# Patient Record
Sex: FEMALE | Race: WHITE | NOT HISPANIC OR LATINO | Employment: OTHER | URBAN - NONMETROPOLITAN AREA
[De-identification: names, ages, dates, MRNs, and addresses within clinical notes are randomized per-mention and may not be internally consistent; named-entity substitution may affect disease eponyms.]

---

## 2017-02-01 ENCOUNTER — COMMUNICATION - GICH (OUTPATIENT)
Dept: FAMILY MEDICINE | Facility: OTHER | Age: 82
End: 2017-02-01

## 2017-02-01 DIAGNOSIS — F41.8 OTHER SPECIFIED ANXIETY DISORDERS: ICD-10-CM

## 2017-04-20 ENCOUNTER — COMMUNICATION - GICH (OUTPATIENT)
Dept: FAMILY MEDICINE | Facility: OTHER | Age: 82
End: 2017-04-20

## 2017-04-20 DIAGNOSIS — F41.8 OTHER SPECIFIED ANXIETY DISORDERS: ICD-10-CM

## 2017-06-28 ENCOUNTER — COMMUNICATION - GICH (OUTPATIENT)
Dept: FAMILY MEDICINE | Facility: OTHER | Age: 82
End: 2017-06-28

## 2017-06-28 DIAGNOSIS — N39.46 MIXED INCONTINENCE: ICD-10-CM

## 2017-07-12 ENCOUNTER — COMMUNICATION - GICH (OUTPATIENT)
Dept: FAMILY MEDICINE | Facility: OTHER | Age: 82
End: 2017-07-12

## 2017-07-12 DIAGNOSIS — F41.8 OTHER SPECIFIED ANXIETY DISORDERS: ICD-10-CM

## 2017-07-12 DIAGNOSIS — I10 ESSENTIAL (PRIMARY) HYPERTENSION: ICD-10-CM

## 2017-07-14 ENCOUNTER — COMMUNICATION - GICH (OUTPATIENT)
Dept: FAMILY MEDICINE | Facility: OTHER | Age: 82
End: 2017-07-14

## 2017-07-14 DIAGNOSIS — F41.8 OTHER SPECIFIED ANXIETY DISORDERS: ICD-10-CM

## 2017-08-17 ENCOUNTER — COMMUNICATION - GICH (OUTPATIENT)
Dept: FAMILY MEDICINE | Facility: OTHER | Age: 82
End: 2017-08-17

## 2017-09-21 ENCOUNTER — COMMUNICATION - GICH (OUTPATIENT)
Dept: FAMILY MEDICINE | Facility: OTHER | Age: 82
End: 2017-09-21

## 2017-09-21 DIAGNOSIS — N39.46 MIXED INCONTINENCE: ICD-10-CM

## 2017-09-22 ENCOUNTER — COMMUNICATION - GICH (OUTPATIENT)
Dept: FAMILY MEDICINE | Facility: OTHER | Age: 82
End: 2017-09-22

## 2017-09-22 DIAGNOSIS — N39.46 MIXED INCONTINENCE: ICD-10-CM

## 2017-10-14 ENCOUNTER — COMMUNICATION - GICH (OUTPATIENT)
Dept: FAMILY MEDICINE | Facility: OTHER | Age: 82
End: 2017-10-14

## 2017-10-14 DIAGNOSIS — I10 ESSENTIAL (PRIMARY) HYPERTENSION: ICD-10-CM

## 2017-11-03 ENCOUNTER — HISTORY (OUTPATIENT)
Dept: FAMILY MEDICINE | Facility: OTHER | Age: 82
End: 2017-11-03

## 2017-11-03 ENCOUNTER — OFFICE VISIT - GICH (OUTPATIENT)
Dept: FAMILY MEDICINE | Facility: OTHER | Age: 82
End: 2017-11-03

## 2017-11-03 DIAGNOSIS — F41.8 OTHER SPECIFIED ANXIETY DISORDERS: ICD-10-CM

## 2017-11-03 DIAGNOSIS — K21.9 GASTRO-ESOPHAGEAL REFLUX DISEASE WITHOUT ESOPHAGITIS: ICD-10-CM

## 2017-11-03 DIAGNOSIS — J30.1 ALLERGIC RHINITIS DUE TO POLLEN: ICD-10-CM

## 2017-11-03 DIAGNOSIS — Z91.81 HISTORY OF FALLING: ICD-10-CM

## 2017-11-03 DIAGNOSIS — N39.46 MIXED INCONTINENCE: ICD-10-CM

## 2017-11-03 DIAGNOSIS — I10 ESSENTIAL (PRIMARY) HYPERTENSION: ICD-10-CM

## 2017-11-03 DIAGNOSIS — Z23 ENCOUNTER FOR IMMUNIZATION: ICD-10-CM

## 2017-11-03 LAB
ANION GAP - HISTORICAL: 6 (ref 5–18)
BUN SERPL-MCNC: 19 MG/DL (ref 7–25)
BUN/CREAT RATIO - HISTORICAL: 20
CALCIUM SERPL-MCNC: 9.2 MG/DL (ref 8.6–10.3)
CHLORIDE SERPLBLD-SCNC: 106 MMOL/L (ref 98–107)
CO2 SERPL-SCNC: 24 MMOL/L (ref 21–31)
CREAT SERPL-MCNC: 0.94 MG/DL (ref 0.7–1.3)
GFR IF NOT AFRICAN AMERICAN - HISTORICAL: 56 ML/MIN/1.73M2
GLUCOSE SERPL-MCNC: 101 MG/DL (ref 70–105)
POTASSIUM SERPL-SCNC: 3.8 MMOL/L (ref 3.5–5.1)
SODIUM SERPL-SCNC: 136 MMOL/L (ref 133–143)

## 2017-11-29 ENCOUNTER — COMMUNICATION - GICH (OUTPATIENT)
Dept: FAMILY MEDICINE | Facility: OTHER | Age: 82
End: 2017-11-29

## 2017-12-28 NOTE — TELEPHONE ENCOUNTER
Patient Information     Patient Name MRN Virginia Hidalgo [id number] Female 10/24/1929      Telephone Encounter by Sena Thompson RN at 10/17/2017  8:26 AM     Author:  Sena Thompson RN Service:  (none) Author Type:  NURS- Registered Nurse     Filed:  10/17/2017  8:31 AM Encounter Date:  10/14/2017 Status:  Signed     :  Sena Thompson RN (NURS- Registered Nurse)            Beta Blockers     Office visit in the past 12 months or per provider note.    Last visit with ERYN ACOSTA was on: 2016 in [facility]  Next visit with ERYN ACOSTA is on: 2017 in [facility]  Next visit with Family Practice is on: 2017 in [facility]    Max refill for 12 months from last office visit or per provider note.    Angiotensin Receptor Blockers (ARB)    Office visit in the past 12 months or per provider note.    Last visit with ERYN ACOSTA was on: 2016 in [facility]  Next visit with ERYN ACOSTA is on: 2017 in [facility]  Next visit with Family Practice is on: 2017 in [facility]    Lab test requirements:  Creatinine and Potassium annually, if ordering lab, order BMP.  CREATININE (mg/dL)    Date Value   2016 0.95     POTASSIUM (mmol/L)    Date Value   2016 4.8       Max refill for 12 months from last office visit or per provider note    Patient is due for medication management appointment. Limited refill provided at this time. Noted upcoming appointment. Will fill for #90, as that is what patient has requested in the past and she does have px appointment scheduled.  Prescription refilled per RN Medication Refill Policy.................... Sena Thompson RN ....................  10/17/2017   8:28 AM

## 2017-12-28 NOTE — PATIENT INSTRUCTIONS
Patient Information     Patient Name MRN Virginia Hidalgo [id number] Female 10/24/1929      Patient Instructions by Sanjeev Jasso MD at 11/3/2017 10:15 AM     Author:  Sanjeev Jasso MD  Service:  (none) Author Type:  Physician     Filed:  11/3/2017 11:11 AM  Encounter Date:  11/3/2017 Status:  Addendum     :  Sanjeev Jasso MD (Physician)        Related Notes: Original Note by Sanjeev Jasso MD (Physician) filed at 11/3/2017 11:11 AM            Stop the ditropan.  Use a humidifier in your bedroom near the bed/nightstand.  Restart the nasonex.  Double check to make sure your on some vitamin D (2000IU is best).  In the spring lets try tapering you off Effexor.  Take 1/2 tablet daily for a week then off.  If you feel depressed or anxious off the medicine you can restart it.

## 2017-12-28 NOTE — TELEPHONE ENCOUNTER
Patient Information     Patient Name MRN Virginia Hidalgo [id number] Female 10/24/1929      Telephone Encounter by Solomon Morgan RN at 2017 10:01 AM     Author:  Solomon Morgan RN Service:  (none) Author Type:  NURS- Registered Nurse     Filed:  2017 10:15 AM Encounter Date:  2017 Status:  Signed     :  Solomon Morgan RN (NURS- Registered Nurse)            Writer received rx request from [facility] for patient's metoprolol. Per rx request, patient is requesting a 90 day supply. Current rx for metoprolol in chart as noted below, with 30 day dispense amounts:    Prescribing Provider: Sanjeev Jasso MD             Order Date: 2017  Ordered by: KISHAN MASSEY  Medication:metoprolol tartrate (LOPRESSOR) 50 mg tablet  Prescription #:[id number]    Qty:30 tablet   Ref:2  Start:2017   End:              Route:Oral                  IOANA:No   Class:eRx    Sig:TAKE 1/2 TABLET BY MOUTH TWICE DAILY    Pharmacy:[facility] [address] [place] [address]   AT [address]              OF [facility] [address] [facility] - [phone]    Cosign accepted by SANJEEV JASSO[[id number]] on 2017  7:56 AM    Chart review shows that patient is overdue for an annual exam with PCP. 3 month supply of metoprolol given on 17 as noted above as per protocol. 90 day supply inappropriate at this time. No office visit noted with PCP since 17, and no office visit scheduled in the future. Call placed to patient to discuss. Was unable to reach patient at this time, but was able to speak to Aliza, who is listed on patient's list of contacts. Advised her of above. Aliza states understanding. Will schedule an office visit for patient closer to 2017. Nothing further needed at this time. Writer will refuse rx request at this time.    Unable to complete prescription refill per RN Medication Refill Policy.................... Solomon Morgan RN ....................  2017   10:10 AM

## 2017-12-28 NOTE — TELEPHONE ENCOUNTER
Patient Information     Patient Name MRN Sex Virginia Blackwood [id number] Female 10/24/1929      Telephone Encounter by Lyudmila Bowles RN at 2017  3:20 PM     Author:  Lyudmila Bowles RN Service:  (none) Author Type:  NURS- Registered Nurse     Filed:  2017  3:25 PM Encounter Date:  2017 Status:  Signed     :  Lyudmila Bowles RN (NURS- Registered Nurse)            Depression-in adults 18 and over  Serotonin/Norepinephrine Reuptake Inhibitors  Office visit in the past 12 months or as indicated in chart.  Should have clinic visit 1-2 months after initial prescription.  Last visit with ERYN ACOSTA was on: 2016 in [facility]  Next visit with ERYN ACOSTA is on: No future appointment listed with this provider  Next visit with Family Practice is on: No future appointment listed in this department  Max refills 12 months from last office visit or per providers notes.  Due for exam.  Limited refill per protocol and letter mailed.  Lyudmila Bowles RN ........   2017    3:21 PM    Angiotensin Receptor Blockers (ARB)  Office visit in the past 12 months or per provider note.  Last visit with ERYN ACOSTA was on: 2016 in [facility]  Next visit with ERYN ACOSTA is on: No future appointment listed with this provider  Next visit with Family Practice is on: No future appointment listed in this department  Lab test requirements:  Creatinine and Potassium annually, if ordering lab, order BMP.  CREATININE (mg/dL)    Date Value   2016 0.95     POTASSIUM (mmol/L)    Date Value   2016 4.8   Max refill for 12 months from last office visit or per provider note  Due for exam.  Limited refill per protocol and letter mailed.  Lyudmila Bowles RN ........   2017    3:21 PM    Beta Blockers   Office visit in the past 12 months or per provider note.  Last visit with ERYN ACOSTA was on: 2016 in [facility]  Next visit with ERYN ACOSTA is on: No future appointment listed with this provider  Next visit with Family Practice is on: No future appointment listed in this department  Max refill for 12 months from last office visit or per provider note.  Due for exam.  Limited refill per protocol and letter mailed.  Lyudmila Bowles RN ........   7/14/2017    3:21 PM

## 2017-12-28 NOTE — TELEPHONE ENCOUNTER
Patient Information     Patient Name MRN Virginia Hidalgo [id number] Female 10/24/1929      Telephone Encounter by Raven Kat at 2017  2:48 PM     Author:  Raven Kat Service:  (none) Author Type:  (none)     Filed:  2017  2:57 PM Encounter Date:  2017 Status:  Signed     :  Raven Kat            Fax from [facility] regarding Nasonex not covered by her insurance. Flonase is covered. Change to Flonase per Dr Jasso. Mick notified.

## 2017-12-28 NOTE — TELEPHONE ENCOUNTER
Patient Information     Patient Name MRN Virginia Hidalgo [id number] Female 10/24/1929      Telephone Encounter by Sena Thompson RN at 2017 11:15 AM     Author:  Sena Thompson RN Service:  (none) Author Type:  NURS- Registered Nurse     Filed:  2017 11:18 AM Encounter Date:  2017 Status:  Signed     :  Sena Thompson RN (NURS- Registered Nurse)            Depression-in adults 18 and over  Serotonin/Norepinephrine Reuptake Inhibitors    Office visit in the past 12 months or as indicated in chart.  Should have clinic visit 1-2 months after initial prescription.    Last visit with ERYN ACOSTA was on: 2016 in [facility]  Next visit with ERYN ACOSTA is on: No future appointment listed with this provider  Next visit with Family Practice is on: No future appointment listed in this department    Max refills 12 months from last office visit or per providers notes.    Patient is due for medication management appointment. Limited refill provided at this time. Drippler message and/or letter recently sent for reminder to patient. Prescription refilled per RN Medication Refill Policy.................... Sena Thompson RN ....................  2017   11:17 AM

## 2017-12-28 NOTE — PROGRESS NOTES
"Patient Information     Patient Name MRN Virginia Hidalgo [id number] Female 10/24/1929      Progress Notes by Sanjeev Jasso MD at 11/3/2017 10:15 AM     Author:  Sanjeev Jasso MD Service:  (none) Author Type:  Physician     Filed:  2017  8:30 PM Encounter Date:  11/3/2017 Status:  Signed     :  Sanjeev Jasso MD (Physician)            Nursing Notes:   Raven Kat  11/3/2017 10:41 AM  Signed  Patient comes in to the clinic today for an annual physical and med refills.      SUBJECTIVE:  Virginia Leone is a 88 y.o. Female.  She comes in today for evaluation of several chronic issues and a few subacute issues.  She is here to follow up on her BP.  She is taking losartan and metoprolol.  She has not noticed any cardiopulmonary symptoms.  She occasionally falls but reports this is rare, perhaps once every 2-3 months and has been mechanical in nature.  Has not had any injuries.      She reports constant dry mouth and some dry nose.  She has had clear nasal drainage and has not been using her nasonex.    She does remain on pepcid and has had some breakthrough acid reflux.  No dysphagia.  No black or bloody stools.    She feels her mood is excellent and doesn't think she needs to be on effexor.  After further discussion with family however, daughter reports she often gets \"down\" in the winter and is concerned about a trial of the medication during the current season.    PHQ Depression Screening 2016 11/3/2017   Date of PHQ exam (doc flow) 2016 11/3/2017   1. Lack of interest/pleasure 0 - Not at all 0 - Not at all   2. Feeling down/depressed 0 - Not at all 0 - Not at all   PHQ-2 TOTAL SCORE 0 0   3. Trouble sleeping 2 - More than half the days -   4. Decreased energy 2 - More than half the days -   5. Appetite change 0 - Not at all -   6. Feelings of failure 0 - Not at all -   7. Trouble concentrating 2 - More than half the days -   8. Activity level 0 - Not at all " "-   9. Hurting yourself 0 - Not at all -   PHQ-9 TOTAL SCORE 6 -   PHQ-9 Severity Level mild -   Functional Impairment not difficult at all -   Some recent data might be hidden             Social History     Substance Use Topics       Smoking status: Never Smoker     Smokeless tobacco: Never Used     Alcohol use No       I have personally reviewed and updated above noted social, family and/or past medical history.    A comprehensive review of systems was negative except for items noted in HPI/Subjective.      OBJECTIVE:  /80  Pulse 76  Ht 1.6 m (5' 3\")  Wt 54.9 kg (121 lb)  BMI 21.43 kg/m2  EXAM:  General Appearance: Pleasant, alert, appropriate appearance for age. No acute distress  Eye Exam: Normal external eye, conjunctiva, lids, cornea. PETER.  Funduscopic Exam: no retinal or vascular abnormality  Ear Exam: Normal TM's bilaterally. Normal auditory canals and external ears. Non-tender.  Nose Exam: Pale and boggy turbinates.    OroPharynx Exam: Normal.  Neck Exam: Supple, no masses or nodes.  Thyroid Exam: No nodules or enlargement.  Chest/Respiratory Exam: Normal chest wall and respirations. Clear to auscultation.  Cardiovascular Exam: Regular rate and rhythm. S1, S2, no murmur, click, gallop, or rubs.  Gastrointestinal Exam: Soft, nontender, no abnormal masses or organomegaly.  Musculoskeletal Exam: No synovitis.  Muscle strength age and body habitus appropriate as well as equal and even.   Skin: no rash or abnormalities  Neurologic Exam: Nonfocal; symmetric DTRs, normal gross motor movement, tone, and coordination. No tremor.  Psychiatric Exam: Alert and oriented, appropriate affect.    ASSESSMENT/Plan :    I personally reviewed the patients' labs     Results for orders placed or performed in visit on 11/03/17      BASIC METABOLIC PANEL      Result  Value Ref Range    SODIUM 136 133 - 143 mmol/L    POTASSIUM 3.8 3.5 - 5.1 mmol/L    CHLORIDE 106 98 - 107 mmol/L    CO2,TOTAL 24 21 - 31 mmol/L    ANION " GAP 6 5 - 18                    GLUCOSE 101 70 - 105 mg/dL    CALCIUM 9.2 8.6 - 10.3 mg/dL    BUN 19 7 - 25 mg/dL    CREATININE 0.94 0.70 - 1.30 mg/dL    BUN/CREAT RATIO           20                    GFR if African American >60 >60 ml/min/1.73m2    GFR if not  56 (L) >60 ml/min/1.73m2       Virginia was seen today for physical.    Diagnoses and all orders for this visit:    Chronic seasonal allergic rhinitis due to pollen  I think her ditropan may be causing some of her symptoms but with dry air and fall weather has had more allergic symptoms as well.  Will use humidifier and nasonex.  Return if not resolving.  -     mometasone (NASONEX) (50 mcg each actuation) nasal spray; Inhale 1 Spray into both nostrils 2 times daily.    HYPERTENSION  BP is above goal but did come down nicely on recheck.  Suggest low salt diet, daily exercise and if remains elevated suggest increase in metoprolol to 50mg bid.  -     losartan (COZAAR) 50 mg tablet; Take 1 tablet by mouth once daily.  -     metoprolol tartrate (LOPRESSOR) 25 mg tablet; Take 1 tablet by mouth 2 times daily.  -     BASIC METABOLIC PANEL; Future  -     BASIC METABOLIC PANEL    Mixed stress and urge urinary incontinence   Will discontinue ditropan due to side effects.  If she has urinary symptoms that impair her activities of daily living will consider an alternative medication.  If side effects do no resolve off the medication we could restart.  Suggest kegel exercises for the stress component.    Depression with anxiety  Will continue on current dose with plan to discontinue in spring if mood remains excellent.  -     venlafaxine (EFFEXOR) 50 mg tablet; Take 1 tablet by mouth every morning.    Gastroesophageal reflux disease, esophagitis presence not specified  -     omeprazole (PRILOSEC) 20 mg Delayed-Release capsule; Take 1 capsule by mouth once daily before a meal.    Falls infrequently  -     AMB CONSULT TO PHYSICAL THERAPY; Future    Need for  prophylactic vaccination and inoculation against influenza  -     FLU VACCINE => 65 YRS HIGH DOSE TRIVALENT IIV3 IM  -     DE ADMIN VACC INITIAL    Need for prophylactic vaccination against Streptococcus pneumoniae (pneumococcus)  -     PREVNAR 13 (AKA PNEUMOCOCCAL VACCINE 13-VALENT IM)  -     DE ADMIN EA ADDL VACC          Patient Instructions   Stop the ditropan.  Use a humidifier in your bedroom near the bed/nightstand.  Restart the nasonex.  Double check to make sure your on some vitamin D (2000IU is best).  In the spring lets try tapering you off Effexor.  Take 1/2 tablet daily for a week then off.  If you feel depressed or anxious off the medicine you can restart it.      Sanjeev Jasso MD    This document was created using computer generated templates and voice activated software.

## 2017-12-28 NOTE — TELEPHONE ENCOUNTER
Patient Information     Patient Name MRN Sex Virginia Blackwood [id number] Female 10/24/1929      Telephone Encounter by Solomon Morgan RN at 2017  1:13 PM     Author:  Solomon Morgan RN Service:  (none) Author Type:  NURS- Registered Nurse     Filed:  2017  1:18 PM Encounter Date:  2017 Status:  Signed     :  Solomon Morgan RN (NURS- Registered Nurse)            Oxybutynin    Office visit in the past 12 months or per provider note.    Last visit with ERYN ACOSTA was on: 2016 in [facility]  Next visit with ERYN ACOSTA is on: No future appointment listed with this provider  Next visit with Family Practice is on: No future appointment listed in this department    Max refill for 12 months from last office visit or per provider note.    Chart review shows that patient was most recently seen by PCP on 16 for a medication management appointment. Oxybutynin was started at that office visit as per review of office visit notes. Patient is due for annual office visit with PCP. Writer will refill rx as requested for a limited supply and send patient a reminder letter.    Prescription refilled per RN Medication Refill Policy.................... Solomon Morgan RN ....................  2017   1:15 PM

## 2017-12-28 NOTE — TELEPHONE ENCOUNTER
Patient Information     Patient Name MRN Virginia Hidalgo [id number] Female 10/24/1929      Telephone Encounter by Solomon Morgan RN at 2017  8:19 AM     Author:  Solomon Morgan RN Service:  (none) Author Type:  NURS- Registered Nurse     Filed:  2017  8:24 AM Encounter Date:  2017 Status:  Signed     :  Solomon Morgan RN (NURS- Registered Nurse)            Oxybutynin    Office visit in the past 12 months or per provider note.    Last visit with ERYN ACOSTA was on: 2016 in [facility]  Next visit with ERYN ACOSTA is on: 2017 in [facility]  Next visit with Family Practice is on: 2017 in [facility]    Max refill for 12 months from last office visit or per provider note.    Rx request shows that patient would like a 90 day supply of rx as requested. Chart review shows that writer had filled rx for a 30 day supply on 17. Patient was overdue for an appointment with PCP, and no appointment had been scheduled at that time. Appointment is now noted to be scheduled for 11/3/17. Writer will refill rx as requested for a 90 day supply at this time.    Prescription refilled per RN Medication Refill Policy.................... Solomon Morgan RN ....................  2017   8:23 AM

## 2017-12-28 NOTE — TELEPHONE ENCOUNTER
Patient Information     Patient Name MRN Virginia Hidalgo [id number] Female 10/24/1929      Telephone Encounter by Solomon Morgan RN at 2017  3:17 PM     Author:  Solomon Morgan RN Service:  (none) Author Type:  NURS- Registered Nurse     Filed:  2017  3:24 PM Encounter Date:  2017 Status:  Signed     :  Solomon Morgan RN (NURS- Registered Nurse)            Oxybutynin    Office visit in the past 12 months or per provider note.    Last visit with ERYN ACOSTA was on: 2016 in [facility]  Next visit with ERYN ACOSTA is on: No future appointment listed with this provider  Next visit with Family Practice is on: No future appointment listed in this department    Max refill for 12 months from last office visit or per provider note.    Chart review shows that with last rx refill of an rx for patient on 17, writer had given a verbal reminder to Aliza Burns, patient's EC that patient was due for an annual office visit with PCP. Patient's EC stated at that time, that she was going to schedule an office visit for patient in 2017. No office visit noted to be scheduled at this time. Call placed to patient's EC, Aliza. Was unable to reach Aliza at this time, but writer did leave a message for patient's EC to return this writer's call. Writer will refill rx as requested for a 30 day supply at this time. This will mirror other rxs as noted to be due for refill in 2017.    Prescription refilled per RN Medication Refill Policy.................... Solomon Morgan RN ....................  2017   3:23 PM

## 2017-12-30 NOTE — NURSING NOTE
Patient Information     Patient Name MRN Sex Virginia Blackwood [id number] Female 10/24/1929      Nursing Note by Raven Kat at 11/3/2017 10:15 AM     Author:  Raven Kat Service:  (none) Author Type:  (none)     Filed:  11/3/2017 10:41 AM Encounter Date:  11/3/2017 Status:  Signed     :  Raven Kat            Patient comes in to the clinic today for an annual physical and med refills.

## 2018-01-03 NOTE — TELEPHONE ENCOUNTER
Patient Information     Patient Name MRN Virginia Hidalgo [id number] Female 10/24/1929      Telephone Encounter by Lyudmila Bowles RN at 2017 12:18 PM     Author:  Lyudmila Bowles RN Service:  (none) Author Type:  NURS- Registered Nurse     Filed:  2017 12:29 PM Encounter Date:  2017 Status:  Signed     :  Lyudmila Bowles RN (NURS- Registered Nurse)            Depression-in adults 18 and over  Serotonin/Norepinephrine Reuptake Inhibitors  Office visit in the past 12 months or as indicated in chart.  Should have clinic visit 1-2 months after initial prescription.  Last visit with Sanjeev Jasso MD was on: 2016  Max refills 12 months from last office visit or per providers notes.  Prescription refilled per RN Medication Refill Policy.................... Lyudmila Bowles RN ....................  2017   12:28 PM      PHQ Depression Screening 2015   Date of PHQ exam (doc flow) 2015   1. Lack of interest/pleasure 1 - Several days 0 - Not at all   2. Feeling down/depressed 1 - Several days 0 - Not at all   PHQ-2 TOTAL SCORE 2 0   3. Trouble sleeping 3 - Nearly every day 2 - More than half the days   4. Decreased energy 3 - Nearly every day 2 - More than half the days   5. Appetite change 0 - Not at all 0 - Not at all   6. Feelings of failure 0 - Not at all 0 - Not at all   7. Trouble concentrating 0 - Not at all 2 - More than half the days   8. Activity level 0 - Not at all 0 - Not at all   9. Hurting yourself 1 - Several days 0 - Not at all   PHQ-9 TOTAL SCORE 9 6   PHQ-9 Severity Level mild mild   Functional Impairment somewhat difficult not difficult at all

## 2018-01-04 NOTE — TELEPHONE ENCOUNTER
Patient Information     Patient Name MRN Virginia Hidalgo [id number] Female 10/24/1929      Telephone Encounter by Sena Thompson RN at 2017  2:56 PM     Author:  Sena Thompson RN Service:  (none) Author Type:  NURS- Registered Nurse     Filed:  2017  3:02 PM Encounter Date:  2017 Status:  Signed     :  Sena Thompson RN (NURS- Registered Nurse)            Depression-in adults 18 and over  Serotonin/Norepinephrine Reuptake Inhibitors    Office visit in the past 12 months or as indicated in chart.  Should have clinic visit 1-2 months after initial prescription.    Last visit with ERYN ACOSTA was on: 2016 in [facility]  Next visit with ERYN ACOSTA is on: No future appointment listed with this provider  Next visit with Family Practice is on: No future appointment listed in this department    Max refills 12 months from last office visit or per providers notes.    PHQ Depression Screening 2015   Date of PHQ exam (doc flow) 2015   1. Lack of interest/pleasure 1 - Several days 0 - Not at all   2. Feeling down/depressed 1 - Several days 0 - Not at all   PHQ-2 TOTAL SCORE 2 0   3. Trouble sleeping 3 - Nearly every day 2 - More than half the days   4. Decreased energy 3 - Nearly every day 2 - More than half the days   5. Appetite change 0 - Not at all 0 - Not at all   6. Feelings of failure 0 - Not at all 0 - Not at all   7. Trouble concentrating 0 - Not at all 2 - More than half the days   8. Activity level 0 - Not at all 0 - Not at all   9. Hurting yourself 1 - Several days 0 - Not at all   PHQ-9 TOTAL SCORE 9 6   PHQ-9 Severity Level mild mild   Functional Impairment somewhat difficult not difficult at all       Due for follow up in July.    Prescription refilled per RN Medication Refill Policy.................... Sena Thompson RN ....................  2017   3:01 PM

## 2018-01-27 VITALS
BODY MASS INDEX: 21.44 KG/M2 | WEIGHT: 121 LBS | DIASTOLIC BLOOD PRESSURE: 80 MMHG | HEIGHT: 63 IN | HEART RATE: 76 BPM | SYSTOLIC BLOOD PRESSURE: 142 MMHG

## 2018-01-29 ENCOUNTER — DOCUMENTATION ONLY (OUTPATIENT)
Dept: FAMILY MEDICINE | Facility: OTHER | Age: 83
End: 2018-01-29

## 2018-01-29 PROBLEM — M19.90 OSTEOARTHROSIS: Status: ACTIVE | Noted: 2018-01-29

## 2018-01-29 PROBLEM — I10 HYPERTENSION: Status: ACTIVE | Noted: 2018-01-29

## 2018-01-29 PROBLEM — N39.41 URINARY INCONTINENCE, URGE: Status: ACTIVE | Noted: 2018-01-29

## 2018-01-29 PROBLEM — R25.2 CRAMP OF LIMB: Status: ACTIVE | Noted: 2018-01-29

## 2018-01-29 PROBLEM — K42.9 UMBILICAL HERNIA: Status: ACTIVE | Noted: 2018-01-29

## 2018-01-29 PROBLEM — K21.9 ESOPHAGEAL REFLUX: Status: ACTIVE | Noted: 2018-01-29

## 2018-01-29 PROBLEM — E78.00 HYPERCHOLESTEROLEMIA: Status: ACTIVE | Noted: 2018-01-29

## 2018-01-29 PROBLEM — L20.9 DERMATITIS, ATOPIC: Status: ACTIVE | Noted: 2018-01-29

## 2018-01-29 PROBLEM — L57.0 ACTINIC KERATOSIS: Status: ACTIVE | Noted: 2018-01-29

## 2018-01-29 RX ORDER — LOSARTAN POTASSIUM 50 MG/1
50 TABLET ORAL DAILY
COMMUNITY
Start: 2017-11-03 | End: 2019-01-18

## 2018-01-29 RX ORDER — FLUTICASONE PROPIONATE 50 MCG
1 SPRAY, SUSPENSION (ML) NASAL 2 TIMES DAILY
COMMUNITY
Start: 2017-11-29 | End: 2019-02-27

## 2018-01-29 RX ORDER — METOPROLOL TARTRATE 25 MG/1
25 TABLET, FILM COATED ORAL 2 TIMES DAILY
COMMUNITY
Start: 2017-11-03 | End: 2019-01-18

## 2018-01-29 RX ORDER — VENLAFAXINE 50 MG/1
50 TABLET ORAL EVERY MORNING
COMMUNITY
Start: 2017-11-03 | End: 2019-01-18

## 2018-06-15 ENCOUNTER — APPOINTMENT (OUTPATIENT)
Dept: CT IMAGING | Facility: OTHER | Age: 83
End: 2018-06-15
Attending: FAMILY MEDICINE
Payer: MEDICARE

## 2018-06-15 ENCOUNTER — HOSPITAL ENCOUNTER (EMERGENCY)
Facility: OTHER | Age: 83
Discharge: HOME | End: 2018-06-15
Attending: FAMILY MEDICINE | Admitting: FAMILY MEDICINE
Payer: MEDICARE

## 2018-06-15 VITALS
OXYGEN SATURATION: 98 % | BODY MASS INDEX: 21.26 KG/M2 | SYSTOLIC BLOOD PRESSURE: 163 MMHG | WEIGHT: 120 LBS | RESPIRATION RATE: 16 BRPM | HEIGHT: 63 IN | DIASTOLIC BLOOD PRESSURE: 73 MMHG | TEMPERATURE: 98.9 F

## 2018-06-15 DIAGNOSIS — S16.1XXA STRAIN OF NECK MUSCLE, INITIAL ENCOUNTER: ICD-10-CM
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
## 2018-06-15 DIAGNOSIS — S60.221A CONTUSION OF RIGHT HAND, INITIAL ENCOUNTER: ICD-10-CM

## 2018-06-15 DIAGNOSIS — R29.6 FALLS FREQUENTLY: ICD-10-CM

## 2018-06-15 DIAGNOSIS — S80.00XA CONTUSION OF KNEE, UNSPECIFIED LATERALITY, INITIAL ENCOUNTER: ICD-10-CM

## 2018-06-15 LAB
ALBUMIN UR-MCNC: NEGATIVE MG/DL
ANION GAP SERPL CALCULATED.3IONS-SCNC: 7 MMOL/L (ref 3–14)
APPEARANCE UR: CLEAR
BASOPHILS # BLD AUTO: 0 10E9/L (ref 0–0.2)
BASOPHILS NFR BLD AUTO: 0.6 %
BILIRUB UR QL STRIP: NEGATIVE
BUN SERPL-MCNC: 24 MG/DL (ref 7–25)
CALCIUM SERPL-MCNC: 8.9 MG/DL (ref 8.6–10.3)
CHLORIDE SERPL-SCNC: 102 MMOL/L (ref 98–107)
CO2 SERPL-SCNC: 27 MMOL/L (ref 21–31)
COLOR UR AUTO: YELLOW
CREAT SERPL-MCNC: 1.09 MG/DL (ref 0.6–1.2)
DIFFERENTIAL METHOD BLD: ABNORMAL
EOSINOPHIL # BLD AUTO: 0 10E9/L (ref 0–0.7)
EOSINOPHIL NFR BLD AUTO: 0 %
ERYTHROCYTE [DISTWIDTH] IN BLOOD BY AUTOMATED COUNT: 13.7 % (ref 10–15)
GFR SERPL CREATININE-BSD FRML MDRD: 47 ML/MIN/1.7M2
GLUCOSE SERPL-MCNC: 111 MG/DL (ref 70–105)
GLUCOSE UR STRIP-MCNC: NEGATIVE MG/DL
HCT VFR BLD AUTO: 32.9 % (ref 35–47)
HGB BLD-MCNC: 10.9 G/DL (ref 11.7–15.7)
HGB UR QL STRIP: NEGATIVE
IMM GRANULOCYTES # BLD: 0 10E9/L (ref 0–0.4)
IMM GRANULOCYTES NFR BLD: 0.6 %
KETONES UR STRIP-MCNC: NEGATIVE MG/DL
LEUKOCYTE ESTERASE UR QL STRIP: NEGATIVE
LYMPHOCYTES # BLD AUTO: 1.2 10E9/L (ref 0.8–5.3)
LYMPHOCYTES NFR BLD AUTO: 19.8 %
MCH RBC QN AUTO: 33 PG (ref 26.5–33)
MCHC RBC AUTO-ENTMCNC: 33.1 G/DL (ref 31.5–36.5)
MCV RBC AUTO: 100 FL (ref 78–100)
MONOCYTES # BLD AUTO: 0.7 10E9/L (ref 0–1.3)
MONOCYTES NFR BLD AUTO: 11.3 %
NEUTROPHILS # BLD AUTO: 4.2 10E9/L (ref 1.6–8.3)
NEUTROPHILS NFR BLD AUTO: 67.7 %
NITRATE UR QL: NEGATIVE
PH UR STRIP: 6 PH (ref 5–7)
PLATELET # BLD AUTO: 285 10E9/L (ref 150–450)
POTASSIUM SERPL-SCNC: 4.2 MMOL/L (ref 3.5–5.1)
RBC # BLD AUTO: 3.3 10E12/L (ref 3.8–5.2)
SODIUM SERPL-SCNC: 136 MMOL/L (ref 134–144)
SOURCE: NORMAL
SP GR UR STRIP: 1.01 (ref 1–1.03)
UROBILINOGEN UR STRIP-ACNC: 0.2 EU/DL (ref 0.2–1)
WBC # BLD AUTO: 6.3 10E9/L (ref 4–11)

## 2018-06-15 PROCEDURE — 81003 URINALYSIS AUTO W/O SCOPE: CPT | Performed by: FAMILY MEDICINE

## 2018-06-15 PROCEDURE — 99284 EMERGENCY DEPT VISIT MOD MDM: CPT | Mod: Z6 | Performed by: FAMILY MEDICINE

## 2018-06-15 PROCEDURE — 93005 ELECTROCARDIOGRAM TRACING: CPT | Performed by: FAMILY MEDICINE

## 2018-06-15 PROCEDURE — 72125 CT NECK SPINE W/O DYE: CPT

## 2018-06-15 PROCEDURE — 36415 COLL VENOUS BLD VENIPUNCTURE: CPT | Performed by: FAMILY MEDICINE

## 2018-06-15 PROCEDURE — 70450 CT HEAD/BRAIN W/O DYE: CPT

## 2018-06-15 PROCEDURE — 85025 COMPLETE CBC W/AUTO DIFF WBC: CPT | Performed by: FAMILY MEDICINE

## 2018-06-15 PROCEDURE — 99285 EMERGENCY DEPT VISIT HI MDM: CPT | Mod: 25 | Performed by: FAMILY MEDICINE

## 2018-06-15 PROCEDURE — 80048 BASIC METABOLIC PNL TOTAL CA: CPT | Performed by: FAMILY MEDICINE

## 2018-06-15 PROCEDURE — 93010 ELECTROCARDIOGRAM REPORT: CPT | Performed by: INTERNAL MEDICINE

## 2018-06-15 ASSESSMENT — ENCOUNTER SYMPTOMS
WEAKNESS: 0
CARDIOVASCULAR NEGATIVE: 1
CONSTITUTIONAL NEGATIVE: 1
NECK STIFFNESS: 1
GASTROINTESTINAL NEGATIVE: 1
NUMBNESS: 0
NECK PAIN: 1
RESPIRATORY NEGATIVE: 1

## 2018-06-15 NOTE — ED AVS SNAPSHOT
[facility]    [address]    [place] [address]    Phone:  [phone]    Fax:  [phone]                                       Virginia Leone   MRN: [id number]    Department:  [facility]   Date of Visit:  6/15/2018           After Visit Summary Signature Page     I have received my discharge instructions, and my questions have been answered. I have discussed any challenges I see with this plan with the nurse or doctor.    ..........................................................................................................................................  Patient/Patient Representative Signature      ..........................................................................................................................................  Patient Representative Print Name and Relationship to Patient    ..................................................               ................................................  Date                                            Time    ..........................................................................................................................................  Reviewed by Signature/Title    ...................................................              ..............................................  Date                                                            Time

## 2018-06-15 NOTE — ED PROVIDER NOTES
"  History     Chief Complaint   Patient presents with     Fall     HPI  Virginia Leone is a 88 year old female who had witnessed fall PTA at home/private residence.  Witnessed by her daughter.  She tripped on a chair leg and fell forward onto floor striking head on another furniture leg.  C/o neck pain.  She states she \"lit on my knees and wrenched my neck.\"  No LOC.  Reports she falls a lot lately.  No weakness or numbness of extremities.    Problem List:    Patient Active Problem List    Diagnosis Date Noted     Actinic keratosis 2018     Priority: Medium     Overview:   Nose       Dermatitis, atopic 2018     Priority: Medium     Umbilical hernia 2018     Priority: Medium     Esophageal reflux 2018     Priority: Medium     Hypercholesterolemia 2018     Priority: Medium     Hypertension 2018     Priority: Medium     Cramp of limb 2018     Priority: Medium     Osteoarthrosis 2018     Priority: Medium     Overview:   Cervical and lumbar spine       Urinary incontinence, urge 2018     Priority: Medium     Hypertensive emergency 10/25/2013     Priority: Medium     Gastroenteritis 2012     Priority: Medium     Hypokalemia 2012     Priority: Medium     Arthropathy 2011     Priority: Medium     Female stress incontinence 2010     Priority: Medium     Osteoporosis 2010     Priority: Medium        Past Medical History:    Past Medical History:   Diagnosis Date     Actinic keratosis      Gastritis without bleeding      Mass of breast      Mass of right breast      Phlebitis of superficial vein of left lower extremity      Umbilical hernia without obstruction or gangrene        Past Surgical History:    Past Surgical History:   Procedure Laterality Date     BUNIONECTOMY      ,right 1st metatarsal head and hammer toe      SECTION      X 2     ENDOSCOPY UPPER, COLONOSCOPY, COMBINED      ,positive for H. pylori and " "diverticulosis     ESOPHAGOSCOPY, GASTROSCOPY, DUODENOSCOPY (EGD), COMBINED      2000, moderate gastritis       Family History:    Family History   Problem Relation Age of Onset     HEART DISEASE Mother      Heart Disease,CAD     HEART DISEASE Other      Heart Disease,CAD also other family members     Hypertension Daughter      Hypertension     Hypertension Daughter      Hypertension     Other - See Comments Son      developmentally delayed adult       Social History:  Marital Status:   [2]  Social History   Substance Use Topics     Smoking status: Never Smoker     Smokeless tobacco: Never Used     Alcohol use No        Medications:      aspirin 81 MG tablet   fluticasone (FLONASE) 50 MCG/ACT spray   losartan (COZAAR) 50 MG tablet   metoprolol tartrate (LOPRESSOR) 25 MG tablet   omeprazole (PRILOSEC) 20 MG CR capsule   venlafaxine (EFFEXOR) 50 MG tablet         Review of Systems   Constitutional: Negative.    HENT: Positive for hearing loss.    Eyes: Negative for visual disturbance.   Respiratory: Negative.    Cardiovascular: Negative.    Gastrointestinal: Negative.    Genitourinary: Negative.    Musculoskeletal: Positive for gait problem (Falls frequently.), neck pain and neck stiffness.        Bilateral knee pain   Skin: Negative.    Neurological: Negative for weakness and numbness.       Physical Exam   BP: 173/69  Heart Rate: 76  Temp: 98.9  F (37.2  C)  Resp: 16  Height: 160 cm (5' 3\")  Weight: 54.4 kg (120 lb)  SpO2: (!) 84 %      Physical Exam   Constitutional: She appears well-developed and well-nourished. She appears distressed.   Very hard of hearing elderly female arriving via EMS.  Moving all extremities well but complaining of neck pain.   HENT:   Head: Normocephalic and atraumatic.   Right Ear: External ear normal.   Left Ear: External ear normal.   Nose: Nose normal.   Mouth/Throat: Oropharynx is clear and moist. No oropharyngeal exudate.   No hemotympanum.   Eyes: Conjunctivae and EOM are " normal. Pupils are equal, round, and reactive to light. Right eye exhibits no discharge. Left eye exhibits no discharge. No scleral icterus.   Neck: No tracheal deviation present. No thyromegaly present.   Diffuse posterior vertebral tenderness and decreased range of motion in all directions.   Cardiovascular: Normal rate, regular rhythm and normal heart sounds.    No murmur heard.  Pulmonary/Chest: Effort normal and breath sounds normal. No respiratory distress.   Abdominal: Soft. Bowel sounds are normal. She exhibits no distension.   Musculoskeletal: Normal range of motion. She exhibits tenderness (Mild bilateral knee tenderness without effusion.). She exhibits no edema or deformity.   Pelvis is stable with normal internal and external rotation at hips bilaterally.  Knees show no abrasions and no significant bruising or joint effusion.  Normal knee flexion and extension and straight leg raising bilaterally.  Nontender exam.   Lymphadenopathy:     She has no cervical adenopathy.   Neurological: She is alert. No cranial nerve deficit. She exhibits normal muscle tone. Coordination normal.   Skin: Skin is warm and dry. No rash noted. She is not diaphoretic.   Psychiatric: She has a normal mood and affect.   Nursing note and vitals reviewed.      ED Course     ED Course     Procedures               EKG Interpretation:      Interpreted by Tres oSsa  Time reviewed: 14:45  Symptoms at time of EKG: s/p fall o/w well.   Rhythm: normal sinus   Rate: normal  Axis: normal  Ectopy: none  Conduction: normal  ST Segments/ T Waves: No ST-T wave changes  Q Waves: none  Comparison to prior: Unchanged from 10/25/13    Clinical Impression: normal EKG          Critical Care time:  none               Results for orders placed or performed during the hospital encounter of 06/15/18 (from the past 24 hour(s))   CT Head w/o Contrast    Narrative    PROCEDURE: CT HEAD W/O CONTRAST     HISTORY: Fall striking head on floor/chair leg.   Neck strain/pain.   Falls frequently.; .    COMPARISON: MR head 7/21/2016    TECHNIQUE:  Helical images of the head from the foramen magnum to the  vertex were obtained without contrast.    FINDINGS: The ventricles and sulci are prominent, compatible with  moderate, generalized volume loss. No acute intracranial hemorrhage,  mass effect, midline shift, hydrocephalus or basilar cystern  effacement are present.    The grey-white matter interface is preserved. Patchy and confluent  hypoattenuation within the supratentorial subcortical and  periventricular white matter reflects chronic microvascular ischemic  change.     The calvarium is intact. Advanced degenerative changes are seen at C1  on the right. There is intracranial atherosclerotic disease.      Impression    IMPRESSION: No acute intracranial hemorrhage. Unchanged CT appearance  of the brain.      TESSY MENDOZA MD   CT Cervical Spine w/o Contrast    Narrative    PROCEDURE: CT CERVICAL SPINE W/O CONTRAST     HISTORY: neck pain, Fall striking head on floor/chair leg.  Neck  strain/pain.  Falls frequently.; .    TECHNIQUE: Helical noncontrast CT images of the cervical spine.    COMPARISON: None.    FINDINGS:     No acute fracture is identified. The vertebral bodies are normal in  height.     Advanced degenerative changes include gradual reversal of the cervical  lordosis, stepwise anterolisthesis of C2-C5 and again at C7-T1. There  is advanced disc height loss at C5-6 and C6-7. Diffuse facet and  uncovertebral hypertrophy is present. There are advanced C1-2  degenerative changes on the right.      Impression    IMPRESSION: No evidence of acute cervical spine fracture.    TESSY MENDOZA MD   CBC with platelets differential   Result Value Ref Range    WBC 6.3 4.0 - 11.0 10e9/L    RBC Count 3.30 (L) 3.8 - 5.2 10e12/L    Hemoglobin 10.9 (L) 11.7 - 15.7 g/dL    Hematocrit 32.9 (L) 35.0 - 47.0 %     78 - 100 fl    MCH 33.0 26.5 - 33.0 pg    MCHC 33.1  31.5 - 36.5 g/dL    RDW 13.7 10.0 - 15.0 %    Platelet Count 285 150 - 450 10e9/L    Diff Method Automated Method     % Neutrophils 67.7 %    % Lymphocytes 19.8 %    % Monocytes 11.3 %    % Eosinophils 0.0 %    % Basophils 0.6 %    % Immature Granulocytes 0.6 %    Absolute Neutrophil 4.2 1.6 - 8.3 10e9/L    Absolute Lymphocytes 1.2 0.8 - 5.3 10e9/L    Absolute Monocytes 0.7 0.0 - 1.3 10e9/L    Absolute Eosinophils 0.0 0.0 - 0.7 10e9/L    Absolute Basophils 0.0 0.0 - 0.2 10e9/L    Abs Immature Granulocytes 0.0 0 - 0.4 10e9/L   Basic metabolic panel   Result Value Ref Range    Sodium 136 134 - 144 mmol/L    Potassium 4.2 3.5 - 5.1 mmol/L    Chloride 102 98 - 107 mmol/L    Carbon Dioxide 27 21 - 31 mmol/L    Anion Gap 7 3 - 14 mmol/L    Glucose 111 (H) 70 - 105 mg/dL    Urea Nitrogen 24 7 - 25 mg/dL    Creatinine 1.09 0.60 - 1.20 mg/dL    GFR Estimate 47 (L) >60 mL/min/1.7m2    GFR Estimate If Black 57 (L) >60 mL/min/1.7m2    Calcium 8.9 8.6 - 10.3 mg/dL   *UA reflex to Microscopic   Result Value Ref Range    Color Urine Yellow     Appearance Urine Clear     Glucose Urine Negative NEG^Negative mg/dL    Bilirubin Urine Negative NEG^Negative    Ketones Urine Negative NEG^Negative mg/dL    Specific Gravity Urine 1.010 1.003 - 1.035    Blood Urine Negative NEG^Negative    pH Urine 6.0 5.0 - 7.0 pH    Protein Albumin Urine Negative NEG^Negative mg/dL    Urobilinogen Urine 0.2 0.2 - 1.0 EU/dL    Nitrite Urine Negative NEG^Negative    Leukocyte Esterase Urine Negative NEG^Negative    Source Midstream Urine        Medications - No data to display    3:49 PM Daughter Aliza is here Riky  reviewed benign exam, labs, and CT scans.  Patient is ready for d/c home.    Assessments & Plan (with Medical Decision Making)   88-year-old female who is been falling more frequently had a trip and fall today at home witnessed by her daughter.  She struck the floor on her knees and then with her forehead wrenching her neck against a furniture  leg.  She had no loss of consciousness.  She arrives via EMS complaining of neck pain.  Her examination shows mild contusions to her knees but normal range of motion and no joint effusion and mild contusion to her right hand without ecchymoses or any specific bony tenderness.  No x-rays of her extremities were done.  She does have CT scan of head and C-spine without acute abnormalities.  She has laboratory evaluation including CBC and basic chemistries and UA which are negative for any obvious abnormalities.  She has EKG that is normal.  I reassured patient and her daughter who is here to pick her up.  I have encouraged her to follow-up in clinic for recheck and to discuss possible physical therapy referral to work on gait, strength, and fall prevention.    I have reviewed the nursing notes.    I have reviewed the findings, diagnosis, plan and need for follow up with the patient.       New Prescriptions    No medications on file       Final diagnoses:   Fall from standing, initial encounter   Strain of neck muscle, initial encounter   Contusion of knee, unspecified laterality, initial encounter - bilateral knees.   Contusion of right hand, initial encounter   Falls frequently       6/15/2018   [facility] [place] [facility] polk MD  06/15/18 [id number]

## 2018-06-15 NOTE — ED NOTES
Patient ambulated to bathroom with SBA, tolerated activity. Denies dizziness. Voided 200 ml clear yellow urine. Sent to lab per order.

## 2018-06-15 NOTE — ED TRIAGE NOTES
Patient arrives from home with Meds-1 with c/o fall. She tripped over a chair causing her to fall. Per daughter she hit her left forehead on a chair. No sign of injury on head. Patient alert and able to answer questions appropriately.

## 2018-06-15 NOTE — ED NOTES
COLUMBIA-SUICIDE SEVERITY RATING SCALE   Screen with Triage Points for Emergency Department      Ask questions that are bolded and underlined.   Past  month   Ask Questions 1 and 2 YES NO   1)  Have you wished you were dead or wished you could go to sleep and not wake up?   x   2)  Have you actually had any thoughts of killing yourself?   x   If YES to 2, ask questions 3, 4, 5, and 6.  If NO to 2, go directly to question 6.   3)  Have you been thinking about how you might do this?   E.g.  I thought about taking an overdose but I never made a specific plan as to when where or how I would actually do it .and I would never go through with it.       4)  Have you had these thoughts and had some intention of acting on them?   As opposed to  I have the thoughts but I definitely will not do anything about them.       5)  Have you started to work out or worked out the details of how to kill yourself? Do you intend to carry out this plan?      6)  Have you ever done anything, started to do anything, or prepared to do anything to end your life?  Examples: Collected pills, obtained a gun, gave away valuables, wrote a will or suicide note, took out pills but didn t swallow any, held a gun but changed your mind or it was grabbed from your hand, went to the roof but didn t jump; or actually took pills, tried to shoot yourself, cut yourself, tried to hang yourself, etc.    If YES, ask: Was this within the past three months?  Lifetime         Past 3 Months     x   Item 1:  Behavioral Health Referral at Discharge  Item 2:  Behavioral Health Referral at Discharge   Item 3:  Behavioral Health Consult (Psychiatric Nurse/) and consider Patient Safety Precautions  Item 4:  Immediate Notification of Physician and/or Behavioral Health and Patient Safety Precautions   Item 5:  Immediate Notification of Physician and/or Behavioral Health and Patient Safety Precautions  Item 6:  Over 3 months ago: Behavioral Health Consult  (Psychiatric Nurse/) and consider Patient Safety Precautions  OR  Item 6:  3 months ago or less: Immediate Notification of Physician and/or Behavioral Health and Patient Safety Precautions

## 2018-06-15 NOTE — ED AVS SNAPSHOT
[facility]    [address]    [place] [address]    Phone:  [phone]    Fax:  [phone]                                       Virginia Leone   MRN: [id number]    Department:  [facility]   Date of Visit:  6/15/2018           Patient Information     Date Of Birth          10/24/1929        Your diagnoses for this visit were:     Fall from standing, initial encounter     Strain of neck muscle, initial encounter     Contusion of knee, unspecified laterality, initial encounter bilateral knees.    Contusion of right hand, initial encounter     Falls frequently        You were seen by Tres Sosa MD.      Follow-up Information     Follow up with Sanjeev Jasso MD. Schedule an appointment as soon as possible for a visit in 1 week.    Specialty:  Family Practice    Contact information:    [email or web address]  [place] [address]  [phone]          Discharge Instructions       Dear Ms. Leone,  It was nice to see you.  I'm sorry you had another fall.  As we talked, your labs EKG and CT scans of neck and brain don't show any significant injuries or cause for your frequent falls.      However, falls can become more frequent as we age and our core muscles weaken and balance suffers.      Removing obstacles and transitions at home can decrease falls.      Doing exercises to increase strength and balance can help avoid falls.      Follow up in clinic to discuss home evaluation and options for PT.    Follow up in clinic as needed for any persistent or new pains.    Dr. Abad Sosa        Discharge References/Attachments     FALLS, PREVENTING, ARE YOU AT RISK OF FALLING? (ENGLISH)    FALLS, PREVENTING, MAKING CHANGES IN YOUR LIVING SPACE (ENGLISH)    FALLS, PREVENTING, EXERCISES TO IMPROVE BALANCE, FLEXIBILITY, STRENGTH, AND STAYING POWER (ENGLISH)      24 Hour Appointment Hotline     To schedule an appointment at [facility], please call  [phone]. If you don't have a family doctor or clinic, we will help you find one. [facility] clinics are conveniently located to serve the needs of you and your family.           Review of your medicines      Our records show that you are taking the medicines listed below. If these are incorrect, please call your family doctor or clinic.        Dose / Directions Last dose taken    aspirin 81 MG tablet   Dose:  81 mg        Take 81 mg by mouth daily with food   Refills:  0        fluticasone 50 MCG/ACT spray   Commonly known as:  FLONASE   Dose:  1 spray        Spray 1 spray into both nostrils 2 times daily   Refills:  0        losartan 50 MG tablet   Commonly known as:  COZAAR   Dose:  50 mg        Take 50 mg by mouth daily   Refills:  0        metoprolol tartrate 25 MG tablet   Commonly known as:  LOPRESSOR   Dose:  25 mg        Take 25 mg by mouth 2 times daily   Refills:  0        omeprazole 20 MG CR capsule   Commonly known as:  priLOSEC   Dose:  20 mg        Take 20 mg by mouth daily before a meal.   Refills:  0        venlafaxine 50 MG tablet   Commonly known as:  EFFEXOR   Dose:  50 mg        Take 50 mg by mouth every morning   Refills:  0                Procedures and tests performed during your visit     *UA reflex to Microscopic    Basic metabolic panel    CBC with platelets differential    CT Cervical Spine w/o Contrast    CT Head w/o Contrast    EKG 12-lead, tracing only      Orders Needing Specimen Collection     None      Pending Results     No orders found from 6/13/2018 to 6/16/2018.            Pending Culture Results     No orders found from 6/13/2018 to 6/16/2018.            Pending Results Instructions     If you had any lab results that were not finalized at the time of your Discharge, you can call the ED Lab Result RN at [phone]. You will be contacted by this team for any positive Lab results or changes in treatment. The nurses are available 7 days a week from 10A to 6:30P.  You can leave  "a message 24 hours per day and they will return your call.        Thank you for choosing [facility]       Thank you for choosing [facility] for your care. Our goal is always to provide you with excellent care. Hearing back from our patients is one way we can continue to improve our services. Please take a few minutes to complete the written survey that you may receive in the mail after you visit with us. Thank you!        [employer] Information     [employer] lets you send messages to your doctor, view your test results, renew your prescriptions, schedule appointments and more. To sign up, go to [email or web address] . Click on \"Log in\" on the left side of the screen, which will take you to the Welcome page. Then click on \"Sign up Now\" on the right side of the page.     You will be asked to enter the access code listed below, as well as some personal information. Please follow the directions to create your username and password.     Your access code is: [email or web address]  Expires: 2018  3:58 PM     Your access code will  in 90 days. If you need help or a new code, please call your [facility] clinic or [phone].        Care EveryWhere ID     This is your Care EveryWhere ID. This could be used by other organizations to access your [facility] medical records  [id number]        Equal Access to Services     ARI MCCALL : Hadii osmin darby Sojon, waaxda luqadaha, qaybta kaalmada adeegyada, esequiel byrd . So [facility] [phone].    ATENCIÓN: Si habla español, tiene a elias disposición servicios gratuitos de asistencia lingüística. Llame al [phone].    We comply with applicable federal civil rights laws and [place] laws. We do not discriminate on the basis of race, color, national origin, age, disability, sex, sexual orientation, or gender identity.            After Visit Summary       This is your record. Keep this with you and show to your community pharmacist(s) and doctor(s) at your " next visit.

## 2018-07-23 NOTE — PROGRESS NOTES
Patient Information     Patient Name  Virginia Leone MRN  [id number] Sex  Female   10/24/1929      Letter by Sanjeev Jasso MD at      Author:  Sanjeev Jasso MD Service:  (none) Author Type:  (none)    Filed:   Encounter Date:  2017 Status:  (Other)           Virginia Leone   [facility] [address]          2017    Dear Ms. Leone:    This is to remind you that you are due for your annual medication management appointment with Sanjeev Jasso MD. Your last office visit was on 16. Additional refills of your medication require you to complete this visit.    Please call [phone] to schedule your appointment.    Thank you for choosing [facility] for your health care needs.    Sincerely,      Refill RN  [facility]

## 2018-07-23 NOTE — PROGRESS NOTES
Patient Information     Patient Name  Virginia Leone MRN  [id number] Sex  Female   10/24/1929      Letter by Sanjeev Jasso MD at      Author:  Sanjeev Jasso MD Service:  (none) Author Type:  (none)    Filed:   Encounter Date:  2017 Status:  (Other)           Virginia Leone   [facility] [address]          2017    Dear Ms. Leone:    A 90 day refill of the following medications have been called into your pharmacy.       venlafaxine (EFFEXOR) 50 mg tablet      losartan (COZAAR) 50 mg tablet      metoprolol tartrate (LOPRESSOR) 50 mg tablet    Additional refills require an annual office visit with Sanjeev Jasso MD.   Please call the clinic at [phone] schedule your appointment.    If you should require additional refills before your scheduled appointment, please contact your pharmacy and we will refill your medication until that date.      Thank you,    The Refill Nurse  [facility]

## 2018-11-24 DIAGNOSIS — K21.9 GASTROESOPHAGEAL REFLUX DISEASE, ESOPHAGITIS PRESENCE NOT SPECIFIED: Primary | ICD-10-CM

## 2018-11-24 NOTE — LETTER
November 27, 2018      Virginia Leone  [address] [address]        Dear Virginia,    This letter is to remind you that you are due for your annual exam with Sanjeev Jasso. Your last comprehensive visit was more than 12 months ago.    Please call the clinic at [phone] to schedule your appointment.    If you should require additional refills before your scheduled appointment, please contact your pharmacy and we will refill your medication until the date of your appointment.    If you are no longer seeing Sanjeev Jasso for primary care, please call to let us know. Doing so will remove you from our call/contact list.      Thank you for choosing [facility] for your health care needs.    Sincerely,    Refill MERISSA  [facility]

## 2018-11-27 NOTE — TELEPHONE ENCOUNTER
Mick JAMESON sent Rx request for the following:     OMEPRAZOLE 20MG CAPSULES  TAKE 1 CAPSULE BY MOUTH EVERY DAY BEFORE A MEAL  Last Written Prescription Date:  11/3/17  Last Fill Quantity: 90,   # refills: 3    Last Office Visit: 11/3/17  Future Office visit: None.    Routing refill request to provider for review/approval because:  PPI Protocol [id number] 11:16 AM   No diagnosis of osteoporosis on record    Recent (12 mo) or future (30 days) visit within the authorizing provider's specialty     Patient due for annual exam. Reminder letter sent to Patient at this time.     Unable to complete prescription refill per RN Medication Refill Policy. Martina Hatfield RN .............. 11/27/2018  11:27 AM

## 2019-01-02 ENCOUNTER — DOCUMENTATION ONLY (OUTPATIENT)
Dept: OTHER | Facility: CLINIC | Age: 84
End: 2019-01-02

## 2019-01-18 DIAGNOSIS — F41.8 DEPRESSION WITH ANXIETY: Primary | ICD-10-CM

## 2019-01-18 DIAGNOSIS — I10 ESSENTIAL HYPERTENSION: ICD-10-CM

## 2019-01-22 RX ORDER — METOPROLOL TARTRATE 25 MG/1
TABLET, FILM COATED ORAL
Qty: 60 TABLET | Refills: 0 | Status: SHIPPED | OUTPATIENT
Start: 2019-01-22 | End: 2019-02-27

## 2019-01-22 RX ORDER — LOSARTAN POTASSIUM 50 MG/1
TABLET ORAL
Qty: 30 TABLET | Refills: 0 | Status: SHIPPED | OUTPATIENT
Start: 2019-01-22 | End: 2019-02-27

## 2019-01-22 RX ORDER — VENLAFAXINE 50 MG/1
TABLET ORAL
Qty: 30 TABLET | Refills: 0 | Status: SHIPPED | OUTPATIENT
Start: 2019-01-22 | End: 2019-02-27

## 2019-01-22 NOTE — TELEPHONE ENCOUNTER
Mick GR sent Rx request for the following:      METOPROLOL TARTRATE 25MG TABLETS  Sig: TAKE 1 TABLET BY MOUTH TWICE DAILY  Last Written Prescription Date:  11/3/17  Last Fill Quantity: 180,   # refills: 3    Beta-Blockers Protocol Failed 8:58 AM   Blood pressure under 140/90 in past 12 months    Recent (12 mo) or future (30 days) visit within the authorizing provider's specialty     LOSARTAN 50MG TABLETS  Sig: TAKE 1 TABLET BY MOUTH EVERY DAY  Last Written Prescription Date:  11/3/17  Last Fill Quantity: 90,   # refills: 3    Angiotensin-II Receptors Failed 8:58 AM   Blood pressure under 140/90 in past 12 months    Recent (12 mo) or future (30 days) visit within the authorizing provider's specialty     VENLAFAXINE 50MG TABLETS  Sig: TAKE 1 TABLET BY MOUTH EVERY MORNING  Last Written Prescription Date:  11/3/17  Last Fill Quantity: 90,   # refills: 3    Serotonin-Norepinephrine Reuptake Inhibitors Failed 8:58 AM   Blood pressure under 140/90 in past 12 months    Recent (12 mo) or future (30 days) visit within the authorizing provider's specialty     Last Office Visit: 11/3/17  Future Office visit: None.    Patient overdue for annual exam. Per chart review, Pt was sent reminder letter on 18. Called and spoke with Patient's daughter, after verifying Patient's last name and . Patient was sleeping. She was notified that Pt had refill request come and she is overdue for annual exam. She verbalized plan to have her sister who does the scheduling, call back to make an appointment and confirmed that a 30-day limited supply would be sufficient at this time.     Medication is being filled for 1 time refill only due to:  Patient needs to be seen because it has been more than one year since last visit.     Martina Hatfield RN .............. 2019  9:37 AM

## 2019-02-12 ENCOUNTER — DOCUMENTATION ONLY (OUTPATIENT)
Dept: OTHER | Facility: CLINIC | Age: 84
End: 2019-02-12

## 2019-02-27 ENCOUNTER — OFFICE VISIT (OUTPATIENT)
Dept: FAMILY MEDICINE | Facility: OTHER | Age: 84
End: 2019-02-27
Attending: FAMILY MEDICINE
Payer: MEDICARE

## 2019-02-27 VITALS
HEIGHT: 63 IN | DIASTOLIC BLOOD PRESSURE: 76 MMHG | HEART RATE: 92 BPM | TEMPERATURE: 98.3 F | BODY MASS INDEX: 22.36 KG/M2 | WEIGHT: 126.2 LBS | SYSTOLIC BLOOD PRESSURE: 170 MMHG

## 2019-02-27 DIAGNOSIS — K21.9 GASTROESOPHAGEAL REFLUX DISEASE, ESOPHAGITIS PRESENCE NOT SPECIFIED: ICD-10-CM

## 2019-02-27 DIAGNOSIS — J43.9 PULMONARY EMPHYSEMA, UNSPECIFIED EMPHYSEMA TYPE (H): ICD-10-CM

## 2019-02-27 DIAGNOSIS — J30.89 SEASONAL ALLERGIC RHINITIS DUE TO OTHER ALLERGIC TRIGGER: ICD-10-CM

## 2019-02-27 DIAGNOSIS — I10 ESSENTIAL HYPERTENSION: ICD-10-CM

## 2019-02-27 DIAGNOSIS — Z23 NEED FOR PROPHYLACTIC VACCINATION AND INOCULATION AGAINST INFLUENZA: ICD-10-CM

## 2019-02-27 DIAGNOSIS — F41.8 DEPRESSION WITH ANXIETY: ICD-10-CM

## 2019-02-27 DIAGNOSIS — J30.89 SEASONAL ALLERGIC RHINITIS DUE TO OTHER ALLERGIC TRIGGER: Primary | ICD-10-CM

## 2019-02-27 DIAGNOSIS — Z23 IMMUNIZATION, PNEUMOCOCCUS AND INFLUENZA: ICD-10-CM

## 2019-02-27 LAB
ANION GAP SERPL CALCULATED.3IONS-SCNC: 8 MMOL/L (ref 3–14)
BUN SERPL-MCNC: 23 MG/DL (ref 7–25)
CALCIUM SERPL-MCNC: 9.3 MG/DL (ref 8.6–10.3)
CHLORIDE SERPL-SCNC: 104 MMOL/L (ref 98–107)
CO2 SERPL-SCNC: 26 MMOL/L (ref 21–31)
CREAT SERPL-MCNC: 0.99 MG/DL (ref 0.6–1.2)
ERYTHROCYTE [DISTWIDTH] IN BLOOD BY AUTOMATED COUNT: 14.1 % (ref 10–15)
GFR SERPL CREATININE-BSD FRML MDRD: 53 ML/MIN/{1.73_M2}
GLUCOSE SERPL-MCNC: 103 MG/DL (ref 70–105)
HCT VFR BLD AUTO: 34.2 % (ref 35–47)
HGB BLD-MCNC: 11 G/DL (ref 11.7–15.7)
MCH RBC QN AUTO: 32.7 PG (ref 26.5–33)
MCHC RBC AUTO-ENTMCNC: 32.2 G/DL (ref 31.5–36.5)
MCV RBC AUTO: 102 FL (ref 78–100)
PLATELET # BLD AUTO: 283 10E9/L (ref 150–450)
POTASSIUM SERPL-SCNC: 4.2 MMOL/L (ref 3.5–5.1)
RBC # BLD AUTO: 3.36 10E12/L (ref 3.8–5.2)
SODIUM SERPL-SCNC: 138 MMOL/L (ref 134–144)
WBC # BLD AUTO: 6.7 10E9/L (ref 4–11)

## 2019-02-27 PROCEDURE — 90471 IMMUNIZATION ADMIN: CPT | Performed by: FAMILY MEDICINE

## 2019-02-27 PROCEDURE — G0008 ADMIN INFLUENZA VIRUS VAC: HCPCS | Performed by: FAMILY MEDICINE

## 2019-02-27 PROCEDURE — 90662 IIV NO PRSV INCREASED AG IM: CPT

## 2019-02-27 PROCEDURE — G0008 ADMIN INFLUENZA VIRUS VAC: HCPCS

## 2019-02-27 PROCEDURE — G0009 ADMIN PNEUMOCOCCAL VACCINE: HCPCS

## 2019-02-27 PROCEDURE — 90732 PPSV23 VACC 2 YRS+ SUBQ/IM: CPT

## 2019-02-27 PROCEDURE — G0463 HOSPITAL OUTPT CLINIC VISIT: HCPCS | Mod: 25

## 2019-02-27 PROCEDURE — G0463 HOSPITAL OUTPT CLINIC VISIT: HCPCS

## 2019-02-27 PROCEDURE — G0009 ADMIN PNEUMOCOCCAL VACCINE: HCPCS | Performed by: FAMILY MEDICINE

## 2019-02-27 PROCEDURE — 80048 BASIC METABOLIC PNL TOTAL CA: CPT | Performed by: FAMILY MEDICINE

## 2019-02-27 PROCEDURE — 36415 COLL VENOUS BLD VENIPUNCTURE: CPT | Performed by: FAMILY MEDICINE

## 2019-02-27 PROCEDURE — 85027 COMPLETE CBC AUTOMATED: CPT | Performed by: FAMILY MEDICINE

## 2019-02-27 PROCEDURE — 99214 OFFICE O/P EST MOD 30 MIN: CPT | Performed by: FAMILY MEDICINE

## 2019-02-27 RX ORDER — VENLAFAXINE HYDROCHLORIDE 75 MG/1
75 CAPSULE, EXTENDED RELEASE ORAL DAILY
Qty: 90 CAPSULE | Refills: 3 | Status: SHIPPED | OUTPATIENT
Start: 2019-02-27 | End: 2020-02-12

## 2019-02-27 RX ORDER — FLUTICASONE PROPIONATE 50 MCG
1 SPRAY, SUSPENSION (ML) NASAL 2 TIMES DAILY
Qty: 16 G | Refills: 0 | Status: SHIPPED | OUTPATIENT
Start: 2019-02-27 | End: 2022-11-08

## 2019-02-27 RX ORDER — METOPROLOL TARTRATE 25 MG/1
25 TABLET, FILM COATED ORAL 2 TIMES DAILY
Qty: 180 TABLET | Refills: 3 | Status: SHIPPED | OUTPATIENT
Start: 2019-02-27 | End: 2020-02-12

## 2019-02-27 RX ORDER — LOSARTAN POTASSIUM 50 MG/1
50 TABLET ORAL DAILY
Qty: 90 TABLET | Refills: 3 | Status: SHIPPED | OUTPATIENT
Start: 2019-02-27 | End: 2020-02-12

## 2019-02-27 RX ORDER — VENLAFAXINE 50 MG/1
50 TABLET ORAL EVERY MORNING
Qty: 90 TABLET | Refills: 3 | Status: SHIPPED | OUTPATIENT
Start: 2019-02-27 | End: 2019-02-27

## 2019-02-27 ASSESSMENT — PAIN SCALES - GENERAL: PAINLEVEL: NO PAIN (0)

## 2019-02-27 ASSESSMENT — MIFFLIN-ST. JEOR: SCORE: 966.57

## 2019-02-27 NOTE — Clinical Note
Please call to have patient come in for BP recheck by nursing.  She did not take her medications on the day of the visit so it was tough to tell if she needs dosage increases.

## 2019-02-27 NOTE — PROGRESS NOTES
Injectable Influenza Immunization Documentation    1.  Is the person to be vaccinated sick today?   No    2. Does the person to be vaccinated have an allergy to a component   of the vaccine?   No  Egg Allergy Algorithm Link    3. Has the person to be vaccinated ever had a serious reaction   to influenza vaccine in the past?   No    4. Has the person to be vaccinated ever had Guillain-Barré syndrome?   No    Form completed by Tammy Phillip LPN on 2/27/2019 at 3:03 PM

## 2019-02-27 NOTE — NURSING NOTE
Patient presents today for medication management.    Medication Reconciliation Complete    Tammy Phillip LPN  2/27/2019 1:59 PM

## 2019-02-27 NOTE — LETTER
February 28, 2019      Virgin[place] Leone  [address] [place] [address]        Dear ,    We are writing to inform you of your test results.    All of your labs are stable.  Please continue on your current medications    Resulted Orders   Basic Metabolic Panel   Result Value Ref Range    Sodium 138 134 - 144 mmol/L    Potassium 4.2 3.5 - 5.1 mmol/L    Chloride 104 98 - 107 mmol/L    Carbon Dioxide 26 21 - 31 mmol/L    Anion Gap 8 3 - 14 mmol/L    Glucose 103 70 - 105 mg/dL    Urea Nitrogen 23 7 - 25 mg/dL    Creatinine 0.99 0.60 - 1.20 mg/dL    GFR Estimate 53 (L) >60 mL/min/[1.73_m2]    GFR Estimate If Black 64 >60 mL/min/[1.73_m2]    Calcium 9.3 8.6 - 10.3 mg/dL   CBC W PLT No Diff   Result Value Ref Range    WBC 6.7 4.0 - 11.0 10e9/L    RBC Count 3.36 (L) 3.8 - 5.2 10e12/L    Hemoglobin 11.0 (L) 11.7 - 15.7 g/dL    Hematocrit 34.2 (L) 35.0 - 47.0 %     (H) 78 - 100 fl    MCH 32.7 26.5 - 33.0 pg    MCHC 32.2 31.5 - 36.5 g/dL    RDW 14.1 10.0 - 15.0 %    Platelet Count 283 150 - 450 10e9/L       If you have any questions or concerns, please call the clinic at the number listed above.       Sincerely,        Sanjeev Jasso MD

## 2019-02-28 RX ORDER — FLUTICASONE PROPIONATE 50 MCG
SPRAY, SUSPENSION (ML) NASAL
Qty: 48 ML | Refills: 0 | OUTPATIENT
Start: 2019-02-28

## 2019-02-28 NOTE — TELEPHONE ENCOUNTER
flonase noted as refilled on 2/27/19 16 G to [facility]    Aida Barker RN on 2/28/2019 at 11:31 AM

## 2019-03-11 NOTE — PROGRESS NOTES
Nursing Notes:   Tammy Phillip LPN  2/27/2019  2:03 PM  Signed  Patient presents today for medication management.    Medication Reconciliation Complete    Tammy Phillip LPN  2/27/2019 1:59 PM      SUBJECTIVE:  Virginia Leone is a 89 year old female who comes in today with her daughters to follow-up on multiple chronic issues.  She is here to follow-up on hypertension.  Continues to take metoprolol and losartan without side effect.  No orthostasis.  No falls.  Has not had any chest pain or definite anginal equivalents.    She reports that she has had some insidious onset shortness of breath.  At times can feel wheezy.  Seems to be especially bothered by seasonal allergies and nasal discharge.  No fevers or chills recently.    She reports that she thinks her mood is fairly stable but daughters feel that she has had lack of motivation and become more cantankerous.  Patient admits that that may be true.  She is currently taking 50 mg of Effexor daily and they are wondering about increasing the dosage.  She has not noticed any side effects from the medication.    She denies any breakthrough symptoms of acid reflux.  No black or bloody stools.  No other changes in bowel movements.  No issues with urination.            PHQ-9  PHQ-9 SCORE 4/22/2015 7/12/2016   PHQ-9 Total Score 9 6         Current Outpatient Medications   Medication Sig Dispense Refill     aspirin 81 MG tablet Take 81 mg by mouth daily with food       fluticasone (FLONASE) 50 MCG/ACT nasal spray Spray 1 spray into both nostrils 2 times daily 16 g 0     losartan (COZAAR) 50 MG tablet Take 1 tablet (50 mg) by mouth daily 90 tablet 3     metoprolol tartrate (LOPRESSOR) 25 MG tablet Take 1 tablet (25 mg) by mouth 2 times daily 180 tablet 3     omeprazole (PRILOSEC) 20 MG DR capsule TAKE 1 CAPSULE BY MOUTH EVERY DAY BEFORE A MEAL 90 capsule 3     umeclidinium (INCRUSE ELLIPTA) 62.5 MCG/INH inhaler Inhale 1 puff into the lungs daily 30 each 1      venlafaxine (EFFEXOR-XR) 75 MG 24 hr capsule Take 1 capsule (75 mg) by mouth daily 90 capsule 3         Social History     Socioeconomic History     Marital status:      Spouse name: Not on file     Number of children: Not on file     Years of education: Not on file     Highest education level: Not on file   Occupational History     Not on file   Social Needs     Financial resource strain: Not on file     Food insecurity:     Worry: Not on file     Inability: Not on file     Transportation needs:     Medical: Not on file     Non-medical: Not on file   Tobacco Use     Smoking status: Never Smoker     Smokeless tobacco: Never Used   Substance and Sexual Activity     Alcohol use: No     Drug use: No     Sexual activity: Not on file   Lifestyle     Physical activity:     Days per week: Not on file     Minutes per session: Not on file     Stress: Not on file   Relationships     Social connections:     Talks on phone: Not on file     Gets together: Not on file     Attends Confucianism service: Not on file     Active member of club or organization: Not on file     Attends meetings of clubs or organizations: Not on file     Relationship status: Not on file     Intimate partner violence:     Fear of current or ex partner: Not on file     Emotionally abused: Not on file     Physically abused: Not on file     Forced sexual activity: Not on file   Other Topics Concern     Not on file   Social History Narrative    Patient is .  She reports no tobacco use.  No alcohol.       I have personally reviewed and updated above noted social, family and/or past medical history.    ROS  CONSTITUTIONAL: NEGATIVE for fever, chills, change in weight  ENT/MOUTH: NEGATIVE for ear, mouth and throat problems  RESP: NEGATIVE for significant cough or SOB  CV: NEGATIVE for chest pain, palpitations or peripheral edema  GI: NEGATIVE for nausea, abdominal pain, heartburn, or change in bowel habits  MUSCULOSKELETAL: NEGATIVE for changes in  "chronic arthralgias   PSYCHIATRIC: POSITIVE for increased lack of motivation per family.  Patient does not feel depressed per se.      /76   Pulse 92   Temp 98.3  F (36.8  C) (Temporal)   Ht 1.6 m (5' 3\")   Wt 57.2 kg (126 lb 3.2 oz)   BMI 22.36 kg/m      Exam:  GENERAL: healthy, alert and no distress  NECK: no adenopathy, no asymmetry, masses, or scars and thyroid normal to palpation  RESP: Lung sounds distant.  No rales rhonchi or wheezes presently.  CV: regular rate and rhythm, normal S1 S2, no S3 or S4, no murmur, click or rub, no peripheral edema and peripheral pulses strong  ABDOMEN: soft, nontender, no hepatosplenomegaly, no masses and bowel sounds normal  MS: no gross musculoskeletal defects noted, no edema      ASSESSMENT/Plan :    I personally reviewed the patients' labs    Results for orders placed or performed in visit on 02/27/19   Basic Metabolic Panel   Result Value Ref Range    Sodium 138 134 - 144 mmol/L    Potassium 4.2 3.5 - 5.1 mmol/L    Chloride 104 98 - 107 mmol/L    Carbon Dioxide 26 21 - 31 mmol/L    Anion Gap 8 3 - 14 mmol/L    Glucose 103 70 - 105 mg/dL    Urea Nitrogen 23 7 - 25 mg/dL    Creatinine 0.99 0.60 - 1.20 mg/dL    GFR Estimate 53 (L) >60 mL/min/[1.73_m2]    GFR Estimate If Black 64 >60 mL/min/[1.73_m2]    Calcium 9.3 8.6 - 10.3 mg/dL   CBC W PLT No Diff   Result Value Ref Range    WBC 6.7 4.0 - 11.0 10e9/L    RBC Count 3.36 (L) 3.8 - 5.2 10e12/L    Hemoglobin 11.0 (L) 11.7 - 15.7 g/dL    Hematocrit 34.2 (L) 35.0 - 47.0 %     (H) 78 - 100 fl    MCH 32.7 26.5 - 33.0 pg    MCHC 32.2 31.5 - 36.5 g/dL    RDW 14.1 10.0 - 15.0 %    Platelet Count 283 150 - 450 10e9/L       No images are attached to the encounter or orders placed in the encounter.      1. Essential hypertension  Blood pressure is quite elevated but patient reports she did not take any of her medications this morning..  Continue current dose of metoprolol and losartan and come in for a nurse only check " to ensure that her blood pressure has normalized.  Is borderline elevated October 2017 but was quite elevated in the emergency department of June 2018 after fall.  - metoprolol tartrate (LOPRESSOR) 25 MG tablet; Take 1 tablet (25 mg) by mouth 2 times daily  Dispense: 180 tablet; Refill: 3  - losartan (COZAAR) 50 MG tablet; Take 1 tablet (50 mg) by mouth daily  Dispense: 90 tablet; Refill: 3  - CBC W PLT No Diff; Future  - Basic Metabolic Panel; Future  - Basic Metabolic Panel  - CBC W PLT No Diff    2. Gastroesophageal reflux disease, esophagitis presence not specified  Continue with current dose of omeprazole.  - omeprazole (PRILOSEC) 20 MG DR capsule; TAKE 1 CAPSULE BY MOUTH EVERY DAY BEFORE A MEAL  Dispense: 90 capsule; Refill: 3    3. Depression with anxiety  After discussion with patient and family we have elected to increase her Effexor slightly.  Follow-up in about a month with new provider to ensure that symptoms are improved.  - venlafaxine (EFFEXOR-XR) 75 MG 24 hr capsule; Take 1 capsule (75 mg) by mouth daily  Dispense: 90 capsule; Refill: 3    4. Seasonal allergic rhinitis due to other allergic trigger  Suggest nasal corticosteroid on a twice daily basis through the spring.  - fluticasone (FLONASE) 50 MCG/ACT nasal spray; Spray 1 spray into both nostrils 2 times daily  Dispense: 16 g; Refill: 0    5. Pulmonary emphysema, unspecified emphysema type (H)  Clinically patient likely to have emphysema.  Clients pulmonary function testing today which I think is reasonable as we can do a trial of therapy.  I do think it would be reasonable to trial long-acting muscarinic antagonist to see if we can improve her respiratory status.  - umeclidinium (INCRUSE ELLIPTA) 62.5 MCG/INH inhaler; Inhale 1 puff into the lungs daily  Dispense: 30 each; Refill: 1    6. Immunization, pneumococcus and influenza  Patient is due for pneumococcal vaccine  - ADMIN INFLUENZA VIRUS VAC  - ADMIN: Vaccine, Initial (84059)  - ADMIN  MEDICARE: Pneumococcal Vaccine ()    7. Need for prophylactic vaccination and inoculation against influenza  Just influenza vaccine  - HC FLU VACCINE, INCREASED ANTIGEN, PRESV FREE              There are no Patient Instructions on file for this visit.    Sanjeev Jasso    This document was created using computer generated templates and voiceactivated software.

## 2019-03-19 NOTE — PROGRESS NOTES
I spoke with patient's daughter Mickie. She will bring Virginia in to the clinic for a blood pressure recheck as requested by Dr Jasso.

## 2020-02-12 DIAGNOSIS — F41.8 DEPRESSION WITH ANXIETY: ICD-10-CM

## 2020-02-12 DIAGNOSIS — I10 ESSENTIAL HYPERTENSION: Primary | ICD-10-CM

## 2020-02-12 DIAGNOSIS — K21.9 GASTROESOPHAGEAL REFLUX DISEASE, ESOPHAGITIS PRESENCE NOT SPECIFIED: ICD-10-CM

## 2020-02-12 RX ORDER — METOPROLOL TARTRATE 25 MG/1
TABLET, FILM COATED ORAL
Qty: 180 TABLET | Refills: 0 | Status: SHIPPED | OUTPATIENT
Start: 2020-02-12 | End: 2020-05-12

## 2020-02-12 RX ORDER — LOSARTAN POTASSIUM 50 MG/1
TABLET ORAL
Qty: 90 TABLET | Refills: 0 | Status: SHIPPED | OUTPATIENT
Start: 2020-02-12 | End: 2020-05-12

## 2020-02-12 RX ORDER — VENLAFAXINE HYDROCHLORIDE 75 MG/1
CAPSULE, EXTENDED RELEASE ORAL
Qty: 90 CAPSULE | Refills: 0 | Status: SHIPPED | OUTPATIENT
Start: 2020-02-12 | End: 2020-05-12

## 2020-02-12 NOTE — LETTER
February 12, 2020      Virginia Leone  [address] [address]        A refill of Metoprolol Tartrate 25 mg tablets, Omeprazole 20 mg capsules, Venlafaxine ER 75 mg capsules, Losartan 50 mg tablets and  has been requested by your pharmacy and we noticed that you are a former patient of Sanjeev Jasso MD.  As he is no longer seeing Patients in the clinic, we ask that you please contact us to schedule an establish care appointment with another one of our providers here at [facility].     A LIMITED refill has been called into your pharmacy. Additional refills require you to complete this appointment.    Your health is very important to us.  Please call the clinic at [phone] to schedule your appointment.    If you are no longer using [facility] for your healthcare needs, please call to let us know. Doing so will remove you from our call/contact list.    Thank you for choosing [facility] and [facility] for your health care needs.    Sincerely,    Refill RN  [facility]

## 2020-02-12 NOTE — TELEPHONE ENCOUNTER
Mick TRINO sent Rx request for the following:      METOPROLOL TARTRATE 25MG TABLETS  Sig: TAKE 1 TABLET(25 MG) BY MOUTH TWICE DAILY  Last Prescription Date:   2/27/19  Last Fill Qty/Refills:         180, R-3    Beta-Blockers Protocol Failed2/12 3:15 PM   Blood pressure under 140/90 in past 12 months  BP Readings from Last 3 Encounters:   02/27/19 170/76   06/15/18 163/73   11/03/17 142/80        OMEPRAZOLE 20MG CAPSULES  Sig: TAKE 1 CAPSULE BY MOUTH EVERY DAY BEFORE A MEAL  Last Prescription Date:   2/27/19  Last Fill Qty/Refills:         90, R-3    PPI Protocol Failed2/12 3:15 PM   No diagnosis of osteoporosis on record     VENLAFAXINE ER 75MG CAPSULES  Sig: TAKE 1 CAPSULE BY MOUTH DAILY  Last Prescription Date:   2/27/19  Last Fill Qty/Refills:         90, R-3    Serotonin-Norepinephrine Reuptake Inhibitors Failed2/12 3:15 PM   Blood pressure under 140/90 in past 12 months      PHQ-9 score of less than 5 in past 6 months    Recent (6 mo) or future (30 days) visit within the authorizing provider's specialty     LOSARTAN 50MG TABLETS  Sig: TAKE 1 TABLET(50 MG) BY MOUTH DAILY  Last Prescription Date:   2/27/19  Last Fill Qty/Refills:         90, R-3    Angiotensin-II Receptors Failed2/12 3:15 PM   Last blood pressure under 140/90 in past 12 months     Last Office Visit:              2/27/19 (Former PCP: Dr. Jasso)   Future Office visit:           None.    Patient nearly due for annual exam. In the absence of Sanjeev Jasso MD, it is recommended that Patient call to schedule appointment with one of our other providers to discuss your medications and medication refills. At the same time, Pt can discuss you might intend on establishing care with.    Reminder letter sent with the above information. Prescriptions approved per [facility] Refill Protocol for 90-day shabbir refill. Martina Hatfield RN .............. 2/12/2020  3:51 PM

## 2020-08-07 DIAGNOSIS — K21.9 GASTROESOPHAGEAL REFLUX DISEASE, ESOPHAGITIS PRESENCE NOT SPECIFIED: ICD-10-CM

## 2020-08-07 DIAGNOSIS — I10 ESSENTIAL HYPERTENSION: ICD-10-CM

## 2020-08-07 DIAGNOSIS — F41.8 DEPRESSION WITH ANXIETY: ICD-10-CM

## 2020-08-07 NOTE — LETTER
August 13, 2020      Virginia Leone  [address] [address] [address]        Dear Virginia,       We have received a request for several medication refills from your pharmacy and it appears you are a former patient of Sanjeev Jasso MD.  As Dr. Jasso is no longer with the clinic, we ask that you please contact [facility] for assistance in scheduling a medication management appointment with another one of our providers.    The refill request for this medication can then be discussed and addressed accordingly with your new primary care physician.  Your health is very important to us.  Please call the clinic at [phone] to schedule your appointment.    A limited shabbir refill has been sent to your pharmacy to allow time to schedule your appointment.    Thank you for choosing [facility] and [facility] for your health care needs.    Sincerely,    Refill RN  [facility]

## 2020-08-13 RX ORDER — METOPROLOL TARTRATE 25 MG/1
TABLET, FILM COATED ORAL
Qty: 20 TABLET | Refills: 3 | Status: SHIPPED | OUTPATIENT
Start: 2020-08-13 | End: 2020-09-02

## 2020-08-13 RX ORDER — VENLAFAXINE HYDROCHLORIDE 75 MG/1
CAPSULE, EXTENDED RELEASE ORAL
Qty: 10 CAPSULE | Refills: 3 | Status: SHIPPED | OUTPATIENT
Start: 2020-08-13 | End: 2020-09-02

## 2020-08-13 RX ORDER — LOSARTAN POTASSIUM 50 MG/1
TABLET ORAL
Qty: 10 TABLET | Refills: 3 | Status: SHIPPED | OUTPATIENT
Start: 2020-08-13 | End: 2020-09-02

## 2020-08-13 NOTE — TELEPHONE ENCOUNTER
Pt out of [place]    Mick GR sent Rx request for the following:   omeprazole (PRILOSEC) 20 MG DR capsule  Sig:  TAKE 1 CAPSULE BY MOUTH EVERY DAY BEFORE A MEAL    Last Prescription Date:   5/12/2020  Last Fill Qty/Refills:         90, R-0    Last Office Visit:              2/27/2019   Future Office visit:           None    losartan (COZAAR) 50 MG tablet  Sig:  TAKE 1 TABLET(50 MG) BY MOUTH DAILY    Last Prescription Date:   5/12/2020  Last Fill Qty/Refills:         90, R-0    Last Office Visit:              2/27/2019   Future Office visit:           None    metoprolol tartrate (LOPRESSOR) 25 MG tablet  Sig:  TAKE 1 TABLET(25 MG) BY MOUTH TWICE DAILY    Last Prescription Date:   5/12/2020  Last Fill Qty/Refills:         180, R-0    Last Office Visit:              2/27/2019   Future Office visit:           None    venlafaxine (EFFEXOR-XR) 75 MG 24 hr capsule  Sig:  TAKE 1 CAPSULE BY MOUTH DAILY    Last Prescription Date:   5/12/2020  Last Fill Qty/Refills:         90, R-0    Last Office Visit:              2/27/2019   Future Office visit:           None    Pt is due for medication management appt. And labs with new PCP.  Will send appt reminder letter, add note to Rx, and send 30 day supply to pharmacy.    Laverne Momin RN  ....................  8/13/2020   12:57 PM

## 2020-09-02 ENCOUNTER — VIRTUAL VISIT (OUTPATIENT)
Dept: FAMILY MEDICINE | Facility: OTHER | Age: 85
End: 2020-09-02
Attending: FAMILY MEDICINE
Payer: MEDICARE

## 2020-09-02 DIAGNOSIS — I10 ESSENTIAL HYPERTENSION: ICD-10-CM

## 2020-09-02 DIAGNOSIS — F41.8 DEPRESSION WITH ANXIETY: ICD-10-CM

## 2020-09-02 DIAGNOSIS — K21.9 GASTROESOPHAGEAL REFLUX DISEASE, ESOPHAGITIS PRESENCE NOT SPECIFIED: Primary | ICD-10-CM

## 2020-09-02 PROCEDURE — 99442 ZZC PHYSICIAN TELEPHONE EVALUATION 11-20 MIN: CPT | Performed by: FAMILY MEDICINE

## 2020-09-02 RX ORDER — LOSARTAN POTASSIUM 50 MG/1
50 TABLET ORAL DAILY
Qty: 90 TABLET | Refills: 3 | Status: SHIPPED | OUTPATIENT
Start: 2020-09-02 | End: 2021-08-30

## 2020-09-02 RX ORDER — VENLAFAXINE HYDROCHLORIDE 75 MG/1
75 CAPSULE, EXTENDED RELEASE ORAL DAILY
Qty: 90 CAPSULE | Refills: 3 | Status: SHIPPED | OUTPATIENT
Start: 2020-09-02 | End: 2021-08-30

## 2020-09-02 RX ORDER — METOPROLOL TARTRATE 25 MG/1
TABLET, FILM COATED ORAL
Qty: 180 TABLET | Refills: 3 | Status: SHIPPED | OUTPATIENT
Start: 2020-09-02 | End: 2021-09-22

## 2020-09-02 NOTE — PROGRESS NOTES
"Virginia Leone is a 89 year old female who is being evaluated via a billable telephone visit.      The patient has been notified of following:     \"This telephone visit will be conducted via a call between you and your physician/provider. We have found that certain health care needs can be provided without the need for a physical exam.  This service lets us provide the care you need with a short phone conversation.  If a prescription is necessary we can send it directly to your pharmacy.  If lab work is needed we can place an order for that and you can then stop by our lab to have the test done at a later time.    Telephone visits are billed at different rates depending on your insurance coverage. During this emergency period, for some insurers they may be billed the same as an in-person visit.  Please reach out to your insurance provider with any questions.    If during the course of the call the physician/provider feels a telephone visit is not appropriate, you will not be charged for this service.\"    Patient has given verbal consent for Telephone visit?  Yes    What phone number would you like to be contacted at? [phone]    How would you like to obtain your AVS?     Subjective     Virginia Leone is a 89 year old female who presents via phone visit today for the following health issues:    HPI    Patient is a 89-year-old female who lives at home with her daughter Mickie, her primary caretaker.  Mickie is not available and Virginia is too hard of hearing.  The patient's granddaughter explains the situation.  Virginia is unwilling to leave home in light of the COVID-19 pandemic.  They understand that typically lab evaluation would be recommended with current medications.  But they are willing to forego this and would just like medications refilled at this time.  They will call if any additional concerns develop.    Review of Systems   Constitutional, HEENT, cardiovascular, pulmonary, gi and gu systems are " negative, except as otherwise noted.       Objective          Vitals:  No vitals were obtained today due to virtual visit.    healthy, alert and no distress  PSYCH: Alert and oriented times 3; coherent speech, normal   rate and volume, able to articulate logical thoughts, able   to abstract reason, no tangential thoughts, no hallucinations   or delusions  Her affect is normal  RESP: No cough, no audible wheezing, able to talk in full sentences  Remainder of exam unable to be completed due to telephone visits          Assessment/Plan:    Assessment & Plan     Gastroesophageal reflux disease, esophagitis presence not specified    - omeprazole (PRILOSEC) 20 MG DR capsule  Dispense: 90 capsule; Refill: 3    Essential hypertension    - losartan (COZAAR) 50 MG tablet  Dispense: 90 tablet; Refill: 3  - metoprolol tartrate (LOPRESSOR) 25 MG tablet  Dispense: 180 tablet; Refill: 3    Depression with anxiety    - venlafaxine (EFFEXOR-XR) 75 MG 24 hr capsule  Dispense: 90 capsule; Refill: 3       Continue current medications.   No plans for labs at this time, patient refuses to leave the house.   Family will call with concerns or questions.         No follow-ups on file.    Vickie Garcia MD  [facility]    Phone call duration:  15 minutes

## 2021-03-29 ENCOUNTER — VIRTUAL VISIT (OUTPATIENT)
Dept: FAMILY MEDICINE | Facility: OTHER | Age: 86
End: 2021-03-29
Attending: FAMILY MEDICINE
Payer: MEDICARE

## 2021-03-29 DIAGNOSIS — F03.918 SENILE DEMENTIA, WITH BEHAVIORAL DISTURBANCE (H): Primary | ICD-10-CM

## 2021-03-29 DIAGNOSIS — N18.31 STAGE 3A CHRONIC KIDNEY DISEASE (H): ICD-10-CM

## 2021-03-29 DIAGNOSIS — J44.9 CHRONIC OBSTRUCTIVE PULMONARY DISEASE, UNSPECIFIED COPD TYPE (H): ICD-10-CM

## 2021-03-29 DIAGNOSIS — F41.8 DEPRESSION WITH ANXIETY: ICD-10-CM

## 2021-03-29 DIAGNOSIS — I10 ESSENTIAL HYPERTENSION: ICD-10-CM

## 2021-03-29 PROCEDURE — 99443 PR PHYSICIAN TELEPHONE EVALUATION 21-30 MIN: CPT | Mod: 95 | Performed by: FAMILY MEDICINE

## 2021-03-29 NOTE — PROGRESS NOTES
Virginia is a 89 year old who is being evaluated via a billable telephone visit.      What phone number would you like to be contacted at? [phone]  How would you like to obtain your AVS? Mail a copy      ICD-10-CM    1. Senile dementia, with behavioral disturbance (H)  F03.91 Comprehensive metabolic panel     TSH     CBC with platelets differential     Sedimentation Rate (ESR)     UA reflex to Microscopic and Culture     CARE COORDINATION REFERRAL     CANCELED: UA reflex to Microscopic and Culture   2. Stage 3a chronic kidney disease  N18.31 Comprehensive metabolic panel     TSH     CBC with platelets differential     Sedimentation Rate (ESR)     UA reflex to Microscopic and Culture     CANCELED: UA reflex to Microscopic and Culture   3. Essential hypertension  I10 Comprehensive metabolic panel     TSH     CBC with platelets differential     Sedimentation Rate (ESR)     UA reflex to Microscopic and Culture     CANCELED: UA reflex to Microscopic and Culture   4. Depression with anxiety  F41.8    5. Chronic obstructive pulmonary disease, unspecified COPD type (H)  J44.9      1.  I suspect patient's behaviors are related to dementia.  However, infection, metabolic disorder, medication side effect, circulatory disorder cannot be excluded.  2.  Discussed with daughter having patient discontinue over-the-counter medication except for aspirin.  In particular, I am concerned about diphenhydramine and the over-the-counter bronchial medication that she is taking having increased side effects including hallucinations and delusions.  3.  Daughter is in agreement with a referral for social work/care coordination.  Daughter would be excepting of home care and/or public health nursing assessment.  She states her mother is otherwise resistant to coming to the clinic for lab work or in person visit.    Subjective   Virginia is a 89 year old who presents for the following health issues behavior concerns.     Start time: 4:22 PM      EMMANUEL Virginia Leone is a 89 year old female is assessed through her daughter. Daughter lives with patient. Daughter is worried about her behavior. This behavior started over a month ago.  She has time when she doesn't sleep, has had hallucinations and delusions.  She is seeing people in her room at night.  She hears singing and guitar playing at night.  She gets angry when others say they don't see the same things. She has episodes of being angry.    She is refusing to come to the clinic.  Worried that doctors are going to collapse her lungs.  ( had pleural effusions.)     She's been  since 2015. Daughter moved in with her then.  Patient doesn't go outside.  Watches [employer] all day.    She never throws out any mail.  Has a large stack on the floor and then uses this to cover her chair at night to keep the cat out of her chair.    She also saves paper plates.      Patient hasn't had an appointment in clinic for 2 years.      Current Outpatient Medications   Medication     aspirin 81 MG tablet     losartan (COZAAR) 50 MG tablet     metoprolol tartrate (LOPRESSOR) 25 MG tablet     omeprazole (PRILOSEC) 20 MG DR capsule     venlafaxine (EFFEXOR-XR) 75 MG 24 hr capsule     fluticasone (FLONASE) 50 MCG/ACT nasal spray     umeclidinium (INCRUSE ELLIPTA) 62.5 MCG/INH inhaler     No current facility-administered medications for this visit.      Daughter started giving her vitamins from [facility] - vitamin D, zinc and multivitamin.    She's also been taking an over the counter bronchial medication.  Daughter also knows that she has Advil PM that she keeps in her room.    Daughter does her cares at home:  Bathing, nail care, meals         Review of Systems   Coughing spells   Occasional twitching/jumping spells      Objective           Vitals:  No vitals were obtained today due to virtual visit.    Physical Exam           Phone call duration: 34 minutes

## 2021-03-29 NOTE — PATIENT INSTRUCTIONS
Have Mom stop all over the counter medication except for aspirin.  I am worried that some of these medications are making her behaviors worse.      Lab orders are in if Mom agrees to come in for those.    I have a referral in to a care coordinator and we will see about a home care nurse or public health nurse.  It is in your Mom's best interest and safety to have an exam done as she hasn't seen a doctor for years.

## 2021-03-29 NOTE — NURSING NOTE
"Chief Complaint   Patient presents with     Personal     abnormal behavior     Spoke with patients daughter Mickie who verified patients last name and date of birth. Daughter reports patient has been unsteady on her feet and for a long time she has been paranoid, confused and seems to be getting worse. Patient is unable to hear over the phone and daughter Mickie will be doing the talking.     Initial There were no vitals taken for this visit. Estimated body mass index is 22.36 kg/m  as calculated from the following:    Height as of 2/27/19: 1.6 m (5' 3\").    Weight as of 2/27/19: 57.2 kg (126 lb 3.2 oz).         Medication Reconciliation: Complete      Lynda Davila LPN   "

## 2021-04-06 ENCOUNTER — PATIENT OUTREACH (OUTPATIENT)
Dept: INTERNAL MEDICINE | Facility: OTHER | Age: 86
End: 2021-04-06

## 2021-04-06 RX ORDER — FAMOTIDINE 20 MG
1000 TABLET ORAL EVERY EVENING
COMMUNITY
End: 2023-04-10

## 2021-04-06 RX ORDER — PHENOL 1.4 %
1 AEROSOL, SPRAY (ML) MUCOUS MEMBRANE EVERY EVENING
COMMUNITY
End: 2023-04-10

## 2021-04-06 RX ORDER — ZINC GLUCONATE 50 MG
50 TABLET ORAL EVERY EVENING
COMMUNITY

## 2021-04-06 RX ORDER — LANOLIN ALCOHOL/MO/W.PET/CERES
3 CREAM (GRAM) TOPICAL
COMMUNITY

## 2021-04-06 NOTE — TELEPHONE ENCOUNTER
"Clinic Care Coordination Contact    Follow Up Progress Note      Assessment:    Per daughter, Mickie, who is caregiver, patient has been better. No hallucinations since stopping the Benedryl, Tylenol PM and a \"memory powder\" that was mixed in water. Medications reconciled. Patient is actually sleeping better, no sundowning.    Now the only problem is that patient does not want to come into clinic for lab draw. Daughter says they would only want someone to come out to draw labs this once, orders placed in chart. They are not looking for a home care nurse or ongoing services. Daughter did get sad talking about the loss of her dad who she cared for and of her marriage around the same time. Denies need for connection to services or help.     Intervention/Education provided during outreach:   Explained what care coordination can do. Challenge to find services that are for one lab draw only.     Plan:   CC will check into possibility of one-time lab draw at home with home care nurse.   Care Coordinator will follow up in 2 days.   Martina Suresh RN on 4/6/2021 at 1:51 PM    "

## 2021-04-07 NOTE — TELEPHONE ENCOUNTER
"Patient's daughter, Mickie, could get her out for \"a drive\" on a nice day to have curbside lab draw if possible. To leadership team to work out process to facilitate this request.  Martina Suresh RN on 4/7/2021 at 3:50 PM    "

## 2021-04-29 ENCOUNTER — PATIENT OUTREACH (OUTPATIENT)
Dept: CARE COORDINATION | Facility: CLINIC | Age: 86
End: 2021-04-29

## 2021-04-29 NOTE — PROGRESS NOTES
Clinic Care Coordination Contact  [facility]/Voicemail       Clinical Data: Care Coordinator Outreach  Outreach attempted x 2.   No voicemail on either line   Plan: Care Coordinator will not attempt again.   Martina Suresh RN on 4/29/2021 at 12:54 PM

## 2021-08-27 DIAGNOSIS — K21.9 GASTROESOPHAGEAL REFLUX DISEASE WITHOUT ESOPHAGITIS: ICD-10-CM

## 2021-08-27 DIAGNOSIS — I10 ESSENTIAL HYPERTENSION: ICD-10-CM

## 2021-08-27 DIAGNOSIS — F41.8 DEPRESSION WITH ANXIETY: ICD-10-CM

## 2021-08-30 RX ORDER — VENLAFAXINE HYDROCHLORIDE 75 MG/1
CAPSULE, EXTENDED RELEASE ORAL
Qty: 90 CAPSULE | Refills: 3 | Status: SHIPPED | OUTPATIENT
Start: 2021-08-30 | End: 2022-09-07

## 2021-08-30 RX ORDER — LOSARTAN POTASSIUM 50 MG/1
TABLET ORAL
Qty: 90 TABLET | Refills: 3 | Status: SHIPPED | OUTPATIENT
Start: 2021-08-30 | End: 2022-09-07

## 2021-08-30 NOTE — TELEPHONE ENCOUNTER
Mick JAMESON  sent Rx request for the following:     Requested Prescriptions   Pending Prescriptions Disp Refills     losartan (COZAAR) 50 MG tablet [Pharmacy Med Name: LOSARTAN 50MG TABLETS] 90 tablet 3     Sig: TAKE 1 TABLET(50 MG) BY MOUTH DAILY     Last Prescription Date:   9/2/2020  Last Fill Qty/Refills:         90, R-3    Last Office Visit:              3/29/2021   Future Office visit:           none    Routing refill request to provider for review/approval because:      Angiotensin-II Receptors Failed - 8/27/2021  6:56 PM        Failed - Last blood pressure under 140/90 in past 12 months     BP Readings from Last 3 Encounters:   02/27/19 170/76   06/15/18 163/73   11/03/17 142/80                 Failed - Normal serum creatinine on file in past 12 months     Recent Labs   Lab Test 02/27/19  1509   CR 0.99       Ok to refill medication if creatinine is low          Failed - Normal serum potassium on file in past 12 months     Recent Labs   Lab Test 02/27/19  1509   POTASSIUM 4.2                        omeprazole (PRILOSEC) 20 MG DR capsule [Pharmacy Med Name: OMEPRAZOLE 20MG CAPSULES] 90 capsule 3     Sig: TAKE 1 CAPSULE(20 MG) BY MOUTH DAILY       Last Prescription Date:   9/2/2020  Last Fill Qty/Refills:         90, R-3        Routing refill request to provider for review/approval because:      PPI Protocol Failed - 8/27/2021  6:56 PM        Failed - No diagnosis of osteoporosis on record              venlafaxine (EFFEXOR-XR) 75 MG 24 hr capsule [Pharmacy Med Name: VENLAFAXINE ER 75MG CAPSULES] 90 capsule 3     Sig: TAKE 1 CAPSULE(75 MG) BY MOUTH DAILY      Last Prescription Date:   9/2/2020  Last Fill Qty/Refills:         90, R-3      Routing refill request to provider for review/approval because:     Serotonin-Norepinephrine Reuptake Inhibitors  Failed - 8/27/2021  6:56 PM        Failed - Blood pressure under 140/90 in past 12 months     BP Readings from Last 3 Encounters:   02/27/19 170/76   06/15/18  163/73   11/03/17 142/80                 Failed - PHQ-9 score of less than 5 in past 6 months     Please review last PHQ-9 score.           Failed - Normal serum creatinine on file in past 12 months     Recent Labs   Lab Test 02/27/19  1509   CR 0.99       Ok to refill medication if creatinine is low       Unable to complete prescription refill per RN Medication Refill Policy.................... Trice Wills RN ....................  8/30/2021   1:17 PM

## 2021-09-03 NOTE — TELEPHONE ENCOUNTER
Patient notified due to establish care with a provider.   Annelise Dash LPN ..........9/3/2021 11:42 AM

## 2021-09-16 DIAGNOSIS — I10 ESSENTIAL HYPERTENSION: ICD-10-CM

## 2021-09-21 NOTE — TELEPHONE ENCOUNTER
Mick JAMESON  sent Rx request for the following:     Requested Prescriptions   Pending Prescriptions Disp Refills     metoprolol tartrate (LOPRESSOR) 25 MG tablet [Pharmacy Med Name: METOPROLOL TARTRATE 25MG TABLETS] 180 tablet 3     Sig: TAKE 1 TABLET(25 MG) BY MOUTH TWICE DAILY     Last Prescription Date:   9/2/2020  Last Fill Qty/Refills:         180, R-3    Last Office Visit:              3/29/2021  Future Office visit:           none    Routing refill request to provider for review/approval because:      Beta-Blockers Protocol Failed - 9/16/2021  3:06 PM        Failed - Blood pressure under 140/90 in past 12 months     BP Readings from Last 3 Encounters:   02/27/19 170/76   06/15/18 163/73   11/03/17 142/80              Unable to complete prescription refill per RN Medication Refill Policy.................... Trice Wills RN ....................  9/21/2021   8:41 AM

## 2021-09-22 RX ORDER — METOPROLOL TARTRATE 25 MG/1
TABLET, FILM COATED ORAL
Qty: 180 TABLET | Refills: 3 | Status: SHIPPED | OUTPATIENT
Start: 2021-09-22 | End: 2022-09-07

## 2021-12-21 NOTE — PROGRESS NOTES
Patient Information     Patient Name  Virginia Leone MRN  [id number] Sex  Female   10/24/1929      Letter by Sanjeev Jasso MD at      Author:  Sanjeev Jasso MD Service:  (none) Author Type:  (none)    Filed:   Encounter Date:  11/3/2017 Status:  (Other)       Virginia Leone   [facility] [id number]    11/3/2017      Dear Ms. Leone,      We've gotten results back from the laboratory for the samples you gave in clinic.  Things look great! Contact us with any questions.    I'm sending along your actual numbers so that you will have them for your general interest and your records.       Take Care,   Sanjeev Jasso MD      Resulted Orders      BASIC METABOLIC PANEL (Collected: 11/3/2017 11:32 AM)      Result  Value Ref Range    SODIUM 136 133 - 143 mmol/L    POTASSIUM 3.8 3.5 - 5.1 mmol/L    CHLORIDE 106 98 - 107 mmol/L    CO2,TOTAL 24 21 - 31 mmol/L    ANION GAP 6 5 - 18                    GLUCOSE 101 70 - 105 mg/dL    CALCIUM 9.2 8.6 - 10.3 mg/dL    BUN 19 7 - 25 mg/dL    CREATININE 0.94 0.70 - 1.30 mg/dL    BUN/CREAT RATIO           20                    GFR if African American >60 >60 ml/min/1.73m2    GFR if not  56 (L) >60 ml/min/1.73m2              18 20

## 2022-08-15 DIAGNOSIS — K21.9 GASTROESOPHAGEAL REFLUX DISEASE WITHOUT ESOPHAGITIS: ICD-10-CM

## 2022-08-15 NOTE — TELEPHONE ENCOUNTER
[facility] sent Rx request for the following:      Requested Prescriptions   Pending Prescriptions Disp Refills     omeprazole (PRILOSEC) 20 MG DR capsule [Pharmacy Med Name: OMEPRAZOLE 20MG CAPSULES] 90 capsule 3     Sig: TAKE 1 CAPSULE(20 MG) BY MOUTH DAILY       PPI Protocol Failed - 8/15/2022  3:20 PM        Failed - No diagnosis of osteoporosis on record        Failed - Recent (12 mo) or future (30 days) visit within the authorizing provider's specialty     Last Prescription Date:   8/30/21  Last Fill Qty/Refills:         90, R-3    Last Office Visit:              3/29/21   Future Office visit:           None    Pt due for annual exam. Routing to provider for refill consideration. Routing to  OUTREACH APPT REQUESTS pool, to assist Pt in scheduling appointment.     Martina Hatfield RN .............. 8/15/2022  3:22 PM

## 2022-08-16 NOTE — TELEPHONE ENCOUNTER
States patient will not come to an appt, she doesn't even want to go outside.     Shelbi Byers on 8/16/2022 at 2:14 PM

## 2022-08-16 NOTE — TELEPHONE ENCOUNTER
[facility] [place] sent Rx request for the following:    Patient is requesting 90 day supply of the following:      omeprazole (PRILOSEC) 20 MG DR capsule 30 capsule 0 8/15/2022  No   Sig: TAKE 1 CAPSULE(20 MG) BY MOUTH DAILY   Sent to pharmacy as: Omeprazole 20 MG Oral Capsule Delayed Release (priLOSEC)   Class: E-Prescribe   Notes to Pharmacy: Needs follow up appointment     Per Dr. Kraft's note to pharmacy, Pt needs follow up appointment. Refused, with note to pharmacy. Martina Hatfield RN .............. 8/16/2022  9:17 AM

## 2022-09-07 ENCOUNTER — TELEPHONE (OUTPATIENT)
Dept: FAMILY MEDICINE | Facility: OTHER | Age: 87
End: 2022-09-07

## 2022-09-07 DIAGNOSIS — F41.8 DEPRESSION WITH ANXIETY: ICD-10-CM

## 2022-09-07 DIAGNOSIS — I10 ESSENTIAL HYPERTENSION: ICD-10-CM

## 2022-09-07 RX ORDER — VENLAFAXINE HYDROCHLORIDE 75 MG/1
CAPSULE, EXTENDED RELEASE ORAL
Qty: 90 CAPSULE | Refills: 0 | Status: SHIPPED | OUTPATIENT
Start: 2022-09-07 | End: 2022-11-08

## 2022-09-07 RX ORDER — METOPROLOL TARTRATE 25 MG/1
25 TABLET, FILM COATED ORAL 2 TIMES DAILY
Qty: 180 TABLET | Refills: 0 | Status: SHIPPED | OUTPATIENT
Start: 2022-09-07 | End: 2022-11-08

## 2022-09-07 RX ORDER — LOSARTAN POTASSIUM 50 MG/1
50 TABLET ORAL DAILY
Qty: 90 TABLET | Refills: 0 | Status: SHIPPED | OUTPATIENT
Start: 2022-09-07 | End: 2022-11-08

## 2022-09-07 NOTE — TELEPHONE ENCOUNTER
Attempted to contact Mickie. It goes straight to voicemail and states mailbox has not been set up.     Christelle Street CMA on 9/7/2022 at 4:11 PM

## 2022-09-07 NOTE — TELEPHONE ENCOUNTER
Attempted to contact Mickie again. Phone just kept ringing and ringing. No voicemail available.     Christelle Street, BINH on 9/7/2022 at 4:27 PM

## 2022-09-07 NOTE — TELEPHONE ENCOUNTER
Mickie states she is not sure if patient will allow anyone else in the home but could try. She states patient is very stubborn. Writer informed her lab work and vital signs need to be checked- medications cannot keep being refilled without it. Mickie stated to go ahead and refer to home care to see if they will go out patient's home.     Christelle Street, CMA on 9/7/2022 at 2:46 PM

## 2022-09-07 NOTE — TELEPHONE ENCOUNTER
Will she allow others in her home?  I could have home care come see her and do labs and get VS.  Pamella Kraft MD

## 2022-09-07 NOTE — TELEPHONE ENCOUNTER
It looks like right now this is set up as a phone visit, for Medicare approval of home care and needs to be a video visit.  Pamella Kraft MD

## 2022-09-07 NOTE — TELEPHONE ENCOUNTER
Call daughter:  1.  Preference would be for in person visit.  2.  Video visit with referral to home care is second option.    Pamella Kraft MD

## 2022-09-07 NOTE — TELEPHONE ENCOUNTER
Patients daughter called-her mom is 92 and hasn't been out of house in years-they scheduled a phone visit for Oct. But she only has a few pills left-needs refills [facility]

## 2022-09-07 NOTE — TELEPHONE ENCOUNTER
----- Message from Karly Lew sent at 9/7/2022  3:22 PM CDT -----  Hi. If you are looking for short-term home care services, a video visit would work for the F2F. Her insurance will not cover long-term services of any kind.     What services are they looking for?  If long-term support services are being requested, [facility]  could connect with them.     Hope that helps,   Karly       ----- Message -----  From: Pamella Hu MD  Sent: 9/7/2022   3:17 PM CDT  To: Karly Brandt,    Hoping you can give me some advice regarding this patient.  I have never met her in person.  Last year we did refills based on a virtual visit.  Daughter is stating patient won't leave her home at all.    Does a video visit suffice for a face-to-face for home care?  Any other thoughts you have are greatly appreciated.    Pamella Kraft MD

## 2022-09-07 NOTE — TELEPHONE ENCOUNTER
Patient's daughter states patient has not been outside the house since her  passed away years ago. She won't even go outside on the deck. She stays inside and will not leave so patient refuses to come in clinic for an appointment. She has a telephone visit scheduled 10/14/22. Needs refills on Venlafaxine, Losartan, and Metoprolol. Orders pending for 90 day supply with no refills.     Christelle Street CMA on 9/7/2022 at 10:39 AM

## 2022-09-08 NOTE — TELEPHONE ENCOUNTER
Mickie REY contacted and informed that in order for patient to qualify and have insurance cover home care services patient needs a face-to-face visit either in person or by video visit. Mickie states that patient refuses and will not leave the house and they do not have any way of doing a video visit. Writer informed Mickie Reed cannot just keep refilling her medications without lab work being checked and vitals being taken. Mickie states patient[place]. She will try to have him talk to patient into coming in for an appointment. Otherwise she states patient will just need to stop all her medications.     Christelle Street, BINH on 9/8/2022 at 12:01 PM

## 2022-09-08 NOTE — TELEPHONE ENCOUNTER
Attempted to contact Mickie. It goes straight to voicemail and states mailbox has not been set up.     Christelle Street CMA on 9/8/2022 at 8:37 AM

## 2022-10-14 DIAGNOSIS — K21.9 GASTROESOPHAGEAL REFLUX DISEASE WITHOUT ESOPHAGITIS: ICD-10-CM

## 2022-10-17 DIAGNOSIS — K21.9 GASTROESOPHAGEAL REFLUX DISEASE WITHOUT ESOPHAGITIS: ICD-10-CM

## 2022-10-17 NOTE — TELEPHONE ENCOUNTER
"  Last Prescription Date: 8/15/22  Last Qty/Refills: 30 / 0  Last Office Visit: 3/29/21  Future Office Visit: 11/8/22     Requested Prescriptions   Pending Prescriptions Disp Refills     omeprazole (PRILOSEC) 20 MG DR capsule [Pharmacy Med Name: OMEPRAZOLE 20MG CAPSULES] 30 capsule 0     Sig: TAKE 1 CAPSULE(20 MG) BY MOUTH DAILY       PPI Protocol Failed - 10/14/2022 12:52 PM        Failed - No diagnosis of osteoporosis on record        Passed - Not on Clopidogrel (unless Pantoprazole ordered)        Passed - Recent (12 mo) or future (30 days) visit within the authorizing provider's specialty     Patient has had an office visit with the authorizing provider or a provider within the authorizing providers department within the previous 12 mos or has a future within next 30 days. See \"Patient Info\" tab in inbasket, or \"Choose Columns\" in Meds & Orders section of the refill encounter.              Passed - Medication is active on med list        Passed - Patient is age 18 or older        Passed - No active pregnacy on record        Passed - No positive pregnancy test in past 12 months             "

## 2022-10-18 NOTE — TELEPHONE ENCOUNTER
omeprazole (PRILOSEC) 20 MG DR capsule 30 capsule 0 10/17/2022  No   Sig: TAKE 1 CAPSULE(20 MG) BY MOUTH DAILY     Refilled to Mick JAMESON requesting.  Aida Barker RN on 10/18/2022 at 11:14 AM

## 2022-10-31 ENCOUNTER — TELEPHONE (OUTPATIENT)
Dept: FAMILY MEDICINE | Facility: OTHER | Age: 87
End: 2022-10-31

## 2022-10-31 NOTE — TELEPHONE ENCOUNTER
Attempted to contact Mickie. No answer or voicemail.     Omeprazole last filled 10/17/2022 for 30 capsules- was sent to [facility] in [place]    Metoprolol last filled 9/7/2022 for 180 tablets- should not need a refill until December     Christelle Street CMA on 10/31/2022 at 10:58 AM

## 2022-10-31 NOTE — TELEPHONE ENCOUNTER
Please call the patients daughter.  The patient needs medications.    She is out of RX - Omeprazole  and RX - Metropolol  She is almost out.  She does not have an appointment until 11/08/2022.        Pharmacy is [facility] on 10/31/2022 at 9:47 AM

## 2022-11-08 ENCOUNTER — VIRTUAL VISIT (OUTPATIENT)
Dept: FAMILY MEDICINE | Facility: OTHER | Age: 87
End: 2022-11-08
Attending: FAMILY MEDICINE
Payer: MEDICARE

## 2022-11-08 DIAGNOSIS — I10 ESSENTIAL HYPERTENSION: ICD-10-CM

## 2022-11-08 DIAGNOSIS — J44.9 CHRONIC OBSTRUCTIVE PULMONARY DISEASE, UNSPECIFIED COPD TYPE (H): ICD-10-CM

## 2022-11-08 DIAGNOSIS — F41.8 DEPRESSION WITH ANXIETY: ICD-10-CM

## 2022-11-08 DIAGNOSIS — N18.31 STAGE 3A CHRONIC KIDNEY DISEASE (H): ICD-10-CM

## 2022-11-08 DIAGNOSIS — K21.9 GASTROESOPHAGEAL REFLUX DISEASE WITHOUT ESOPHAGITIS: ICD-10-CM

## 2022-11-08 DIAGNOSIS — F03.918 SENILE DEMENTIA, WITH BEHAVIORAL DISTURBANCE (H): Primary | ICD-10-CM

## 2022-11-08 PROCEDURE — 99443 PR PHYSICIAN TELEPHONE EVALUATION 21-30 MIN: CPT | Mod: 93 | Performed by: FAMILY MEDICINE

## 2022-11-08 RX ORDER — METOPROLOL TARTRATE 25 MG/1
25 TABLET, FILM COATED ORAL 2 TIMES DAILY
Qty: 180 TABLET | Refills: 3 | Status: SHIPPED | OUTPATIENT
Start: 2022-11-08

## 2022-11-08 RX ORDER — LOSARTAN POTASSIUM 50 MG/1
50 TABLET ORAL DAILY
Qty: 90 TABLET | Refills: 3 | Status: SHIPPED | OUTPATIENT
Start: 2022-11-08 | End: 2023-04-13

## 2022-11-08 RX ORDER — VENLAFAXINE HYDROCHLORIDE 75 MG/1
CAPSULE, EXTENDED RELEASE ORAL
Qty: 90 CAPSULE | Refills: 3 | Status: SHIPPED | OUTPATIENT
Start: 2022-11-08 | End: 2024-08-12

## 2022-11-08 NOTE — PROGRESS NOTES
Virginia is a 89 year old who is being evaluated via a billable telephone visit.      What phone number would you like to be contacted at? [phone]  How would you like to obtain your AVS? Mail a copy    Assessment & Plan       ICD-10-CM    1. Senile dementia, with behavioral disturbance  F03.918       2. Essential hypertension  I10 losartan (COZAAR) 50 MG tablet     metoprolol tartrate (LOPRESSOR) 25 MG tablet      3. Gastroesophageal reflux disease without esophagitis  K21.9 omeprazole (PRILOSEC) 20 MG DR capsule      4. Depression with anxiety  F41.8 venlafaxine (EFFEXOR XR) 75 MG 24 hr capsule      5. Stage 3a chronic kidney disease (H)  N18.31       6. Chronic obstructive pulmonary disease, unspecified COPD type (H)  J44.9         1.  Discussed with daughter that labs and check of BP are recommended.  She does think mom would allow a community paramedic to make a home visit.  I discussed this patient with home care and services needed would not be covered by home care.  2.  Recommend that health care directive be completed.  3.  No changes in medications at this time.        Ordering of each unique test  Prescription drug management  25 minutes spent on the date of the encounter doing chart review, patient visit, documentation, discussion with other provider(s) and discussion with family         No follow-ups on file.    ZBIGNIEW PARHAM MD  [facility]    Subjective   Virginia is a 89 year old, presenting for the following health issues:  Recheck Medication  Daughter Mickie helps to facilitate the appointment     HPI   Virginia Leone is a 89 year old female is assessed via phone visit.    Start:  3:32 PM     Spoke some with patient's daughter and patient didn't wish to continue on the phone.    New concern:  Bilateral foot redness - goes up over her ankles.  She denies pain at rest.  She has had RLS symptoms and has had bilateral bunion surgery.    She pretty much leaves her feet  "down all the time.    Patient with hypertension, CKD, heartburn/reflux and COPD.  Daughter states that her mother has been losing wt over the past few years.      Patient hasn't left her home in years.  She didn't go to her son's .  Daughter has offered to take her for rides, sit outside with her and she declines.  Daughter ewa brings her groceries and supplies.          Current Outpatient Medications   Medication     aspirin 81 MG tablet     losartan (COZAAR) 50 MG tablet     melatonin 3 MG tablet     metoprolol tartrate (LOPRESSOR) 25 MG tablet     Multiple Vitamins-Minerals (MULTIVITAMIN ADULTS 50+) TABS     omeprazole (PRILOSEC) 20 MG DR capsule     venlafaxine (EFFEXOR XR) 75 MG 24 hr capsule     Vitamin D, Cholecalciferol, 25 MCG (1000 UT) CAPS     zinc gluconate 50 MG tablet     No current facility-administered medications for this visit.         Past Medical History:   Diagnosis Date     Actinic keratosis     nose     Gastritis without bleeding     treated , and again 08     Mass of breast     left breast cyst drained     Mass of right breast     No Comments Provided     Phlebitis of superficial vein of left lower extremity     No Comments Provided     Umbilical hernia without obstruction or gangrene     No Comments Provided         Review of Systems   Refuses to use cane/walker - uses furniture and walls to get around.      Patient has been diagnosed with COPD   - has refused to use inhalers      Daughter states mom's memory has gone way downhill      Patient's cat d at age 18 this week.            Objective    Vitals - Patient Reported  Weight (Patient Reported): 54.4 kg (120 lb)  Height (Patient Reported): 160 cm (5' 3\")  BMI (Based on Pt Reported Ht/Wt): 21.26  Pain Score: No Pain (0)        Physical Exam   Patient is very hard of hearing     RESP: No cough, no audible wheezing, able to talk in full sentences  Remainder of exam unable to be completed due to telephone visits      "     END:  3:55 PM   Phone call duration: 23 minutes

## 2022-11-08 NOTE — NURSING NOTE
"Chief Complaint   Patient presents with     Recheck Medication     Patient needing refills on medications     Initial There were no vitals taken for this visit. Estimated body mass index is 22.36 kg/m  as calculated from the following:    Height as of 2/27/19: 1.6 m (5' 3\").    Weight as of 2/27/19: 57.2 kg (126 lb 3.2 oz).  Medication Reconciliation: complete    Christelle Street CMA       FOOD SECURITY SCREENING QUESTIONS:    The next two questions are to help us understand your food security.  If you are feeling you need any assistance in this area, we have resources available to support you today.    Hunger Vital Signs:  Within the past 12 months we worried whether our food would run out before we got money to buy more. Never  Within the past 12 months the food we bought just didn't last and we didn't have money to get more. Never  Christelle Street CMA,LPN on 11/8/2022 at 3:07 PM      "

## 2022-11-14 ENCOUNTER — TELEPHONE (OUTPATIENT)
Dept: FAMILY MEDICINE | Facility: OTHER | Age: 87
End: 2022-11-14

## 2022-11-14 DIAGNOSIS — F03.918 SENILE DEMENTIA, WITH BEHAVIORAL DISTURBANCE (H): Primary | ICD-10-CM

## 2022-11-14 NOTE — TELEPHONE ENCOUNTER
Kellen states she will need a demographic sheet, current medication list, and exact order with what services the provider wants and frequency along with diagnosis code (how often to go out to check vital signs). Fax to her at [phone].     I printed off demographic sheet and medication list. Referral to community paramedic program pending. Please fill in red hard stops and then sign.     Christelle Street CMA on 11/14/2022 at 11:27 AM

## 2022-11-14 NOTE — TELEPHONE ENCOUNTER
----- Message from Pamella Reed MD sent at 11/12/2022  7:39 PM CST -----  Community paramedic program

## 2022-11-14 NOTE — TELEPHONE ENCOUNTER
Please call Kellen at the community paramedic program:  community paramedic program referral - Med, Kellen Rodney, Cell [phone].        She can talk to patient's daughter Luh:  [phone] (Home Phone)    [phone] (Mobile)  Patient diagnosis is dementia with agoraphobia - hasn't left her home for years.  If she could get some VS and assist patient and daughter in completing/updating her advance care documents.   Ask if they draw labs at all.

## 2022-11-22 ENCOUNTER — LAB (OUTPATIENT)
Dept: LAB | Facility: OTHER | Age: 87
End: 2022-11-22
Attending: FAMILY MEDICINE
Payer: MEDICARE

## 2022-11-22 DIAGNOSIS — I10 ESSENTIAL HYPERTENSION: ICD-10-CM

## 2022-11-22 DIAGNOSIS — E87.6 HYPOKALEMIA: ICD-10-CM

## 2022-11-22 DIAGNOSIS — D64.9 ANEMIA, UNSPECIFIED TYPE: Primary | ICD-10-CM

## 2022-11-22 LAB
ALBUMIN SERPL BCG-MCNC: 3.9 G/DL (ref 3.5–5.2)
ALP SERPL-CCNC: 118 U/L (ref 35–104)
ALT SERPL W P-5'-P-CCNC: 18 U/L (ref 10–35)
ANION GAP SERPL CALCULATED.3IONS-SCNC: 11 MMOL/L (ref 7–15)
AST SERPL W P-5'-P-CCNC: 23 U/L (ref 10–35)
BILIRUB SERPL-MCNC: 0.2 MG/DL
BUN SERPL-MCNC: 22.1 MG/DL (ref 8–23)
CALCIUM SERPL-MCNC: 8.9 MG/DL (ref 8.2–9.6)
CHLORIDE SERPL-SCNC: 103 MMOL/L (ref 98–107)
CREAT SERPL-MCNC: 1.12 MG/DL (ref 0.51–0.95)
DEPRECATED HCO3 PLAS-SCNC: 27 MMOL/L (ref 22–29)
ERYTHROCYTE [DISTWIDTH] IN BLOOD BY AUTOMATED COUNT: 15.1 % (ref 10–15)
FERRITIN SERPL-MCNC: 151 NG/ML (ref 11–328)
GFR SERPL CREATININE-BSD FRML MDRD: 46 ML/MIN/1.73M2
GLUCOSE SERPL-MCNC: 103 MG/DL (ref 70–99)
HCT VFR BLD AUTO: 31.9 % (ref 35–47)
HGB BLD-MCNC: 10.3 G/DL (ref 11.7–15.7)
HOLD SPECIMEN: NORMAL
HOLD SPECIMEN: NORMAL
IRON SERPL-MCNC: 36 UG/DL (ref 37–145)
MCH RBC QN AUTO: 31.6 PG (ref 26.5–33)
MCHC RBC AUTO-ENTMCNC: 32.3 G/DL (ref 31.5–36.5)
MCV RBC AUTO: 98 FL (ref 78–100)
PLATELET # BLD AUTO: 301 10E3/UL (ref 150–450)
POTASSIUM SERPL-SCNC: 3.1 MMOL/L (ref 3.4–5.3)
PROT SERPL-MCNC: 6.7 G/DL (ref 6.4–8.3)
RBC # BLD AUTO: 3.26 10E6/UL (ref 3.8–5.2)
SODIUM SERPL-SCNC: 141 MMOL/L (ref 136–145)
VIT B12 SERPL-MCNC: 405 PG/ML (ref 232–1245)
WBC # BLD AUTO: 6.6 10E3/UL (ref 4–11)

## 2022-11-22 PROCEDURE — 82607 VITAMIN B-12: CPT | Mod: ZL

## 2022-11-22 PROCEDURE — 80053 COMPREHEN METABOLIC PANEL: CPT | Mod: ZL

## 2022-11-22 PROCEDURE — 83540 ASSAY OF IRON: CPT | Mod: ZL

## 2022-11-22 PROCEDURE — 85027 COMPLETE CBC AUTOMATED: CPT | Mod: ZL

## 2022-11-22 PROCEDURE — 82728 ASSAY OF FERRITIN: CPT | Mod: ZL

## 2022-11-22 RX ORDER — POTASSIUM CHLORIDE 750 MG/1
10 TABLET, EXTENDED RELEASE ORAL DAILY
Qty: 90 TABLET | Refills: 3 | Status: SHIPPED | OUTPATIENT
Start: 2022-11-22 | End: 2024-08-12

## 2022-11-23 DIAGNOSIS — D50.9 IRON DEFICIENCY ANEMIA, UNSPECIFIED IRON DEFICIENCY ANEMIA TYPE: Primary | ICD-10-CM

## 2022-11-23 RX ORDER — FERROUS GLUCONATE 324(38)MG
324 TABLET ORAL EVERY OTHER DAY
Qty: 45 TABLET | Refills: 3 | Status: SHIPPED | OUTPATIENT
Start: 2022-11-23 | End: 2023-07-13

## 2023-02-14 ENCOUNTER — HOSPITAL ENCOUNTER (EMERGENCY)
Facility: OTHER | Age: 88
Discharge: ANOTHER HOSPITAL | End: 2023-02-14
Attending: FAMILY MEDICINE | Admitting: FAMILY MEDICINE
Payer: MEDICARE

## 2023-02-14 ENCOUNTER — HOSPITAL ENCOUNTER (INPATIENT)
Facility: HOSPITAL | Age: 88
LOS: 7 days | Discharge: NURSING, REHAB OR LONG-TERM CARE | DRG: 690 | End: 2023-02-21
Attending: INTERNAL MEDICINE | Admitting: INTERNAL MEDICINE
Payer: MEDICARE

## 2023-02-14 VITALS
DIASTOLIC BLOOD PRESSURE: 97 MMHG | TEMPERATURE: 99.3 F | HEART RATE: 107 BPM | SYSTOLIC BLOOD PRESSURE: 195 MMHG | RESPIRATION RATE: 18 BRPM | BODY MASS INDEX: 18.6 KG/M2 | OXYGEN SATURATION: 97 % | WEIGHT: 105 LBS

## 2023-02-14 DIAGNOSIS — F02.80 ALZHEIMER'S DEMENTIA, UNSPECIFIED DEMENTIA SEVERITY, UNSPECIFIED TIMING OF DEMENTIA ONSET, UNSPECIFIED WHETHER BEHAVIORAL, PSYCHOTIC, OR MOOD DISTURBANCE OR ANXIETY (H): Primary | ICD-10-CM

## 2023-02-14 DIAGNOSIS — N30.00 ACUTE CYSTITIS WITHOUT HEMATURIA: ICD-10-CM

## 2023-02-14 DIAGNOSIS — N39.0 ACUTE UTI: ICD-10-CM

## 2023-02-14 DIAGNOSIS — R35.0 URINE FREQUENCY: ICD-10-CM

## 2023-02-14 DIAGNOSIS — G30.9 ALZHEIMER'S DEMENTIA, UNSPECIFIED DEMENTIA SEVERITY, UNSPECIFIED TIMING OF DEMENTIA ONSET, UNSPECIFIED WHETHER BEHAVIORAL, PSYCHOTIC, OR MOOD DISTURBANCE OR ANXIETY (H): Primary | ICD-10-CM

## 2023-02-14 PROBLEM — N18.30 CHRONIC KIDNEY DISEASE, STAGE 3 (H): Status: ACTIVE | Noted: 2021-03-29

## 2023-02-14 LAB
ALBUMIN UR-MCNC: 30 MG/DL
ANION GAP SERPL CALCULATED.3IONS-SCNC: 16 MMOL/L (ref 7–15)
APPEARANCE UR: CLEAR
BACTERIA #/AREA URNS HPF: ABNORMAL /HPF
BASOPHILS # BLD AUTO: 0.1 10E3/UL (ref 0–0.2)
BASOPHILS NFR BLD AUTO: 1 %
BILIRUB UR QL STRIP: NEGATIVE
BUN SERPL-MCNC: 22.4 MG/DL (ref 8–23)
CALCIUM SERPL-MCNC: 8.9 MG/DL (ref 8.2–9.6)
CHLORIDE SERPL-SCNC: 98 MMOL/L (ref 98–107)
COLOR UR AUTO: ABNORMAL
CREAT SERPL-MCNC: 0.95 MG/DL (ref 0.51–0.95)
CREAT SERPL-MCNC: 1 MG/DL (ref 0.51–0.95)
DEPRECATED HCO3 PLAS-SCNC: 25 MMOL/L (ref 22–29)
EOSINOPHIL # BLD AUTO: 0 10E3/UL (ref 0–0.7)
EOSINOPHIL NFR BLD AUTO: 0 %
ERYTHROCYTE [DISTWIDTH] IN BLOOD BY AUTOMATED COUNT: 15.4 % (ref 10–15)
GFR SERPL CREATININE-BSD FRML MDRD: 52 ML/MIN/1.73M2
GFR SERPL CREATININE-BSD FRML MDRD: 56 ML/MIN/1.73M2
GLUCOSE SERPL-MCNC: 106 MG/DL (ref 70–99)
GLUCOSE UR STRIP-MCNC: NEGATIVE MG/DL
HCT VFR BLD AUTO: 38.2 % (ref 35–47)
HGB BLD-MCNC: 12.9 G/DL (ref 11.7–15.7)
HGB UR QL STRIP: ABNORMAL
HOLD SPECIMEN: NORMAL
IMM GRANULOCYTES # BLD: 0.1 10E3/UL
IMM GRANULOCYTES NFR BLD: 1 %
KETONES UR STRIP-MCNC: 40 MG/DL
LACTATE SERPL-SCNC: 1.4 MMOL/L (ref 0.7–2)
LACTATE SERPL-SCNC: 2 MMOL/L (ref 0.7–2)
LEUKOCYTE ESTERASE UR QL STRIP: ABNORMAL
LYMPHOCYTES # BLD AUTO: 1.2 10E3/UL (ref 0.8–5.3)
LYMPHOCYTES NFR BLD AUTO: 10 %
MCH RBC QN AUTO: 31.5 PG (ref 26.5–33)
MCHC RBC AUTO-ENTMCNC: 33.8 G/DL (ref 31.5–36.5)
MCV RBC AUTO: 93 FL (ref 78–100)
MONOCYTES # BLD AUTO: 1.2 10E3/UL (ref 0–1.3)
MONOCYTES NFR BLD AUTO: 10 %
MUCOUS THREADS #/AREA URNS LPF: PRESENT /LPF
NEUTROPHILS # BLD AUTO: 9.3 10E3/UL (ref 1.6–8.3)
NEUTROPHILS NFR BLD AUTO: 78 %
NITRATE UR QL: NEGATIVE
NRBC # BLD AUTO: 0 10E3/UL
NRBC BLD AUTO-RTO: 0 /100
PH UR STRIP: 6 [PH] (ref 5–9)
PLATELET # BLD AUTO: 364 10E3/UL (ref 150–450)
POTASSIUM SERPL-SCNC: 3.2 MMOL/L (ref 3.4–5.3)
RBC # BLD AUTO: 4.09 10E6/UL (ref 3.8–5.2)
RBC URINE: 1 /HPF
SODIUM SERPL-SCNC: 139 MMOL/L (ref 136–145)
SP GR UR STRIP: 1.01 (ref 1–1.03)
SQUAMOUS EPITHELIAL: 3 /HPF
UROBILINOGEN UR STRIP-MCNC: NORMAL MG/DL
WBC # BLD AUTO: 11.8 10E3/UL (ref 4–11)
WBC URINE: 15 /HPF

## 2023-02-14 PROCEDURE — 36415 COLL VENOUS BLD VENIPUNCTURE: CPT | Performed by: INTERNAL MEDICINE

## 2023-02-14 PROCEDURE — 120N000001 HC R&B MED SURG/OB

## 2023-02-14 PROCEDURE — 36415 COLL VENOUS BLD VENIPUNCTURE: CPT | Performed by: FAMILY MEDICINE

## 2023-02-14 PROCEDURE — 85025 COMPLETE CBC W/AUTO DIFF WBC: CPT | Performed by: FAMILY MEDICINE

## 2023-02-14 PROCEDURE — 83605 ASSAY OF LACTIC ACID: CPT | Performed by: FAMILY MEDICINE

## 2023-02-14 PROCEDURE — 82565 ASSAY OF CREATININE: CPT | Performed by: INTERNAL MEDICINE

## 2023-02-14 PROCEDURE — 87040 BLOOD CULTURE FOR BACTERIA: CPT | Performed by: FAMILY MEDICINE

## 2023-02-14 PROCEDURE — 96374 THER/PROPH/DIAG INJ IV PUSH: CPT | Performed by: FAMILY MEDICINE

## 2023-02-14 PROCEDURE — 250N000013 HC RX MED GY IP 250 OP 250 PS 637: Performed by: INTERNAL MEDICINE

## 2023-02-14 PROCEDURE — 250N000011 HC RX IP 250 OP 636: Performed by: INTERNAL MEDICINE

## 2023-02-14 PROCEDURE — 83605 ASSAY OF LACTIC ACID: CPT | Performed by: INTERNAL MEDICINE

## 2023-02-14 PROCEDURE — 99284 EMERGENCY DEPT VISIT MOD MDM: CPT | Performed by: FAMILY MEDICINE

## 2023-02-14 PROCEDURE — 999N000157 HC STATISTIC RCP TIME EA 10 MIN

## 2023-02-14 PROCEDURE — 258N000003 HC RX IP 258 OP 636: Performed by: INTERNAL MEDICINE

## 2023-02-14 PROCEDURE — 87086 URINE CULTURE/COLONY COUNT: CPT | Performed by: FAMILY MEDICINE

## 2023-02-14 PROCEDURE — 99222 1ST HOSP IP/OBS MODERATE 55: CPT | Mod: AI | Performed by: INTERNAL MEDICINE

## 2023-02-14 PROCEDURE — 81001 URINALYSIS AUTO W/SCOPE: CPT | Performed by: FAMILY MEDICINE

## 2023-02-14 PROCEDURE — 250N000011 HC RX IP 250 OP 636: Performed by: FAMILY MEDICINE

## 2023-02-14 PROCEDURE — 99285 EMERGENCY DEPT VISIT HI MDM: CPT | Mod: 25 | Performed by: FAMILY MEDICINE

## 2023-02-14 PROCEDURE — 80048 BASIC METABOLIC PNL TOTAL CA: CPT | Performed by: FAMILY MEDICINE

## 2023-02-14 PROCEDURE — 258N000003 HC RX IP 258 OP 636: Performed by: FAMILY MEDICINE

## 2023-02-14 RX ORDER — LIDOCAINE 40 MG/G
CREAM TOPICAL
Status: DISCONTINUED | OUTPATIENT
Start: 2023-02-14 | End: 2023-02-20

## 2023-02-14 RX ORDER — ACETAMINOPHEN 325 MG/1
650 TABLET ORAL EVERY 6 HOURS PRN
Status: DISCONTINUED | OUTPATIENT
Start: 2023-02-14 | End: 2023-02-21 | Stop reason: HOSPADM

## 2023-02-14 RX ORDER — AMOXICILLIN 250 MG
2 CAPSULE ORAL 2 TIMES DAILY PRN
Status: DISCONTINUED | OUTPATIENT
Start: 2023-02-14 | End: 2023-02-21 | Stop reason: HOSPADM

## 2023-02-14 RX ORDER — POTASSIUM CHLORIDE 750 MG/1
10 TABLET, EXTENDED RELEASE ORAL DAILY
Status: DISCONTINUED | OUTPATIENT
Start: 2023-02-14 | End: 2023-02-14

## 2023-02-14 RX ORDER — PANTOPRAZOLE SODIUM 40 MG/1
40 TABLET, DELAYED RELEASE ORAL
Status: DISCONTINUED | OUTPATIENT
Start: 2023-02-15 | End: 2023-02-21 | Stop reason: HOSPADM

## 2023-02-14 RX ORDER — POLYETHYLENE GLYCOL 3350 17 G/17G
17 POWDER, FOR SOLUTION ORAL DAILY PRN
Status: DISCONTINUED | OUTPATIENT
Start: 2023-02-14 | End: 2023-02-21 | Stop reason: HOSPADM

## 2023-02-14 RX ORDER — CEFTRIAXONE SODIUM 1 G/50ML
1 INJECTION, SOLUTION INTRAVENOUS EVERY 24 HOURS
Status: DISCONTINUED | OUTPATIENT
Start: 2023-02-15 | End: 2023-02-20

## 2023-02-14 RX ORDER — BISACODYL 10 MG
10 SUPPOSITORY, RECTAL RECTAL DAILY PRN
Status: DISCONTINUED | OUTPATIENT
Start: 2023-02-14 | End: 2023-02-21 | Stop reason: HOSPADM

## 2023-02-14 RX ORDER — POTASSIUM CHLORIDE 1500 MG/1
20 TABLET, EXTENDED RELEASE ORAL ONCE
Status: COMPLETED | OUTPATIENT
Start: 2023-02-14 | End: 2023-02-14

## 2023-02-14 RX ORDER — AMLODIPINE BESYLATE 5 MG/1
5 TABLET ORAL DAILY
Status: DISCONTINUED | OUTPATIENT
Start: 2023-02-14 | End: 2023-02-19

## 2023-02-14 RX ORDER — SODIUM CHLORIDE 9 MG/ML
INJECTION, SOLUTION INTRAVENOUS CONTINUOUS
Status: ACTIVE | OUTPATIENT
Start: 2023-02-14 | End: 2023-02-15

## 2023-02-14 RX ORDER — AMOXICILLIN 250 MG
1 CAPSULE ORAL 2 TIMES DAILY PRN
Status: DISCONTINUED | OUTPATIENT
Start: 2023-02-14 | End: 2023-02-21 | Stop reason: HOSPADM

## 2023-02-14 RX ORDER — ONDANSETRON 2 MG/ML
4 INJECTION INTRAMUSCULAR; INTRAVENOUS EVERY 6 HOURS PRN
Status: DISCONTINUED | OUTPATIENT
Start: 2023-02-14 | End: 2023-02-21 | Stop reason: HOSPADM

## 2023-02-14 RX ORDER — ENOXAPARIN SODIUM 100 MG/ML
30 INJECTION SUBCUTANEOUS EVERY 24 HOURS
Status: DISCONTINUED | OUTPATIENT
Start: 2023-02-14 | End: 2023-02-21 | Stop reason: HOSPADM

## 2023-02-14 RX ORDER — ACETAMINOPHEN 650 MG/1
650 SUPPOSITORY RECTAL EVERY 6 HOURS PRN
Status: DISCONTINUED | OUTPATIENT
Start: 2023-02-14 | End: 2023-02-21 | Stop reason: HOSPADM

## 2023-02-14 RX ORDER — ASPIRIN 81 MG/1
81 TABLET, CHEWABLE ORAL DAILY
Status: DISCONTINUED | OUTPATIENT
Start: 2023-02-14 | End: 2023-02-21 | Stop reason: HOSPADM

## 2023-02-14 RX ORDER — ONDANSETRON 4 MG/1
4 TABLET, ORALLY DISINTEGRATING ORAL EVERY 6 HOURS PRN
Status: DISCONTINUED | OUTPATIENT
Start: 2023-02-14 | End: 2023-02-21 | Stop reason: HOSPADM

## 2023-02-14 RX ORDER — ALPRAZOLAM 0.25 MG
0.5 TABLET ORAL ONCE
Status: COMPLETED | OUTPATIENT
Start: 2023-02-14 | End: 2023-02-14

## 2023-02-14 RX ORDER — METOPROLOL TARTRATE 25 MG/1
25 TABLET, FILM COATED ORAL 2 TIMES DAILY
Status: DISCONTINUED | OUTPATIENT
Start: 2023-02-14 | End: 2023-02-21 | Stop reason: HOSPADM

## 2023-02-14 RX ORDER — LABETALOL 20 MG/4 ML (5 MG/ML) INTRAVENOUS SYRINGE
20 EVERY 6 HOURS PRN
Status: DISCONTINUED | OUTPATIENT
Start: 2023-02-14 | End: 2023-02-20

## 2023-02-14 RX ORDER — SODIUM CHLORIDE 9 MG/ML
INJECTION, SOLUTION INTRAVENOUS CONTINUOUS
Status: DISCONTINUED | OUTPATIENT
Start: 2023-02-14 | End: 2023-02-14 | Stop reason: HOSPADM

## 2023-02-14 RX ORDER — CEFTRIAXONE SODIUM 1 G/50ML
1 INJECTION, SOLUTION INTRAVENOUS ONCE
Status: COMPLETED | OUTPATIENT
Start: 2023-02-14 | End: 2023-02-14

## 2023-02-14 RX ADMIN — CEFTRIAXONE SODIUM 1 G: 1 INJECTION, SOLUTION INTRAVENOUS at 15:35

## 2023-02-14 RX ADMIN — ENOXAPARIN SODIUM 30 MG: 30 INJECTION SUBCUTANEOUS at 20:10

## 2023-02-14 RX ADMIN — METOPROLOL TARTRATE 25 MG: 25 TABLET, FILM COATED ORAL at 20:09

## 2023-02-14 RX ADMIN — ASPIRIN 81 MG 81 MG: 81 TABLET ORAL at 20:10

## 2023-02-14 RX ADMIN — SODIUM CHLORIDE: 9 INJECTION, SOLUTION INTRAVENOUS at 20:11

## 2023-02-14 RX ADMIN — ALPRAZOLAM 0.5 MG: 0.25 TABLET ORAL at 20:10

## 2023-02-14 RX ADMIN — SODIUM CHLORIDE 1000 ML: 9 INJECTION, SOLUTION INTRAVENOUS at 13:58

## 2023-02-14 RX ADMIN — ACETAMINOPHEN 650 MG: 325 TABLET, FILM COATED ORAL at 20:10

## 2023-02-14 RX ADMIN — POTASSIUM CHLORIDE 20 MEQ: 1500 TABLET, EXTENDED RELEASE ORAL at 20:09

## 2023-02-14 RX ADMIN — AMLODIPINE BESYLATE 5 MG: 5 TABLET ORAL at 20:09

## 2023-02-14 ASSESSMENT — ACTIVITIES OF DAILY LIVING (ADL)
CONCENTRATING,_REMEMBERING_OR_MAKING_DECISIONS_DIFFICULTY: YES
PATIENT'S_PREFERRED_MEANS_OF_COMMUNICATION: ENGLISH SPEAKER WITH HEARING LOSS, NO SPEECH PROBLEMS.
DRESSING/BATHING: BATHING DIFFICULTY, DEPENDENT;DRESSING DIFFICULTY, DEPENDENT
ADLS_ACUITY_SCORE: 49
ADLS_ACUITY_SCORE: 61
WALKING_OR_CLIMBING_STAIRS: TRANSFERRING DIFFICULTY, ASSISTANCE 1 PERSON;STAIR CLIMBING DIFFICULTY, ASSISTANCE 1 PERSON;AMBULATION DIFFICULTY, ASSISTANCE 1 PERSON
EATING/SWALLOWING: OTHER (SEE COMMENTS)
WERE_AUXILIARY_AIDS_OFFERED?: NO
ADLS_ACUITY_SCORE: 35
DOING_ERRANDS_INDEPENDENTLY_DIFFICULTY: YES
DRESSING/BATHING_DIFFICULTY: YES
THE_FOLLOWING_AIDS_WERE_PROVIDED;: PATIENT DECLINED OFFER OF AUXILIARY AIDS
DESCRIBE_HEARING_LOSS: BILATERAL HEARING LOSS
HEARING_DIFFICULTY_OR_DEAF: YES
WALKING_OR_CLIMBING_STAIRS_DIFFICULTY: YES
TOILETING_ISSUES: YES
ADLS_ACUITY_SCORE: 35
DIFFICULTY_COMMUNICATING: YES
DIFFICULTY_EATING/SWALLOWING: YES
WEAR_GLASSES_OR_BLIND: NO
TOILETING_ASSISTANCE: TOILETING DIFFICULTY, DEPENDENT
FALL_HISTORY_WITHIN_LAST_SIX_MONTHS: NO

## 2023-02-14 NOTE — ED PROVIDER NOTES
History     Chief Complaint   Patient presents with     Altered Mental Status     Diarrhea     HPI  Virginia Leone is a 89 year old female who arrives from home via EMS with increasing confusion, loose stools.  Unable to get out of bed last couple of days.  Patient lives at home with her daughter.    Review nurses notes below  Pt arrives via EMS with c/o increased confusion and diarrhea for the last couple days and not getting out of bed.   Allergies:  Allergies   Allergen Reactions     Penicillins Hives     Latex Rash       Problem List:    Patient Active Problem List    Diagnosis Date Noted     Chronic kidney disease, stage 3 (H) 03/29/2021     Priority: Medium     Actinic keratosis 01/29/2018     Priority: Medium     Overview:   Nose       Dermatitis, atopic 01/29/2018     Priority: Medium     Umbilical hernia 01/29/2018     Priority: Medium     Gastroesophageal reflux disease, esophagitis presence not specified 01/29/2018     Priority: Medium     IMO Regulatory Load OCT 2020       Hypercholesterolemia 01/29/2018     Priority: Medium     Hypertension 01/29/2018     Priority: Medium     Cramp of limb 01/29/2018     Priority: Medium     Osteoarthrosis 01/29/2018     Priority: Medium     Overview:   Cervical and lumbar spine       Urinary incontinence, urge 01/29/2018     Priority: Medium     Hypertensive emergency 10/25/2013     Priority: Medium     Gastroenteritis 04/02/2012     Priority: Medium     Hypokalemia 04/02/2012     Priority: Medium     Arthropathy 05/19/2011     Priority: Medium     Female stress incontinence 11/19/2010     Priority: Medium     Osteoporosis 11/19/2010     Priority: Medium        Past Medical History:    Past Medical History:   Diagnosis Date     Actinic keratosis      Gastritis without bleeding      Mass of breast      Mass of right breast      Phlebitis of superficial vein of left lower extremity      Umbilical hernia without obstruction or gangrene        Past Surgical History:     Past Surgical History:   Procedure Laterality Date     BUNIONECTOMY      ,right 1st metatarsal head and hammer toe      SECTION      X 2     ENDOSCOPY UPPER, COLONOSCOPY, COMBINED      ,positive for H. pylori and diverticulosis     ESOPHAGOSCOPY, GASTROSCOPY, DUODENOSCOPY (EGD), COMBINED      , moderate gastritis       Family History:    Family History   Problem Relation Age of Onset     Heart Disease Mother         Heart Disease,CAD     Depression Daughter      Hypertension Daughter      Bipolar Disorder Daughter      Developmental delay Son         developmentally delayed adult     Seizure Disorder Son        Social History:  Marital Status:   [5]  Social History     Tobacco Use     Smoking status: Never     Smokeless tobacco: Never   Vaping Use     Vaping Use: Never used   Substance Use Topics     Alcohol use: No     Drug use: No        Medications:    aspirin 81 MG tablet  ferrous gluconate (FERGON) 324 (38 Fe) MG tablet  losartan (COZAAR) 50 MG tablet  melatonin 3 MG tablet  metoprolol tartrate (LOPRESSOR) 25 MG tablet  Multiple Vitamins-Minerals (MULTIVITAMIN ADULTS 50+) TABS  omeprazole (PRILOSEC) 20 MG DR capsule  potassium chloride ER (KLOR-CON M) 10 MEQ CR tablet  venlafaxine (EFFEXOR XR) 75 MG 24 hr capsule  Vitamin D, Cholecalciferol, 25 MCG (1000 UT) CAPS  zinc gluconate 50 MG tablet          Review of Systems   Unable to perform ROS: Dementia       Physical Exam   Pulse: 104  Temp: 99.3  F (37.4  C)  Resp: 18  Weight: 47.6 kg (105 lb)  SpO2: 98 %      Physical Exam  Vitals and nursing note reviewed.   Constitutional:       Appearance: She is ill-appearing.   Pulmonary:      Effort: Pulmonary effort is normal.   Abdominal:      General: There is no distension.      Palpations: Abdomen is soft.      Tenderness: There is no abdominal tenderness.         ED Course            Results for orders placed or performed during the hospital encounter of 23 (from the past 24  hour(s))   CBC with platelets differential    Narrative    The following orders were created for panel order CBC with platelets differential.  Procedure                               Abnormality         Status                     ---------                               -----------         ------                     CBC with platelets and d...[[id number]]  Abnormal            Final result                 Please view results for these tests on the individual orders.   Basic metabolic panel   Result Value Ref Range    Sodium 139 136 - 145 mmol/L    Potassium 3.2 (L) 3.4 - 5.3 mmol/L    Chloride 98 98 - 107 mmol/L    Carbon Dioxide (CO2) 25 22 - 29 mmol/L    Anion Gap 16 (H) 7 - 15 mmol/L    Urea Nitrogen 22.4 8.0 - 23.0 mg/dL    Creatinine 1.00 (H) 0.51 - 0.95 mg/dL    Calcium 8.9 8.2 - 9.6 mg/dL    Glucose 106 (H) 70 - 99 mg/dL    GFR Estimate 52 (L) >60 mL/min/1.73m2   CBC with platelets and differential   Result Value Ref Range    WBC Count 11.8 (H) 4.0 - 11.0 10e3/uL    RBC Count 4.09 3.80 - 5.20 10e6/uL    Hemoglobin 12.9 11.7 - 15.7 g/dL    Hematocrit 38.2 35.0 - 47.0 %    MCV 93 78 - 100 fL    MCH 31.5 26.5 - 33.0 pg    MCHC 33.8 31.5 - 36.5 g/dL    RDW 15.4 (H) 10.0 - 15.0 %    Platelet Count 364 150 - 450 10e3/uL    % Neutrophils 78 %    % Lymphocytes 10 %    % Monocytes 10 %    % Eosinophils 0 %    % Basophils 1 %    % Immature Granulocytes 1 %    NRBCs per 100 WBC 0 <1 /100    Absolute Neutrophils 9.3 (H) 1.6 - 8.3 10e3/uL    Absolute Lymphocytes 1.2 0.8 - 5.3 10e3/uL    Absolute Monocytes 1.2 0.0 - 1.3 10e3/uL    Absolute Eosinophils 0.0 0.0 - 0.7 10e3/uL    Absolute Basophils 0.1 0.0 - 0.2 10e3/uL    Absolute Immature Granulocytes 0.1 <=0.4 10e3/uL    Absolute NRBCs 0.0 10e3/uL   Extra Tube    Narrative    The following orders were created for panel order Extra Tube.  Procedure                               Abnormality         Status                     ---------                               -----------          ------                     Extra Blue Top Tube[[id number]]                              Final result               Extra Red Top Tube[[id number]]                               Final result               Extra Green Top (Lithium...[[id number]]                      Final result                 Please view results for these tests on the individual orders.   Extra Blue Top Tube   Result Value Ref Range    Hold Specimen x    Extra Red Top Tube   Result Value Ref Range    Hold Specimen x    Extra Green Top (Lithium Heparin) ON ICE   Result Value Ref Range    Hold Specimen x    Lactic acid whole blood   Result Value Ref Range    Lactic Acid 2.0 0.7 - 2.0 mmol/L   UA with Microscopic reflex to Culture    Specimen: Urine, Catheter   Result Value Ref Range    Color Urine Light Yellow Colorless, Straw, Light Yellow, Yellow    Appearance Urine Clear Clear    Glucose Urine Negative Negative mg/dL    Bilirubin Urine Negative Negative    Ketones Urine 40 (A) Negative mg/dL    Specific Gravity Urine 1.015 1.000 - 1.030    Blood Urine Small (A) Negative    pH Urine 6.0 5.0 - 9.0    Protein Albumin Urine 30 (A) Negative mg/dL    Urobilinogen Urine Normal Normal, 2.0 mg/dL    Nitrite Urine Negative Negative    Leukocyte Esterase Urine Moderate (A) Negative    Bacteria Urine Few (A) None Seen /HPF    Mucus Urine Present (A) None Seen /LPF    RBC Urine 1 <=2 /HPF    WBC Urine 15 (H) <=5 /HPF    Squamous Epithelials Urine 3 (H) <=1 /HPF    Narrative    Urine Culture ordered based on laboratory criteria       Medications   0.9% sodium chloride BOLUS (1,000 mLs Intravenous New Bag 2/14/23 1358)     Followed by   sodium chloride 0.9% infusion (has no administration in time range)   cefTRIAXone in d5w (ROCEPHIN) intermittent infusion 1 g (1 g Intravenous New Bag 2/14/23 5045)       Assessments & Plan (with Medical Decision Making)     I have reviewed the nursing notes.    I have reviewed the findings, diagnosis, plan and need for follow up  with the patient.     Urinalysis positive, lactate and blood cultures pending.  Patient mostly bedridden and lives at home, not safe to discharge home at this point.    3:50 PM I did speak with house supervisor in [place] as [facility] is currently on divert.  They thought that they might have a bed to accommodate this patient.  I did call to speak with their hospitalist.  They were not available I did leave a message.  4:26 PM--I did speak with Dr. Reeves, hospitalist at [facility] who graciously accepted patient for admission.  Patient is hemodynamically stable for ground transport.  Blood cultures pending lactate reassuring, antibiotics x1 in the ED.    New Prescriptions    No medications on file       Final diagnoses:   Acute UTI       2/14/2023   [facility]     Rogerio Teague MD  02/14/23 2410       Rogerio Teague MD  02/14/23 3746

## 2023-02-14 NOTE — LETTER
[facility]  [address]  [place] [address] [phone]    FACSIMILE TRANSMITTAL SHEET    TO: Kyle Gray   COMPANY: [employer]  FAX NUMBER: [phone]   PHONE NUMBER: [phone]     FROM: Tammie ELIZA Espinoza  PHONE: [phone]  DATE: 02/21/23      _____URGENT _____REVIEW ONLY _____PLEASE COMMENT____PLEASE REPLY    NOTES/COMMENTS: Discharge orders                                       IF YOU DID NOT RECEIVE THE CORRECT NUMBER OF PAGES OR THE FAX DID NOT COME THROUGH CLEARLY, PLEASE CALL THE SENDER     CONFIDENTIALITY STATEMENT: Confidential information that may accompany this transmission contains protected health information under state and federal law and is legally privileged. This information is intended only for the use of the individual or entity named above and may be used only for carrying out treatment, payment or other healthcare operations. The recipient or person responsible for delivering this information is prohibited by law from disclosing this information without proper authorization to any other party, unless required to do so by law or regulation. If you are not the intended recipient, you are hereby notified that any review, dissemination, distribution, or copying of this message is strictly prohibited. If you have received this communication in error, please destroy the materials and contact us immediately by calling the number listed above. No response indicates that the information was received by the appropriate authorized party

## 2023-02-14 NOTE — ED NOTES
Updated pt's daughter to pt condition and transfer to [place]. Daughter was provided nursing station phone number and room number.

## 2023-02-14 NOTE — ED TRIAGE NOTES
Pt arrives via EMS with c/o increased confusion and diarrhea for the last couple days and not getting out of bed.      Triage Assessment     Row Name 02/14/23 1318       Triage Assessment (Adult)    Airway WDL WDL       Respiratory WDL    Respiratory WDL WDL       Skin Circulation/Temperature WDL    Skin Circulation/Temperature WDL WDL       Cardiac WDL    Cardiac WDL WDL       Peripheral/Neurovascular WDL    Peripheral Neurovascular WDL WDL       Cognitive/Neuro/Behavioral WDL    Cognitive/Neuro/Behavioral WDL X

## 2023-02-14 NOTE — LETTER
[facility]  [address]  [place] [address] [phone]    FACSIMILE TRANSMITTAL SHEET    TO: [facility]   FAX NUMBER: [phone]  PHONE NUMBER: [phone]       FROM: ELIZA Levy  PHONE: [phone]  DATE: 02/21/23      _____URGENT _____REVIEW ONLY _____PLEASE COMMENT____PLEASE REPLY    NOTES/COMMENTS:                                       IF YOU DID NOT RECEIVE THE CORRECT NUMBER OF PAGES OR THE FAX DID NOT COME THROUGH CLEARLY, PLEASE CALL THE SENDER     CONFIDENTIALITY STATEMENT: Confidential information that may accompany this transmission contains protected health information under state and federal law and is legally privileged. This information is intended only for the use of the individual or entity named above and may be used only for carrying out treatment, payment or other healthcare operations. The recipient or person responsible for delivering this information is prohibited by law from disclosing this information without proper authorization to any other party, unless required to do so by law or regulation. If you are not the intended recipient, you are hereby notified that any review, dissemination, distribution, or copying of this message is strictly prohibited. If you have received this communication in error, please destroy the materials and contact us immediately by calling the number listed above. No response indicates that the information was received by the appropriate authorized party

## 2023-02-15 LAB
ANION GAP SERPL CALCULATED.3IONS-SCNC: 11 MMOL/L (ref 7–15)
BUN SERPL-MCNC: 19.2 MG/DL (ref 8–23)
CALCIUM SERPL-MCNC: 8.2 MG/DL (ref 8.2–9.6)
CHLORIDE SERPL-SCNC: 105 MMOL/L (ref 98–107)
CREAT SERPL-MCNC: 0.83 MG/DL (ref 0.51–0.95)
DEPRECATED HCO3 PLAS-SCNC: 22 MMOL/L (ref 22–29)
ERYTHROCYTE [DISTWIDTH] IN BLOOD BY AUTOMATED COUNT: 15.5 % (ref 10–15)
GFR SERPL CREATININE-BSD FRML MDRD: 65 ML/MIN/1.73M2
GLUCOSE SERPL-MCNC: 77 MG/DL (ref 70–99)
HCT VFR BLD AUTO: 37.2 % (ref 35–47)
HGB BLD-MCNC: 12.1 G/DL (ref 11.7–15.7)
MCH RBC QN AUTO: 31.9 PG (ref 26.5–33)
MCHC RBC AUTO-ENTMCNC: 32.5 G/DL (ref 31.5–36.5)
MCV RBC AUTO: 98 FL (ref 78–100)
PLATELET # BLD AUTO: 295 10E3/UL (ref 150–450)
POTASSIUM SERPL-SCNC: 3.2 MMOL/L (ref 3.4–5.3)
POTASSIUM SERPL-SCNC: 3.6 MMOL/L (ref 3.4–5.3)
RBC # BLD AUTO: 3.79 10E6/UL (ref 3.8–5.2)
SODIUM SERPL-SCNC: 138 MMOL/L (ref 136–145)
WBC # BLD AUTO: 11.1 10E3/UL (ref 4–11)

## 2023-02-15 PROCEDURE — 85027 COMPLETE CBC AUTOMATED: CPT | Performed by: INTERNAL MEDICINE

## 2023-02-15 PROCEDURE — 36415 COLL VENOUS BLD VENIPUNCTURE: CPT | Performed by: INTERNAL MEDICINE

## 2023-02-15 PROCEDURE — 99233 SBSQ HOSP IP/OBS HIGH 50: CPT | Performed by: INTERNAL MEDICINE

## 2023-02-15 PROCEDURE — 120N000001 HC R&B MED SURG/OB

## 2023-02-15 PROCEDURE — 250N000013 HC RX MED GY IP 250 OP 250 PS 637: Performed by: INTERNAL MEDICINE

## 2023-02-15 PROCEDURE — 250N000013 HC RX MED GY IP 250 OP 250 PS 637: Performed by: HOSPITALIST

## 2023-02-15 PROCEDURE — 84132 ASSAY OF SERUM POTASSIUM: CPT | Performed by: INTERNAL MEDICINE

## 2023-02-15 PROCEDURE — 250N000011 HC RX IP 250 OP 636: Performed by: INTERNAL MEDICINE

## 2023-02-15 RX ORDER — NALOXONE HYDROCHLORIDE 0.4 MG/ML
0.4 INJECTION, SOLUTION INTRAMUSCULAR; INTRAVENOUS; SUBCUTANEOUS
Status: DISCONTINUED | OUTPATIENT
Start: 2023-02-15 | End: 2023-02-21 | Stop reason: HOSPADM

## 2023-02-15 RX ORDER — NALOXONE HYDROCHLORIDE 0.4 MG/ML
0.2 INJECTION, SOLUTION INTRAMUSCULAR; INTRAVENOUS; SUBCUTANEOUS
Status: DISCONTINUED | OUTPATIENT
Start: 2023-02-15 | End: 2023-02-21 | Stop reason: HOSPADM

## 2023-02-15 RX ORDER — IBUPROFEN 200 MG
200 TABLET ORAL 2 TIMES DAILY
COMMUNITY
End: 2023-04-10

## 2023-02-15 RX ORDER — TRAMADOL HYDROCHLORIDE 50 MG/1
50 TABLET ORAL EVERY 6 HOURS PRN
Status: DISCONTINUED | OUTPATIENT
Start: 2023-02-15 | End: 2023-02-21 | Stop reason: HOSPADM

## 2023-02-15 RX ORDER — POTASSIUM CHLORIDE 20MEQ/15ML
20 LIQUID (ML) ORAL ONCE
Status: COMPLETED | OUTPATIENT
Start: 2023-02-15 | End: 2023-02-15

## 2023-02-15 RX ADMIN — AMLODIPINE BESYLATE 5 MG: 5 TABLET ORAL at 09:15

## 2023-02-15 RX ADMIN — ACETAMINOPHEN 650 MG: 325 TABLET, FILM COATED ORAL at 12:07

## 2023-02-15 RX ADMIN — Medication 1 MG: at 21:54

## 2023-02-15 RX ADMIN — METOPROLOL TARTRATE 25 MG: 25 TABLET, FILM COATED ORAL at 09:14

## 2023-02-15 RX ADMIN — ASPIRIN 81 MG 81 MG: 81 TABLET ORAL at 09:14

## 2023-02-15 RX ADMIN — POTASSIUM CHLORIDE 20 MEQ: 20 SOLUTION ORAL at 02:51

## 2023-02-15 RX ADMIN — METOPROLOL TARTRATE 25 MG: 25 TABLET, FILM COATED ORAL at 21:43

## 2023-02-15 RX ADMIN — TRAMADOL HYDROCHLORIDE 50 MG: 50 TABLET, COATED ORAL at 16:21

## 2023-02-15 RX ADMIN — PANTOPRAZOLE SODIUM 40 MG: 40 TABLET, DELAYED RELEASE ORAL at 09:15

## 2023-02-15 RX ADMIN — CEFTRIAXONE SODIUM 1 G: 1 INJECTION, SOLUTION INTRAVENOUS at 16:12

## 2023-02-15 RX ADMIN — ENOXAPARIN SODIUM 30 MG: 30 INJECTION SUBCUTANEOUS at 21:44

## 2023-02-15 ASSESSMENT — ACTIVITIES OF DAILY LIVING (ADL)
ADLS_ACUITY_SCORE: 61
ADLS_ACUITY_SCORE: 67
ADLS_ACUITY_SCORE: 63
ADLS_ACUITY_SCORE: 63
ADLS_ACUITY_SCORE: 61
ADLS_ACUITY_SCORE: 63
ADLS_ACUITY_SCORE: 65
ADLS_ACUITY_SCORE: 67

## 2023-02-15 NOTE — H&P
[facility]    History and Physical - Hospitalist Service       Date of Admission: 2.14.2023    Assessment & Plan      Virginia Leone is a 89 year old female admitted on (Not on file). She was transferred from [facility] in [facility] for continuation of the treatment of urinary tract infection, nonhemorrhagic cystitis.  She has history of dementia, hypertension, GERD and iron deficiency.    Hospital problems  1 .  Urinary tract infection  -Present on admission  -Nonhemorrhagic cystitis  -Follow urine cultures obtained in granted task at hospital and continue Rocephin    2 hypertension  -She takes Cozaar at home  - BP not controlled  -Monitor and continueCozaar for now  - Add amlodipine    3.  Hypokalemia  -We will initiate nursing potassium replacement protocol    4.  GERD  -Continue PPIs    5.  Dementia  -History of behavioral problems  -Close monitoring.  -I found no dementia medications on her home medication list.    6. Dehydration  - IVF and encourage PO intake.     7. Tachycardia:   - could be either due to SARS or anxiety or dehydration.   - hydrate  - monitor  - xanax 0.5 mg x 1  Diet:  Advance as tolerated.  We will start with a full liquid  DVT Prophylaxis: Enoxaparin (Lovenox) SQ  Vegas Catheter: Not present  Lines: None     Cardiac Monitoring: None  Code Status:  Full code    Clinically Significant Risk Factors Present on Admission        # Hypokalemia: Lowest K = 3.2 mmol/L in last 2 days, will replace as needed             # Dementia: noted on problem list            Disposition Plan    inpatient care. Expect more than 2 midnights stay       Ranjith Martin MD  Hospitalist Service  [facility]  Securely message with [employer] (more info)  Text page via [facility] Paging/Directory     ______________________________________________________________________    Chief Complaint   Weakness and confusion and diarrhea.    History is obtained from the electronic health record and  "emergency department physician.  Patient is poor historian due to dementia.    History of Present Illness   Virginia Leone is a 89 year old female who lives with her daughter and carries past medical history significant for hypertension, dementia, GERD, iron deficiency, who presents to [facility] with altered mental status and diarrhea.  She was also weak and was not able to get out of bed for a few days.  No fevers, pain, nausea vomiting documented.  Review of system is very limited patient's mental status which is severe dementia.  No family members available to help to obtain review of system. To all my questions she was answering \"please help me\". Oriented x 0  Granted task at hospital emergency room work-up was positive for mild leukocytosis, mild hypokalemia, abnormal UA stable creatinine and lactic acid 2.0.  Treatment provided [facility]'s emergency room: Rocephin was given.  Blood cultures obtained before starting antibiotics.      Past Medical History    Past Medical History:   Diagnosis Date     Actinic keratosis     nose     Gastritis without bleeding     treated , and again 08     Mass of breast     left breast cyst drained     Mass of right breast     No Comments Provided     Phlebitis of superficial vein of left lower extremity     No Comments Provided     Umbilical hernia without obstruction or gangrene     No Comments Provided       Past Surgical History   Past Surgical History:   Procedure Laterality Date     BUNIONECTOMY      ,right 1st metatarsal head and hammer toe      SECTION      X 2     ENDOSCOPY UPPER, COLONOSCOPY, COMBINED      ,positive for H. pylori and diverticulosis     ESOPHAGOSCOPY, GASTROSCOPY, DUODENOSCOPY (EGD), COMBINED      , moderate gastritis       Prior to Admission Medications   Cannot display prior to admission medications because the patient has not been admitted in this contact.        Review of Systems    Try to obtain 10 " points of review of system.  Due to dementia cannot get reliable answers.  No family members available to help.    Physical Exam   Vital Signs:                    Weight: 0 lbs 0 oz    General: She is ill-appearing but not toxic looking. Confused  HEENT: Mucous membranes dry  Neck: No JVD  Cardiac: Regular rate and rhythm no murmurs gallops or rubs, but tachycardic.  Pulmonary: Pulmonary effort is normal.  No wheezes rales or rhonchi.  GI: Abdomen soft nontender nondistended no organomegaly, no pulsatile masses.  : Bladder not palpable.  No CVA tenderness.  Musculoskeletal: Modreate sarcopenia  Skin: trophical skin changes.  Neurologic exam: Nonfocal.  Psychiatric exam: She is confused but not agitated, also anxious    Medical Decision Making       45 MINUTES SPENT BY ME on the date of service doing chart review, history, exam, documentation & further activities per the note.      Data     I have personally reviewed the following data over the past 24 hrs:    11.8 (H)  \   12.9   / 364     139 98 22.4 /  106 (H)   3.2 (L) 25 1.00 (H) \       Procal: N/A CRP: N/A Lactic Acid: 2.0         Imaging results reviewed over the past 24 hrs:   No results found for this or any previous visit (from the past 24 hour(s)).

## 2023-02-15 NOTE — PROGRESS NOTES
"CLINICAL NUTRITION SERVICES  -  ASSESSMENT NOTE    Virginia Leone : Admission Nutrition Risk Screen - unsure of weight loss, reduced intake    89 yof admitted for acute cystitis. Medical hx includes HTN, GERD, dementia. Pt disoriented. Family reports typically oriented at home. Reduced intake for the last few days per family, suspect longer with low bodyweight and weight loss over the last few years. Usual weight was around 120lb. Limited weight hx over the last few years to determine timeframe of weight loss.     Diet Order: Full liquid  Intake: none    Height: 5' 3\"  Weight: 100 lbs 1.42 oz  Body mass index is 17.73 kg/m .  Weight Status:  Underweight BMI <18.5  Weight History:   Wt Readings from Last 10 Encounters:   02/14/23 45.4 kg (100 lb 1.4 oz)   02/14/23 47.6 kg (105 lb)   02/27/19 57.2 kg (126 lb 3.2 oz)   06/15/18 54.4 kg (120 lb)   11/03/17 54.9 kg (121 lb)   07/12/16 57.2 kg (126 lb)   04/22/15 53.1 kg (117 lb)   11/05/14 54.9 kg (121 lb)   09/11/14 55.3 kg (122 lb)   08/19/14 55.3 kg (122 lb)        Weight used to calculate needs: 55.5 kg ibw  Estimated Energy Needs: 3900-4352 kcals (25-30 Kcal/Kg)   Estimated Protein Needs: 55-85 grams protein (1-1.5 g pro/Kg)    MALNUTRITION:  % Weight Loss: unable to assess  % Intake:  Suspected to meet criteria  Muscle and Subcutaneous Fat Loss:  Moderate       Malnutrition Diagnosis: Suspect malnutrition with low bodyweight, loss of muscle and subcutaneous fat. Suspect poor intake.  In Context of:  Chronic illness or disease    NUTRITION DIAGNOSIS:  Malnutrition related to suspected inadequate intake as evidenced by loss of muscle and subcutaneous fat    NUTRITION RECOMMENDATIONS  - Ensure with/between meals  - Encourage frequent meals and snacks    MONITORING AND EVALUATION:  RD will monitor diet order, intake, weight, labs        "

## 2023-02-15 NOTE — PROGRESS NOTES
"[facility]    Hospitalist Progress Note    Date of Service (when I saw the patient): 02/15/2023    Assessment & Plan   Virginia Leone is a 89 year old female who was admitted on 2/14/2023.    Acute cystitis without hematuria: Causing acute encephalopathy/delirium  -Present on admission  -Nonhemorrhagic cystitis  -Follow urine cultures  -Continue Rocephin     Essential hypertension  -She takes Cozaar at home  - BP not controlled  -Monitor and continueCozaar for now  - Add amlodipine     Hypokalemia  -We will initiate nursing potassium replacement protocol     GERD  -Continue PPIs     Dementia: With acute encephalopathy, delirium  -History of behavioral problems  -Close monitoring.  -no dementia medications on her home medication list.     Dehydration  - IVF and encourage PO intake.      Tachycardia:   - could be either due to SARS or anxiety or dehydration.   - hydrate  - monitor  - xanax 0.5 mg x 1      Clinically Significant Risk Factors Present on Admission        # Hypokalemia: Lowest K = 3.2 mmol/L in last 2 days, will replace as needed           # Hypertension: home medication list includes antihypertensive(s)   # Dementia: noted on problem list    # Cachexia: Estimated body mass index is 17.73 kg/m  as calculated from the following:    Height as of this encounter: 1.6 m (5' 3\").    Weight as of this encounter: 45.4 kg (100 lb 1.4 oz).           DVT Prophylaxis: Enoxaparin (Lovenox) SQ    Code Status: No CPR- Do NOT Intubate    Disposition: Expected discharge back to SNF pending improvement.  PT/OT eval, may need placement.    Kenia Olivo MD, MD      Interval History   Patient seen in room.  Found sleeping, arousable but not very interactive.  Mumbles, unintelligible responses currently.  No acute events overnight, no new symptoms.    -Data reviewed today: I reviewed all new labs and imaging results over the last 24 hours. I personally reviewed imaging reports.    Physical Exam   Temp: 98  F " (36.7  C) Temp src: Tympanic BP: 168/82 Pulse: 112   Resp: 16 SpO2: 97 % O2 Device: None (Room air)    Vitals:    02/14/23 1917   Weight: 45.4 kg (100 lb 1.4 oz)     Vital Signs with Ranges  Temp:  [97.8  F (36.6  C)-99.9  F (37.7  C)] 98  F (36.7  C)  Pulse:  [104-112] 112  Resp:  [16-18] 16  BP: (148-200)/() 168/82  SpO2:  [91 %-98 %] 97 %    Intake/Output Summary (Last 24 hours) at 2/15/2023 0759  Last data filed at 2/15/2023 0212  Gross per 24 hour   Intake 500 ml   Output --   Net 500 ml       Peripheral IV 02/14/23 Anterior;Distal;Right Lower forearm (Active)   Site Assessment WDL 02/15/23 0200   Line Status Saline locked 02/15/23 0200   Phlebitis Scale 0-->no symptoms 02/15/23 0200   Infiltration Scale 0 02/15/23 0200   Number of days: 1     No line/device    Constitutional -somnolent, responsive to stimuli but not meaningfully interactive  HEENT - atraumatic, normocephalic  Neck - supple, no masses, no JVD  CVS - S1 S2 RRR, no murmurs, rubs, gallops  Respiratory - CTA b/l  GI - soft, NT, ND, + bowel sounds, no organomegaly  Musculoskeletal - no LE edema, no lesions  Neuro -somnolent, no gross focal motor deficits    Medications     sodium chloride 100 mL/hr at 02/14/23 2011       amLODIPine  5 mg Oral Daily     aspirin  81 mg Oral Daily     cefTRIAXone  1 g Intravenous Q24H     enoxaparin ANTICOAGULANT  30 mg Subcutaneous Q24H     metoprolol tartrate  25 mg Oral BID     pantoprazole  40 mg Oral QAM AC     sodium chloride (PF)  3 mL Intracatheter Q8H       Data   Recent Labs   Lab 02/15/23  0530 02/15/23  0053 02/14/23 1956 02/14/23  1358   WBC 11.1*  --   --  11.8*   HGB 12.1  --   --  12.9   MCV 98  --   --  93     --   --  364     --   --  139   POTASSIUM 3.6 3.2*  --  3.2*   CHLORIDE 105  --   --  98   CO2 22  --   --  25   BUN 19.2  --   --  22.4   CR 0.83  --  0.95 1.00*   ANIONGAP 11  --   --  16*   REYMUNDO 8.2  --   --  8.9   GLC 77  --   --  106*     Lactic Acid   Date Value Ref Range  Status   02/14/2023 1.4 0.7 - 2.0 mmol/L Final   02/14/2023 2.0 0.7 - 2.0 mmol/L Final       No results found for this or any previous visit (from the past 24 hour(s)).    Kenia Olivo MD

## 2023-02-15 NOTE — PLAN OF CARE
Goal Outcome Evaluation: Face to face report given with opportunity to observe patient.    Report given to Gely Ribeiro RN   2/15/2023  3:51 PM

## 2023-02-15 NOTE — PROVIDER NOTIFICATION
Pt c/o increased pain, unable to rate but continues to call out and is difficult to console. Family also very anxious about pain management. Provider notified, PRN ultram ordered and given see MAR.

## 2023-02-15 NOTE — PHARMACY
Pharmacy Antimicrobial Stewardship Assessment     Current Antimicrobial Therapy:  Anti-infectives (From now, onward)    Start     Dose/Rate Route Frequency Ordered Stop    02/15/23 1600  cefTRIAXone in d5w (ROCEPHIN) intermittent infusion 1 g         1 g  over 30 Minutes Intravenous EVERY 24 HOURS 23            Indication: UTI    Days of Therapy: 2     Pertinent Labs:    Recent labs: (last 7 days)     23  1358 23  1403 23  1956 02/15/23  0530   WBC 11.8*  --   --  11.1*   LACT  --  2.0 1.4  --        Temperature:  Temp (24hrs), Av.5  F (36.9  C), Min:97.7  F (36.5  C), Max:99.9  F (37.7  C)      Culture Results:       Recommendations/Interventions:  1. None at this time.    Lorena Fontanez, [facility]  February 15, 2023

## 2023-02-15 NOTE — PLAN OF CARE
Goal Outcome Evaluation:  Patient is very hard of hearing and staff does need to speak loudly in her ear for communicating, patients family was here and they did make their concerns known. Patient has been turned and repositioned accordingly, is too weak to get out of bed with less than 3 staff, patient unable to stand or even take a step independently, staff did get patient up and she was able to pivot with heavy assist of three, the vitals were elevated this morning, BP was 212/66 HR oral medications were administered crushed and in applesauce, a short time later a manual blood pressure was done, and her BP was 181/90, patient was administered tylenol per her  family request, vitals as charted.

## 2023-02-15 NOTE — PLAN OF CARE
"Goal Outcome Evaluation:patient was reporting that she had to use the bathroom, this writer along with two additional staff did get patient up on the commode with transfer belt and pivot, patient was a very difficult transfer and the three of us staff did use all of our strength to pivot from the bed to the commode, family was in the room at the time and they stated it was \"odd\" and that patient \"usually able to transfer and walk independently at home\" this was not the case here in the hospital.                         "

## 2023-02-15 NOTE — PLAN OF CARE
[facility] Inpatient Admission Note:    Patient admitted to 3218/3218-1 at approximately 1917 via cart accompanied by transport tech from [facility] . Report received from Dennis CRAVEN, who received handoff from MERISSA Marie in SBAR format at 1900 via face to face in room. Patient transferred to bed via slide board.. Patient is alert and oriented X 0, denies pain; rates at 5 on 0-10 scale.  Patient oriented to room, unit, hourly rounding, and plan of care. Explained admission packet and patient handbook with patient bill of rights brochure. Will continue to monitor and document as needed.     Inpatient Nursing criteria listed below was met:    Health care directives status obtained and documented: Yes     If initial lactic acid greater than 2.0, repeat lactic acid drawn within one hour of arrival to unit: Yes.    Clergy visit ordered if patient requests: N/A    Skin issues/needs documented: Yes    Isolation Patient: no Education given, correct sign in place and documentation row added to PCS:  No    Fall Prevention Yes: Care plan updated, education given and documented, sticker and magnet in place: Yes and No    Care Plan initiated: Yes    Education Documented (including assessment): Yes    Patient has discharge needs : Yes If yes, please explain:may need SNF

## 2023-02-15 NOTE — PROGRESS NOTES
Assessment completed by phone conversation with Mickie burns.  Mickie is also Virginia's primary caregiver     LOC: alert, disoriented.  Per Mickie, Virginia is typically alert and oriented     Dx: cystitis   Chronic Disease Management: COPD, HTN, GERD, CKDIII    Lives with:  Mickie Burns   Living at:  home  Transportation: YES Family provides     Primary PCP: Pamella Hu  Insurance:  Medicare and [employer] Medicare IMM letter reviewed with Mickie burns.    Support System:  Family   Homecare/PCA: not connected   /County Services:   Not connected   : NO      How was the [facility] notification completed: n/a    Health Care Directive: on file- sonKrystian   Guardian: no  POA: Lyudmila burns     Pharmacy: [facility]   Meds management: Mickie manages     Adequate Resources for needs (housing, utilities, food/med): YES  Household chores: Mickie burns manages  Work/community/social activity: NO homebody per Mickie     ADLs: typically independent, washes up at the sink   Ambulation:furniture walks, has a cane and walker but doesn't utilize   Falls: yes- multiple   Nutrition: intake has been limited for the last few days   Sleep: no concerns     Equipment used: ramp      Oxygen supplier: no      Does the supplier have valid oxygen orders:  n/a    Mental health: depression noted in chart- no concerns voiced   Substance abuse: no   Exposure to violence/abuse: no concerns voiced/identified  Stressors: no concerns voiced/identified      Able to Return to Prior Living Arrangements: to be determined.  Mickie hopes that Virginia improve enough to return home.  She also doesn't think that Virginia would be open to going to a SNF.  Receptive to home care.      Choice of Vendor: to be determined     Barriers: to be determined     NORMA: low     Plan: goal is for Virginia to return home but likely will need short term rehab.

## 2023-02-15 NOTE — CARE PLAN
Prior to Admission Medication Reconciliation:     Medications added:   [] None  [x] As listed below:    ibu- has been taking BID     Medications deleted:   [x] None  [] As listed below:    Medications marked for review/removal by attending:  [x] None  [] As listed below:    Changes made to existing medications:   [] None  [x] Updated time of day, strengths and frequencies to most current.     Updated time of day pt takes supplements    Pertinent notes/medications patient takes different than prescribed:     Pt has not taken medications for at least 4 days.     Daughter reports she has a bottle of azo for incontinence in her room but she is unsure if she has been taking it or not.     Last times/dates taken verified with patient:  [x] Yes- completed myself   [] Nurse completed, no changes made (double checked entries)  [] Unable to review with patient at this time:    Allergy review:    [x]Did not review: reviewed by nursing  []Allergies reviewed and updated if needed.     Medication reconciliation sources:   []Patient  [x]Patient family member/emergency contact: daughterMickie manages  [x][facility] Report Review  [x]Epic Chart Review  [x]Care Everywhere review  []Pharmacy med list/phone call: **  []Fill dates reflect compliancy. No concerns.  []Fill dates do not reflect compliancy on the following medications:   [x]Outside meds dispense report:   [x]Fill dates reflect compliancy. No concerns.  []Fill dates do not reflect compliancy on the following medications:   []HomeCare medlist, Nursing home or Assisted Living MAR:  []Behavioral Health Provider:  []Other: **    Pharmacy desired at discharge: Mick     Is patient on coumadin?   [x]No  []Yes      Fill dates and reported compliancy:  [x] Compliant: fill dates reflect reported compliancy.   [] Not applicable. Patient is not taking any prescribed maintenance medications at this time.   [] Fill dates do not coincide with compliancy, but reports compliancy.    []  Fill dates reflect compliancy, but reports non-compliancy.   [] Cannot assess at this time.     Historian accuracy:  [] Excellent- alert and oriented, understands why meds were prescribed and how to take, able to answer specifics  [] Good- alert and oriented, understands why meds were prescribed and how to take, some confusion   [] Fair- alert and oriented, doesn't know medications without list, cannot answer specifics about medications, but has a decent process for which to take at home  [] Poor- does not know medications, may not have a process to take at home, may be cognitively unable to review at this time  [x]Medication management done by family member or facility, no concerns about historian accuracy.   [] Cannot assess at this time.     Medication Management:  [] Manages meds independently  [x] Family member/ other party manages meds/assists: Mickie  [] Meds managed by staff at facility  [] Meds set up by home care, family/other party helps administer  [] Meds set up by home care, self administers  [] Cannot assess at this time.     Other medications aside from PTA:  [x] Denies taking any other medications aside from those listed in PTA meds, this includes over-the-counter vitamins, supplements and analgesics.   [] Unable to confirm at this time.     Gely Mora on 2/15/2023 at 10:15 AM     Notifying appropriate party of changes/additions/discrepancies:  [x]No pertinent changes made, notification not necessary.   [] Notified attending provider via text page/phone call/sticky note or other:  [] Notified other:  [] Medications have not been reconciled by a provider yet, notification not necessary  [] Pt is not admitted to floor yet or patient is boarding, PTA meds completed before admission.     Medications Prior to Admission   Medication Sig Dispense Refill Last Dose     aspirin 81 MG tablet Take 81 mg by mouth daily with food   Past Week     ferrous gluconate (FERGON) 324 (38 Fe) MG tablet Take 1 tablet (324  mg) by mouth every other day 45 tablet 3 Past Week     ibuprofen (ADVIL/MOTRIN) 200 MG tablet Take 200 mg by mouth 2 times daily   Past Week     losartan (COZAAR) 50 MG tablet Take 1 tablet (50 mg) by mouth daily 90 tablet 3 Past Week     melatonin 3 MG tablet Take 3 mg by mouth nightly as needed for sleep   Unknown     metoprolol tartrate (LOPRESSOR) 25 MG tablet Take 1 tablet (25 mg) by mouth 2 times daily 180 tablet 3 Past Week     Multiple Vitamins-Minerals (MULTIVITAMIN ADULTS 50+) TABS Take 1 Dose by mouth every evening   Past Week     omeprazole (PRILOSEC) 20 MG DR capsule Take 1 capsule (20 mg) by mouth daily 90 capsule 3 Past Week     potassium chloride ER (KLOR-CON M) 10 MEQ CR tablet Take 1 tablet (10 mEq) by mouth daily 90 tablet 3 Past Week     venlafaxine (EFFEXOR XR) 75 MG 24 hr capsule TAKE 1 CAPSULE(75 MG) BY MOUTH DAILY 90 capsule 3 Past Week     Vitamin D, Cholecalciferol, 25 MCG (1000 UT) CAPS Take 1,000 mg by mouth every evening   Past Week     zinc gluconate 50 MG tablet Take 50 mg by mouth every evening   Past Week

## 2023-02-15 NOTE — PLAN OF CARE
"Patient is very confused, unable to respond to questions or prompts, calling out for \"mama\" all night. BP elevated, PRN labetalol ordered. Patient also does not easily allow blood pressures and has been requiring manual blood pressures. HR tachy, even with patient at rest. Turns Q2H, blanchable redness to buttocks and bilat feet/ankles. Patient prefers L side. According to patients daughter the patient has not left the inside of her house in 7 years. /82 (BP Location: Left arm)   Pulse 112   Temp 98  F (36.7  C) (Tympanic)   Resp 16   Ht 1.6 m (5' 3\")   Wt 45.4 kg (100 lb 1.4 oz)   SpO2 97%   BMI 17.73 kg/m      Face to face report given with opportunity to observe patient.    Report given to MERISSA Fry RN   2/15/2023  7:01 AM      "

## 2023-02-16 ENCOUNTER — APPOINTMENT (OUTPATIENT)
Dept: PHYSICAL THERAPY | Facility: HOSPITAL | Age: 88
DRG: 690 | End: 2023-02-16
Attending: INTERNAL MEDICINE
Payer: MEDICARE

## 2023-02-16 LAB
ANION GAP SERPL CALCULATED.3IONS-SCNC: 16 MMOL/L (ref 7–15)
BACTERIA UR CULT: NORMAL
BUN SERPL-MCNC: 15.5 MG/DL (ref 8–23)
CALCIUM SERPL-MCNC: 9.3 MG/DL (ref 8.2–9.6)
CHLORIDE SERPL-SCNC: 102 MMOL/L (ref 98–107)
CREAT SERPL-MCNC: 0.79 MG/DL (ref 0.51–0.95)
DEPRECATED HCO3 PLAS-SCNC: 21 MMOL/L (ref 22–29)
ERYTHROCYTE [DISTWIDTH] IN BLOOD BY AUTOMATED COUNT: 15.1 % (ref 10–15)
GFR SERPL CREATININE-BSD FRML MDRD: 69 ML/MIN/1.73M2
GLUCOSE SERPL-MCNC: 105 MG/DL (ref 70–99)
HCT VFR BLD AUTO: 39 % (ref 35–47)
HGB BLD-MCNC: 12.7 G/DL (ref 11.7–15.7)
HOLD SPECIMEN: NORMAL
LACTATE SERPL-SCNC: 1.4 MMOL/L (ref 0.7–2)
MCH RBC QN AUTO: 31.2 PG (ref 26.5–33)
MCHC RBC AUTO-ENTMCNC: 32.6 G/DL (ref 31.5–36.5)
MCV RBC AUTO: 96 FL (ref 78–100)
PLATELET # BLD AUTO: 330 10E3/UL (ref 150–450)
POTASSIUM SERPL-SCNC: 3.1 MMOL/L (ref 3.4–5.3)
POTASSIUM SERPL-SCNC: 3.5 MMOL/L (ref 3.4–5.3)
RBC # BLD AUTO: 4.07 10E6/UL (ref 3.8–5.2)
SODIUM SERPL-SCNC: 139 MMOL/L (ref 136–145)
WBC # BLD AUTO: 14 10E3/UL (ref 4–11)

## 2023-02-16 PROCEDURE — 250N000011 HC RX IP 250 OP 636: Performed by: INTERNAL MEDICINE

## 2023-02-16 PROCEDURE — 80048 BASIC METABOLIC PNL TOTAL CA: CPT | Performed by: INTERNAL MEDICINE

## 2023-02-16 PROCEDURE — 250N000013 HC RX MED GY IP 250 OP 250 PS 637: Performed by: INTERNAL MEDICINE

## 2023-02-16 PROCEDURE — 120N000001 HC R&B MED SURG/OB

## 2023-02-16 PROCEDURE — 250N000011 HC RX IP 250 OP 636: Performed by: HOSPITALIST

## 2023-02-16 PROCEDURE — 83605 ASSAY OF LACTIC ACID: CPT | Performed by: INTERNAL MEDICINE

## 2023-02-16 PROCEDURE — 250N000013 HC RX MED GY IP 250 OP 250 PS 637: Performed by: HOSPITALIST

## 2023-02-16 PROCEDURE — 84132 ASSAY OF SERUM POTASSIUM: CPT | Performed by: INTERNAL MEDICINE

## 2023-02-16 PROCEDURE — 99233 SBSQ HOSP IP/OBS HIGH 50: CPT | Performed by: INTERNAL MEDICINE

## 2023-02-16 PROCEDURE — 36415 COLL VENOUS BLD VENIPUNCTURE: CPT | Performed by: INTERNAL MEDICINE

## 2023-02-16 PROCEDURE — 97162 PT EVAL MOD COMPLEX 30 MIN: CPT | Mod: GP | Performed by: PHYSICAL THERAPIST

## 2023-02-16 PROCEDURE — 85027 COMPLETE CBC AUTOMATED: CPT | Performed by: INTERNAL MEDICINE

## 2023-02-16 PROCEDURE — 97530 THERAPEUTIC ACTIVITIES: CPT | Mod: GP | Performed by: PHYSICAL THERAPIST

## 2023-02-16 RX ORDER — VENLAFAXINE HYDROCHLORIDE 75 MG/1
75 CAPSULE, EXTENDED RELEASE ORAL
Status: DISCONTINUED | OUTPATIENT
Start: 2023-02-16 | End: 2023-02-21 | Stop reason: HOSPADM

## 2023-02-16 RX ORDER — POTASSIUM CHLORIDE 7.45 MG/ML
10 INJECTION INTRAVENOUS
Status: COMPLETED | OUTPATIENT
Start: 2023-02-16 | End: 2023-02-16

## 2023-02-16 RX ORDER — HALOPERIDOL 5 MG/ML
1 INJECTION INTRAMUSCULAR EVERY 6 HOURS PRN
Status: DISCONTINUED | OUTPATIENT
Start: 2023-02-16 | End: 2023-02-20

## 2023-02-16 RX ORDER — OLANZAPINE 10 MG/2ML
5 INJECTION, POWDER, FOR SOLUTION INTRAMUSCULAR DAILY PRN
Status: DISCONTINUED | OUTPATIENT
Start: 2023-02-16 | End: 2023-02-21 | Stop reason: HOSPADM

## 2023-02-16 RX ADMIN — TRAMADOL HYDROCHLORIDE 50 MG: 50 TABLET, COATED ORAL at 02:29

## 2023-02-16 RX ADMIN — ENOXAPARIN SODIUM 30 MG: 30 INJECTION SUBCUTANEOUS at 21:31

## 2023-02-16 RX ADMIN — POTASSIUM CHLORIDE 10 MEQ: 7.46 INJECTION, SOLUTION INTRAVENOUS at 09:47

## 2023-02-16 RX ADMIN — ASPIRIN 81 MG 81 MG: 81 TABLET ORAL at 11:26

## 2023-02-16 RX ADMIN — POTASSIUM CHLORIDE 10 MEQ: 7.46 INJECTION, SOLUTION INTRAVENOUS at 06:56

## 2023-02-16 RX ADMIN — Medication 1 MG: at 21:31

## 2023-02-16 RX ADMIN — METOPROLOL TARTRATE 25 MG: 25 TABLET, FILM COATED ORAL at 21:31

## 2023-02-16 RX ADMIN — OLANZAPINE 5 MG: 10 INJECTION, POWDER, FOR SOLUTION INTRAMUSCULAR at 02:42

## 2023-02-16 RX ADMIN — PANTOPRAZOLE SODIUM 40 MG: 40 TABLET, DELAYED RELEASE ORAL at 11:34

## 2023-02-16 RX ADMIN — AMLODIPINE BESYLATE 5 MG: 5 TABLET ORAL at 11:26

## 2023-02-16 RX ADMIN — VENLAFAXINE HYDROCHLORIDE 75 MG: 75 CAPSULE, EXTENDED RELEASE ORAL at 11:31

## 2023-02-16 RX ADMIN — CEFTRIAXONE SODIUM 1 G: 1 INJECTION, SOLUTION INTRAVENOUS at 16:10

## 2023-02-16 RX ADMIN — METOPROLOL TARTRATE 25 MG: 25 TABLET, FILM COATED ORAL at 11:30

## 2023-02-16 ASSESSMENT — ACTIVITIES OF DAILY LIVING (ADL)
ADLS_ACUITY_SCORE: 59
ADLS_ACUITY_SCORE: 59
ADLS_ACUITY_SCORE: 64
ADLS_ACUITY_SCORE: 67
ADLS_ACUITY_SCORE: 64
ADLS_ACUITY_SCORE: 67
ADLS_ACUITY_SCORE: 59
ADLS_ACUITY_SCORE: 59

## 2023-02-16 NOTE — PLAN OF CARE
Pt is Alert, oriented to self, VS as charted, afebrile, no indications of pain noted. On RA. IV saline locked. Incontinent of urine. Remains 1:1 for safety.

## 2023-02-16 NOTE — PLAN OF CARE
"/80 (BP Location: Right arm, Patient Position: Semi-Purvis's, Cuff Size: Adult Small)   Pulse 84   Temp 99.5  F (37.5  C) (Tympanic)   Resp 22   Ht 1.6 m (5' 3\")   Wt 45.4 kg (100 lb 1.4 oz)   SpO2 99%   BMI 17.73 kg/m      Pt is confused to all but self and very [place]. VSS with a low grade temp, on RA. PT calling out in pain, restless and uncomfortable see provider notification. PRN ultram given with relief, PAINAID scores 0/10 following meds. Assessments as charted. Incontinent of bowel and bladder, turned and changed q 2 hrs and as needed. Family at bedside. Remains on IV antibiotics see MAR. Remains free of injury, alarms on, and call light within reach. Face to face report given with opportunity to observe patient.    Report given to MERISSA Chacko RN   2/15/2023  7:38 PM      "

## 2023-02-16 NOTE — PLAN OF CARE
Goal Outcome Evaluation:    0345- requires 1:1 staff at this point, continuously attempting to sit up and swing legs over the bed.  Asking for her dog, thinks staff took it, wants to leave to take care of her dog Tiny. Sits up and sets off bed alarm, not steady and is difficult to redirect at this point.  Disoriented to place and situation, difficult to redirect.  Hits and yells out at staff. House supervisor notified need for 1:1 staff to help maintain patient's safety.

## 2023-02-16 NOTE — PLAN OF CARE
Goal Outcome Evaluation: patients son Krystian was called to update. He did request a social service, intervention, message was given to Marbella  with care transitions.

## 2023-02-16 NOTE — PROGRESS NOTES
02/16/23 1424   Appointment Info   Signing Clinician's Name / Credentials (PT) Valarie Monteiro DPT   Living Environment   People in Home child(jez), adult   Living Environment Comments Pt unable to provide any history.  Per chart, pt lives with her daughter   Self-Care   Activity/Exercise/Self-Care Comment Per chart, pt was ambulating independently and more or less independent with ADLs prior to admission.  Pt unable to provide history   General Information   Onset of Illness/Injury or Date of Surgery 02/14/23   Referring Physician Dr. Olivo   Patient/Family Therapy Goals Statement (PT) pt unable to verbalize goal, family would like pt to return home   Pertinent History of Current Problem (include personal factors and/or comorbidities that impact the POC) Pt admitted with acute cystitis with hematuria.  Pt with significant confusion at this time   Cognition   Affect/Mental Status (Cognition) confused   Orientation Status (Cognition) disoriented to;place;situation;time   Follows Commands (Cognition) 25-49% accuracy   Pain Assessment   Patient Currently in Pain Yes, see Vital Sign flowsheet  (reported pain in right foot with palpation and activity but unable to localize or rate)   Integumentary/Edema   Integumentary/Edema Comments scattered bruising noted   Posture    Posture Forward head position;Protracted shoulders   Range of Motion (ROM)   Range of Motion ROM is WFL   Strength (Manual Muscle Testing)   Strength (Manual Muscle Testing) Deficits observed during functional mobility   Strength Comments unable to perform MMT due to inability to follow commands   Bed Mobility   Bed Mobility supine-sit   Supine-Sit [place] (Bed Mobility) moderate assist (50% patient effort);2 person assist   Impairments Contributing to Impaired Bed Mobility pain  (impaired cognition and command following)   Assistive Device (Bed Mobility) bed rails   Transfers   Transfers sit-stand transfer   Sit-Stand Transfer   Sit-Stand  [place] (Transfers) moderate assist (50% patient effort);2 person assist   Assistive Device (Sit-Stand Transfers) walker, front-wheeled   Comment, (Sit-Stand Transfer) Pt needs constant verbal cues and physical assist to follow through with activity.  P tolerated standing up to FWW with mod A of 2 while brief checked   Balance   Balance Comments poor with support from walker   Clinical Impression   Criteria for Skilled Therapeutic Intervention Yes, treatment indicated   PT Diagnosis (PT) gait disturbance   Influenced by the following impairments impaired cognition and command following, decreased activity tolerance, decreased strength, pain   Functional limitations due to impairments decreased tolerance, safety, and independence with functional mobility   Clinical Presentation (PT Evaluation Complexity) Evolving/Changing   Clinical Presentation Rationale clinical judgement   Clinical Decision Making (Complexity) moderate complexity   Planned Therapy Interventions (PT) balance training;bed mobility training;gait training;patient/family education;strengthening;stair training;transfer training;progressive activity/exercise;risk factor education   Anticipated Equipment Needs at Discharge (PT)   (TBD)   Risk & Benefits of therapy have been explained evaluation/treatment results reviewed;care plan/treatment goals reviewed;risks/benefits reviewed;participants included;patient   Clinical Impression Comments PT evaluation completed.  Pt very confused, unable to follow commands, was able to complete transfer with assist of 2 but needs constant cues to complete tasks.  At this time, pt would not be appropriate to return home at current level of function.  Recommend short term rehab at this time although will need to see improvement in cognition and ability to follow commands for gains to be made.  Will see during acute care stay to progress activity as well as continue to assist with discharge planning   PT Total  Evaluation Time   PT Eval, Moderate Complexity Minutes (63605) 12   Physical Therapy Goals   PT Frequency 6x/week   PT Predicted Duration/Target Date for Goal Attainment 03/02/23   PT Goals Bed Mobility;Transfers;Gait   PT: Bed Mobility Supervision/stand-by assist;Supine to/from sit   PT: Transfers Supervision/stand-by assist;Sit to/from stand;Bed to/from chair;Assistive device   PT: Gait Supervision/stand-by assist;Rolling walker;100 feet   Interventions   Interventions Quick Adds Therapeutic Activity   Therapeutic Activity   Therapeutic Activities: dynamic activities to improve functional performance Minutes (11253) 9   Symptoms Noted During/After Treatment Increased pain   Treatment Detail/Skilled Intervention Pt attempting to take socks off once back in sitting position at EOB.  Pt complaining of pain in her right foot but unable to localize.  With cues pt transferred sit>stand from EOB with mod A of 2, has difficult time following commands to hold onto walker so hand hold assist provided.  Pt completed transfer bed>chair with mod A of 2, does bear weight through LEsv to assist with transfer.  Once seated provided pt with sips of apple juice.  Pt left sitting in chair with 1:1 present.   PT Discharge Planning   PT Plan Progress functional mobility as able   PT Discharge Recommendation (DC Rec) Transitional Care Facility   PT Rationale for DC Rec PT evaluation completed.  Pt very confused, unable to follow commands, was able to complete transfer with assist of 2 but needs constant cues to complete tasks.  At this time, pt would not be appropriate to return home at current level of function.  Recommend short term rehab at this time although will need to see improvement in cognition and ability to follow commands for gains to be made.  Will see during acute care stay to progress activity as well as continue to assist with discharge planning   PT Brief overview of current status mod A of 2 for all mobility,  cognition is limiting factor, does not follow verbal commands   Total Session Time   Timed Code Treatment Minutes 9   Total Session Time (sum of timed and untimed services) 21

## 2023-02-16 NOTE — PLAN OF CARE
No additional sets of vitals were obtained as the pt refused & was combative when writer tried to take vitals.

## 2023-02-16 NOTE — PLAN OF CARE
Face to face report given with opportunity to observe patient.    Report given to Tammy Antonio RN   2/16/2023  7:30 AM

## 2023-02-16 NOTE — PROGRESS NOTES
"[facility]    Hospitalist Progress Note    Date of Service (when I saw the patient): 02/16/2023    Assessment & Plan   Virginia Leone is a 89 year old female who was admitted on 2/14/2023.    Acute cystitis without hematuria: Causing acute encephalopathy/delirium  -Present on admission  -Nonhemorrhagic cystitis  -Follow urine cultures  -Continuing Rocephin    Dementia: With acute encephalopathy, delirium  -History of behavioral problems  -Close monitoring.  -no dementia medications on her home medication list  -prn zyprexa, prn haldol ordered  -1:1 sitter  -if no improvement consider head imaging     Essential hypertension  -She takes Cozaar at home  - BP not controlled  -Monitor and continue Cozaar for now  - Added amlodipine     Hypokalemia  -We will initiate nursing potassium replacement protocol     GERD  -Continue PPIs     Dehydration  -encourage PO intake  -IVFs stopped      Clinically Significant Risk Factors        # Hypokalemia: Lowest K = 3.1 mmol/L in last 2 days, will replace as needed                  # Cachexia: Estimated body mass index is 17.73 kg/m  as calculated from the following:    Height as of this encounter: 1.6 m (5' 3\").    Weight as of this encounter: 45.4 kg (100 lb 1.4 oz)., PRESENT ON ADMISSION  # Moderate Malnutrition: based on nutrition assessment, PRESENT ON ADMISSION       DVT Prophylaxis: Enoxaparin (Lovenox) SQ    Code Status: No CPR- Do NOT Intubate    Disposition: Patient is actually from home, lives with daughter.  PT/OT eval when patient cannot cooperate, may need placement.    Kenia Olivo MD, MD      Interval History   Patient seen in room.  Patient is awake, agitated intermittently, requiring one-to-one sitter for safety.  Does not cooperate with cares, hitting.    -Data reviewed today: I reviewed all new labs and imaging results over the last 24 hours. I personally reviewed imaging reports.    Physical Exam   Temp: 98.1  F (36.7  C) Temp src: Tympanic BP: " 145/78 Pulse: 95   Resp: 20 SpO2: 99 % O2 Device: None (Room air)    Vitals:    02/14/23 1917   Weight: 45.4 kg (100 lb 1.4 oz)     Vital Signs with Ranges  Temp:  [97.7  F (36.5  C)-99.5  F (37.5  C)] 98.1  F (36.7  C)  Pulse:  [] 95  Resp:  [18-22] 20  BP: (145-212)/(66-90) 145/78  SpO2:  [95 %-99 %] 99 %      Intake/Output Summary (Last 24 hours) at 2/16/2023 0908  Last data filed at 2/16/2023 0653  Gross per 24 hour   Intake 1083 ml   Output --   Net 1083 ml         Peripheral IV 02/16/23 Right;Dorsal Hand (Active)   Number of days: 0     No line/device    Constitutional -patient delirious, agitated intermittently  HEENT - atraumatic, normocephalic  CVS -refused auscultation  Respiratory -refused auscultation  GI - soft, NT, ND, refused auscultation  Musculoskeletal - no LE edema, no lesions  Neuro - not oriented, moves all extremities, no gross focal motor deficits      Medications       amLODIPine  5 mg Oral Daily     aspirin  81 mg Oral Daily     cefTRIAXone  1 g Intravenous Q24H     enoxaparin ANTICOAGULANT  30 mg Subcutaneous Q24H     melatonin  1 mg Oral At Bedtime     metoprolol tartrate  25 mg Oral BID     pantoprazole  40 mg Oral QAM AC     sodium chloride (PF)  3 mL Intracatheter Q8H     venlafaxine  75 mg Oral Daily with breakfast       Data   Recent Labs   Lab 02/16/23  0533 02/15/23  0530 02/15/23  0053 02/14/23  1956 02/14/23  1358   WBC 14.0* 11.1*  --   --  11.8*   HGB 12.7 12.1  --   --  12.9   MCV 96 98  --   --  93    295  --   --  364    138  --   --  139   POTASSIUM 3.1* 3.6 3.2*  --  3.2*   CHLORIDE 102 105  --   --  98   CO2 21* 22  --   --  25   BUN 15.5 19.2  --   --  22.4   CR 0.79 0.83  --  0.95 1.00*   ANIONGAP 16* 11  --   --  16*   REYMUNDO 9.3 8.2  --   --  8.9   * 77  --   --  106*     Lactic Acid   Date Value Ref Range Status   02/14/2023 1.4 0.7 - 2.0 mmol/L Final   02/14/2023 2.0 0.7 - 2.0 mmol/L Final       No results found for this or any previous visit  (from the past 24 hour(s)).    Kenia Olivo MD
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
## 2023-02-16 NOTE — PROGRESS NOTES
02/16/23 1500   Appointment Info   Signing Clinician's Name / Credentials (OT) Cuca Yusuf OTR/L   Appointment Canceled Reason (OT) With other staff/provider   Appointment Cancel Comments (OT) Attempted to see patient for OT evaluation. RN was in providing meds. OT waited about 5 minutes and then lab entered to see patient. OT unable to wait any longer due to caseload. Will attempt again tomorrow as able.
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
## 2023-02-16 NOTE — PROGRESS NOTES
Writer spoke with patient's son, Krystian. Krystian reports that he would like his mom to be placed at [facility] in [place] and that the owners are family friends and would love to accept patient for admission. Krystian also reports that patient would be able to pay privately for this placement, so the insurance will not effect her placement there. Krystian stated that he will be contacting [facility] and updating them on patient's current condition and will be giving them writer's contact info for placement follow-up.

## 2023-02-17 ENCOUNTER — APPOINTMENT (OUTPATIENT)
Dept: OCCUPATIONAL THERAPY | Facility: HOSPITAL | Age: 88
DRG: 690 | End: 2023-02-17
Attending: INTERNAL MEDICINE
Payer: MEDICARE

## 2023-02-17 ENCOUNTER — APPOINTMENT (OUTPATIENT)
Dept: PHYSICAL THERAPY | Facility: HOSPITAL | Age: 88
DRG: 690 | End: 2023-02-17
Attending: INTERNAL MEDICINE
Payer: MEDICARE

## 2023-02-17 LAB
ANION GAP SERPL CALCULATED.3IONS-SCNC: 12 MMOL/L (ref 7–15)
BUN SERPL-MCNC: 25.7 MG/DL (ref 8–23)
CALCIUM SERPL-MCNC: 9.2 MG/DL (ref 8.2–9.6)
CHLORIDE SERPL-SCNC: 104 MMOL/L (ref 98–107)
CREAT SERPL-MCNC: 1.06 MG/DL (ref 0.51–0.95)
DEPRECATED HCO3 PLAS-SCNC: 23 MMOL/L (ref 22–29)
ERYTHROCYTE [DISTWIDTH] IN BLOOD BY AUTOMATED COUNT: 15.7 % (ref 10–15)
GFR SERPL CREATININE-BSD FRML MDRD: 49 ML/MIN/1.73M2
GLUCOSE SERPL-MCNC: 159 MG/DL (ref 70–99)
HCT VFR BLD AUTO: 34.3 % (ref 35–47)
HGB BLD-MCNC: 11.2 G/DL (ref 11.7–15.7)
HOLD SPECIMEN: NORMAL
LACTATE SERPL-SCNC: 0.9 MMOL/L (ref 0.7–2)
MCH RBC QN AUTO: 31.5 PG (ref 26.5–33)
MCHC RBC AUTO-ENTMCNC: 32.7 G/DL (ref 31.5–36.5)
MCV RBC AUTO: 97 FL (ref 78–100)
PLATELET # BLD AUTO: 319 10E3/UL (ref 150–450)
POTASSIUM SERPL-SCNC: 3.4 MMOL/L (ref 3.4–5.3)
RBC # BLD AUTO: 3.55 10E6/UL (ref 3.8–5.2)
SODIUM SERPL-SCNC: 139 MMOL/L (ref 136–145)
WBC # BLD AUTO: 11.4 10E3/UL (ref 4–11)

## 2023-02-17 PROCEDURE — 120N000001 HC R&B MED SURG/OB

## 2023-02-17 PROCEDURE — 250N000011 HC RX IP 250 OP 636: Performed by: INTERNAL MEDICINE

## 2023-02-17 PROCEDURE — 97165 OT EVAL LOW COMPLEX 30 MIN: CPT | Mod: GO

## 2023-02-17 PROCEDURE — 250N000013 HC RX MED GY IP 250 OP 250 PS 637: Performed by: HOSPITALIST

## 2023-02-17 PROCEDURE — 99232 SBSQ HOSP IP/OBS MODERATE 35: CPT | Performed by: INTERNAL MEDICINE

## 2023-02-17 PROCEDURE — 250N000013 HC RX MED GY IP 250 OP 250 PS 637: Performed by: INTERNAL MEDICINE

## 2023-02-17 PROCEDURE — 97530 THERAPEUTIC ACTIVITIES: CPT | Mod: GP | Performed by: PHYSICAL THERAPIST

## 2023-02-17 PROCEDURE — 36415 COLL VENOUS BLD VENIPUNCTURE: CPT | Performed by: INTERNAL MEDICINE

## 2023-02-17 PROCEDURE — 85014 HEMATOCRIT: CPT | Performed by: INTERNAL MEDICINE

## 2023-02-17 PROCEDURE — 83605 ASSAY OF LACTIC ACID: CPT | Performed by: INTERNAL MEDICINE

## 2023-02-17 PROCEDURE — 80048 BASIC METABOLIC PNL TOTAL CA: CPT | Performed by: INTERNAL MEDICINE

## 2023-02-17 RX ADMIN — CEFTRIAXONE SODIUM 1 G: 1 INJECTION, SOLUTION INTRAVENOUS at 15:47

## 2023-02-17 RX ADMIN — ASPIRIN 81 MG 81 MG: 81 TABLET ORAL at 13:53

## 2023-02-17 RX ADMIN — AMLODIPINE BESYLATE 5 MG: 5 TABLET ORAL at 13:52

## 2023-02-17 RX ADMIN — Medication 1 MG: at 21:44

## 2023-02-17 RX ADMIN — VENLAFAXINE HYDROCHLORIDE 75 MG: 75 CAPSULE, EXTENDED RELEASE ORAL at 13:52

## 2023-02-17 RX ADMIN — ENOXAPARIN SODIUM 30 MG: 30 INJECTION SUBCUTANEOUS at 21:44

## 2023-02-17 RX ADMIN — METOPROLOL TARTRATE 25 MG: 25 TABLET, FILM COATED ORAL at 21:44

## 2023-02-17 RX ADMIN — METOPROLOL TARTRATE 25 MG: 25 TABLET, FILM COATED ORAL at 13:53

## 2023-02-17 RX ADMIN — PANTOPRAZOLE SODIUM 40 MG: 40 TABLET, DELAYED RELEASE ORAL at 13:52

## 2023-02-17 ASSESSMENT — ACTIVITIES OF DAILY LIVING (ADL)
ADLS_ACUITY_SCORE: 67
ADLS_ACUITY_SCORE: 69
ADLS_ACUITY_SCORE: 65
ADLS_ACUITY_SCORE: 67
ADLS_ACUITY_SCORE: 65
ADLS_ACUITY_SCORE: 65
ADLS_ACUITY_SCORE: 63
ADLS_ACUITY_SCORE: 67
ADLS_ACUITY_SCORE: 65

## 2023-02-17 NOTE — PROGRESS NOTES
"[facility]    Hospitalist Progress Note    Date of Service (when I saw the patient): 02/17/2023    Assessment & Plan   Virginia Leone is a 89 year old female who was admitted on 2/14/2023.    Acute cystitis without hematuria: Causing acute encephalopathy/delirium  -Present on admission  -Nonhemorrhagic cystitis  -Urine cultures grew mixed cony  -Continuing Rocephin for the time being, will complete 7 days antibiotic therapy    Dementia: With acute encephalopathy, delirium  -History of behavioral problems  -Close monitoring.  -no dementia medications on her home medication list  -prn zyprexa, prn haldol ordered  -1:1 sitter weaned now  -Could consider head imaging, although suspicion for intracranial process is low     Essential hypertension  -She takes Cozaar at home  - BP not controlled  -Monitor and continue Cozaar for now  - Added amlodipine     Hypokalemia  -We will initiate nursing potassium replacement protocol     GERD  -Continue PPIs     Dehydration  -encourage PO intake  -IVFs stopped      Clinically Significant Risk Factors        # Hypokalemia: Lowest K = 3.1 mmol/L in last 2 days, will replace as needed                  # Cachexia: Estimated body mass index is 17.73 kg/m  as calculated from the following:    Height as of this encounter: 1.6 m (5' 3\").    Weight as of this encounter: 45.4 kg (100 lb 1.4 oz)., PRESENT ON ADMISSION  # Moderate Malnutrition: based on nutrition assessment, PRESENT ON ADMISSION       DVT Prophylaxis: Enoxaparin (Lovenox) SQ    Code Status: No CPR- Do NOT Intubate    Disposition: Patient is actually from home, lives with daughter.  PT/OT eval when patient cannot cooperate, will likely need placement.    Kenia Olivo MD, MD      Interval History   Patient seen in room.  Patient reportedly had a good night, slept mostly.  Nix contrast to the night previous, with delirium, agitation.  Did not get any thing significant that would have caused sedation..    -Data " reviewed today: I reviewed all new labs and imaging results over the last 24 hours. I personally reviewed imaging reports.    Physical Exam   Temp: 100.4  F (38  C) Temp src: Tympanic BP: 150/60 Pulse: 97   Resp: 16 SpO2: 95 % O2 Device: None (Room air)    Vitals:    02/14/23 1917   Weight: 45.4 kg (100 lb 1.4 oz)     Vital Signs with Ranges  Temp:  [98.1  F (36.7  C)-100.4  F (38  C)] 100.4  F (38  C)  Pulse:  [89-98] 97  Resp:  [16] 16  BP: (150)/(60) 150/60  SpO2:  [95 %-96 %] 95 %      Intake/Output Summary (Last 24 hours) at 2/17/2023 0909  Last data filed at 2/16/2023 1120  Gross per 24 hour   Intake 120 ml   Output --   Net 120 ml         Peripheral IV 02/16/23 Right;Dorsal Hand (Active)   Site Assessment WDL 02/17/23 0423   Line Status Saline locked 02/17/23 0423   Phlebitis Scale 0-->no symptoms 02/17/23 0423   Infiltration Scale 0 02/17/23 0423   Infiltration Site Treatment Method  None 02/17/23 0423   If infiltrated, was a vesicant infusing? No 02/17/23 0423   Number of days: 1     No line/device    Constitutional - patient is currently resting, agitated intermittently  HEENT - atraumatic, normocephalic  CVS -refused auscultation  Respiratory -refused auscultation  GI - soft, NT, ND, refused auscultation  Musculoskeletal - no LE edema, no lesions  Neuro - not oriented, moves all extremities, no gross focal motor deficits      Medications       amLODIPine  5 mg Oral Daily     aspirin  81 mg Oral Daily     cefTRIAXone  1 g Intravenous Q24H     enoxaparin ANTICOAGULANT  30 mg Subcutaneous Q24H     melatonin  1 mg Oral At Bedtime     metoprolol tartrate  25 mg Oral BID     pantoprazole  40 mg Oral QAM AC     sodium chloride (PF)  3 mL Intracatheter Q8H     venlafaxine  75 mg Oral Daily with breakfast       Data   Recent Labs   Lab 02/16/23  1130 02/16/23  0533 02/15/23  0530 02/15/23  0053 02/14/23  1956 02/14/23  1358   WBC  --  14.0* 11.1*  --   --  11.8*   HGB  --  12.7 12.1  --   --  12.9   MCV  --  96 98   --   --  93   PLT  --  330 295  --   --  364   NA  --  139 138  --   --  139   POTASSIUM 3.5 3.1* 3.6   < >  --  3.2*   CHLORIDE  --  102 105  --   --  98   CO2  --  21* 22  --   --  25   BUN  --  15.5 19.2  --   --  22.4   CR  --  0.79 0.83  --  0.95 1.00*   ANIONGAP  --  16* 11  --   --  16*   REYMUNDO  --  9.3 8.2  --   --  8.9   GLC  --  105* 77  --   --  106*    < > = values in this interval not displayed.     Lactic Acid   Date Value Ref Range Status   02/14/2023 1.4 0.7 - 2.0 mmol/L Final   02/14/2023 2.0 0.7 - 2.0 mmol/L Final       No results found for this or any previous visit (from the past 24 hour(s)).    Kenia Olivo MD

## 2023-02-17 NOTE — PROGRESS NOTES
02/17/23 8253   Appointment Info   Signing Clinician's Name / Credentials (PT) Valarie Monteiro DPSHANI   Interventions   Interventions Quick Adds Therapeutic Activity   Therapeutic Activity   Therapeutic Activities: dynamic activities to improve functional performance Minutes (38764) 19   Symptoms Noted During/After Treatment Increased pain   Treatment Detail/Skilled Intervention Pt resting in bed upon PT arrival, remains confused.  Pt complains of pains at various times when touched but unable to localize.  Pt transferred sup>sit mod A to EOB.  Initially had LOB in sitting but once assisted back up was able to maintain sitting with SBA.  Pt transferred sit>stand mod A of 2 with cues for pt to hold onto staff's arms, not able to follow commands to use FWW safely.  Pt was able to complete a pivot transfer from bed>chair with mod A of 2, does bear some weight through LEs however has difficulty taking steps and following commands, pt is fearful.  Once in chair pt was able to follow cues to scoot back in chair.  Attempted to instruct pt to participate in seated therex, completed a few repetitions but not able to stay on task and continue despite continued cues provided.  Pt left sitting in chair with call light in place and clip alarm on.   PT Discharge Planning   PT Plan Progress functional mobility as able   PT Discharge Recommendation (DC Rec) Long term care facility;Transitional Care Facility   PT Rationale for DC Rec Pt not safe to return home, requiring heavy assist of 2.  If cognition improves may be appropriate for short term rehab but currently is not following commands well enough to actively participate.  Long term care needs to be considered but will possibly need availability of felipe as pt's functional status does not appear to be improving at this time.   PT Brief overview of current status Pt resting in bed upon PT arrival, remains confused.  Pt complains of pains at various times when touched but unable  to localize.  Pt transferred sup>sit mod A to EOB.  Initially had LOB in sitting but once assisted back up was able to maintain sitting with SBA.  Pt transferred sit>stand mod A of 2 with cues for pt to hold onto staff's arms, not able to follow commands to use FWW safely.  Pt was able to complete a pivot transfer from bed>chair with mod A of 2, does bear some weight through LEs however has difficulty taking steps and following commands, pt is fearful.  Once in chair pt was able to follow cues to scoot back in chair.  Attempted to instruct pt to participate in seated therex, completed a few repetitions but not able to stay on task and continue despite continued cues provided.  Pt left sitting in chair with call light in place and clip alarm on.   Total Session Time   Timed Code Treatment Minutes 19   Total Session Time (sum of timed and untimed services) 19

## 2023-02-17 NOTE — PHARMACY
Pharmacy Antimicrobial Stewardship Assessment     Current Antimicrobial Therapy:  Anti-infectives (From now, onward)    Start     Dose/Rate Route Frequency Ordered Stop    02/15/23 1600  cefTRIAXone in d5w (ROCEPHIN) intermittent infusion 1 g         1 g  over 30 Minutes Intravenous EVERY 24 HOURS 23 191            Indication: UTI    Days of Therapy: 4     Pertinent Labs:    Recent labs: (last 7 days)     23  1358 23  1403 23  1956 02/15/23  0530 23  0533 23  0959 23  1147   WBC 11.8*  --   --  11.1* 14.0*  --  11.4*   LACT  --  2.0 1.4  --   --  1.4  --        Temperature:  Temp (24hrs), Av.9  F (37.2  C), Min:98.1  F (36.7  C), Max:100.4  F (38  C)      Culture Results:       Recommendations/Interventions:  1. None at this time.    Lorena Fontanez, [facility]  2023

## 2023-02-17 NOTE — PROGRESS NOTES
"   02/17/23 0900   Appointment Info   Signing Clinician's Name / Credentials (OT) Cuca Yusuf, OTR/L   Rehab Comments (OT) Pt confused and very [place] therefore unable to provide information about PLOF/living arranagements. Info was gathered from chart review.   Living Environment   People in Home child(jez), adult   Living Environment Comments Ramp   Self-Care   Activity/Exercise/Self-Care Comment Pt was typically independent in ADLs and furniture walked. She washed up at the sink. Has equipment but wasn't using.   Instrumental Activities of Daily Living (IADL)   IADL Comments Family manages meds and household chores.   General Information   Onset of Illness/Injury or Date of Surgery 02/14/23   Referring Physician Dr. Olivo   Patient/Family Therapy Goal Statement (OT) Pt unable to verbalize meaningful goal   Additional Occupational Profile Info/Pertinent History of Current Problem Pt admitted due to acute cystitis with hematuria. Pt confused.   Cognitive Status Examination   Orientation Status person  (with mild verbal cues)   Affect/Mental Status (Cognitive) confused;low arousal/lethargic   Follows Commands 25-49% accuracy   Cognitive Status Comments Per chart review, pt typically alert and oriented   Pain Assessment   Patient Currently in Pain   (pt stating \"ow\" and facial grimacing when moving though unable to state where she is having pain or rate)   Range of Motion Comprehensive   General Range of Motion bilateral upper extremity ROM WFL   Strength Comprehensive (MMT)   Comment, General Manual Muscle Testing (MMT) Assessment Unable to follow commands to complete MMT   Bed Mobility   Comment (Bed Mobility) Pt up in the chair upon OT arrival   Transfers   Transfer Comments PT notes indicate pt is requiring modA x2 for transferring   Activities of Daily Living   BADL Assessment/Intervention feeding;grooming;lower body dressing;toileting   Lower Body Dressing Assessment/Training   [place] Level (Lower Body " Dressing) dependent (less than 25% patient effort)   Grooming Assessment/Training   [place] Level (Grooming) moderate assist (50% patient effort);set up;verbal cues   Comment, (Grooming) pt able to complete some but ultimately requires assistance   Eating/Self Feeding   [place] Level (Feeding) moderate assist (50% patient effort)   Comment, (Feeding) to drink water   Toileting   [place] Level (Toileting) dependent (less than 25% patient effort)   Clinical Impression   Criteria for Skilled Therapeutic Interventions Met (OT) Yes, treatment indicated   OT Diagnosis Impaired ADLs   OT Problem List-Impairments impacting ADL problems related to;activity tolerance impaired;balance;cognition;mobility;strength   Assessment of Occupational Performance 1-3 Performance Deficits   Identified Performance Deficits ADLs   Planned Therapy Interventions (OT) ADL retraining;cognition;risk factor education;progressive activity/exercise;strengthening   Clinical Decision Making Complexity (OT) low complexity   Risk & Benefits of therapy have been explained evaluation/treatment results reviewed;care plan/treatment goals reviewed;risks/benefits reviewed;patient   Clinical Impression Comments Pt is not safe to return home at her current level of funcitoning.   OT Total Evaluation Time   OT Cami Low Complexity Minutes (79282) 11   OT Goals   Therapy Frequency (OT) 5 times/wk   OT Predicted Duration/Target Date for Goal Attainment 02/24/23   OT Goals Hygiene/Grooming;Lower Body Dressing;Toilet Transfer/Toileting   OT: Hygiene/Grooming supervision/stand-by assist   OT: Lower Body Dressing Supervision/stand-by assist   OT: Toilet Transfer/Toileting Supervision/stand-by assist   OT Discharge Planning   OT Plan Progress ADLs, activity tolerance, and cognition as able   OT Discharge Recommendation (DC Rec) Transitional Care Facility   OT Rationale for DC Rec Due to pt's current level of functioning   OT Brief overview of current  status dependent for LB dressing and toileting, modA grooming and feeding (drinking water), modA x2 for transferring   Total Session Time   Total Session Time (sum of timed and untimed services) 11

## 2023-02-17 NOTE — PLAN OF CARE
Goal Outcome Evaluation:Face to face report given with opportunity to observe patient.    Report given to Brenda Ribeiro RN   2/16/2023  8:02 PM

## 2023-02-17 NOTE — PROGRESS NOTES
Writer spoke with Marina at [facility]. She is interested in admitting patient and has 1 available room. She would like to come to the hospital to assess patient to ensure she is appropriate for the facility in her current state. Marina reports that she will be coming to the hospital on Saturday, 02/18. She did say that if she is able to take patient, it would not be until Monday, at the earliest.   Writer then spoke with patient and her 2 daughters, Aliza and Mickie, who were in the room with patient. Patient and family were updated on the possibility of patient being placed at [facility] and the fact that there will likely not be any updates until Monday. They were all agreeable with this plan.
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
## 2023-02-17 NOTE — PLAN OF CARE
Goal Outcome Evaluation: Not Progressing     Vitals as charted. Pt refused some vitals & let us take others. Room air. No SOB. No cough. All fields clear, diminished. Pt slept through the entire night. PO Night medications were given with a sip of water. Lovenox was given also. Incontinent of urine. Face to face report given with opportunity to observe patient.    Report given to Solomon NGUYEN RN.     Brooke Sheridan RN   2/17/2023  7:47 AM

## 2023-02-18 LAB
ANION GAP SERPL CALCULATED.3IONS-SCNC: 11 MMOL/L (ref 7–15)
BUN SERPL-MCNC: 31.7 MG/DL (ref 8–23)
CALCIUM SERPL-MCNC: 8.8 MG/DL (ref 8.2–9.6)
CHLORIDE SERPL-SCNC: 104 MMOL/L (ref 98–107)
CREAT SERPL-MCNC: 1.09 MG/DL (ref 0.51–0.95)
DEPRECATED HCO3 PLAS-SCNC: 24 MMOL/L (ref 22–29)
ERYTHROCYTE [DISTWIDTH] IN BLOOD BY AUTOMATED COUNT: 15.8 % (ref 10–15)
GFR SERPL CREATININE-BSD FRML MDRD: 47 ML/MIN/1.73M2
GLUCOSE SERPL-MCNC: 132 MG/DL (ref 70–99)
HCT VFR BLD AUTO: 31.2 % (ref 35–47)
HGB BLD-MCNC: 10.1 G/DL (ref 11.7–15.7)
MCH RBC QN AUTO: 31.4 PG (ref 26.5–33)
MCHC RBC AUTO-ENTMCNC: 32.4 G/DL (ref 31.5–36.5)
MCV RBC AUTO: 97 FL (ref 78–100)
PLATELET # BLD AUTO: 294 10E3/UL (ref 150–450)
POTASSIUM SERPL-SCNC: 3.8 MMOL/L (ref 3.4–5.3)
RBC # BLD AUTO: 3.22 10E6/UL (ref 3.8–5.2)
SODIUM SERPL-SCNC: 139 MMOL/L (ref 136–145)
WBC # BLD AUTO: 10.9 10E3/UL (ref 4–11)

## 2023-02-18 PROCEDURE — 120N000001 HC R&B MED SURG/OB

## 2023-02-18 PROCEDURE — 85027 COMPLETE CBC AUTOMATED: CPT | Performed by: INTERNAL MEDICINE

## 2023-02-18 PROCEDURE — 36415 COLL VENOUS BLD VENIPUNCTURE: CPT | Performed by: INTERNAL MEDICINE

## 2023-02-18 PROCEDURE — 250N000011 HC RX IP 250 OP 636: Performed by: INTERNAL MEDICINE

## 2023-02-18 PROCEDURE — 99232 SBSQ HOSP IP/OBS MODERATE 35: CPT | Performed by: INTERNAL MEDICINE

## 2023-02-18 PROCEDURE — 250N000013 HC RX MED GY IP 250 OP 250 PS 637: Performed by: INTERNAL MEDICINE

## 2023-02-18 PROCEDURE — 250N000013 HC RX MED GY IP 250 OP 250 PS 637: Performed by: HOSPITALIST

## 2023-02-18 PROCEDURE — 80048 BASIC METABOLIC PNL TOTAL CA: CPT | Performed by: INTERNAL MEDICINE

## 2023-02-18 RX ADMIN — METOPROLOL TARTRATE 25 MG: 25 TABLET, FILM COATED ORAL at 20:04

## 2023-02-18 RX ADMIN — ACETAMINOPHEN 650 MG: 325 TABLET, FILM COATED ORAL at 20:04

## 2023-02-18 RX ADMIN — VENLAFAXINE HYDROCHLORIDE 75 MG: 75 CAPSULE, EXTENDED RELEASE ORAL at 08:54

## 2023-02-18 RX ADMIN — CEFTRIAXONE SODIUM 1 G: 1 INJECTION, SOLUTION INTRAVENOUS at 15:59

## 2023-02-18 RX ADMIN — AMLODIPINE BESYLATE 5 MG: 5 TABLET ORAL at 08:54

## 2023-02-18 RX ADMIN — PANTOPRAZOLE SODIUM 40 MG: 40 TABLET, DELAYED RELEASE ORAL at 08:54

## 2023-02-18 RX ADMIN — ASPIRIN 81 MG 81 MG: 81 TABLET ORAL at 08:54

## 2023-02-18 RX ADMIN — Medication 1 MG: at 20:04

## 2023-02-18 RX ADMIN — ENOXAPARIN SODIUM 30 MG: 30 INJECTION SUBCUTANEOUS at 20:04

## 2023-02-18 RX ADMIN — ACETAMINOPHEN 650 MG: 325 TABLET, FILM COATED ORAL at 11:46

## 2023-02-18 RX ADMIN — METOPROLOL TARTRATE 25 MG: 25 TABLET, FILM COATED ORAL at 08:54

## 2023-02-18 ASSESSMENT — ACTIVITIES OF DAILY LIVING (ADL)
ADLS_ACUITY_SCORE: 65
ADLS_ACUITY_SCORE: 69
ADLS_ACUITY_SCORE: 69
ADLS_ACUITY_SCORE: 65
ADLS_ACUITY_SCORE: 65
ADLS_ACUITY_SCORE: 69

## 2023-02-18 NOTE — PROGRESS NOTES
02/18/23 1300   Appointment Info   Signing Clinician's Name / Credentials (PT) Shirley Rodriguez DPT, OCS, Cert DN   Appointment Canceled Reason (PT) With other staff/provider   Appointment Cancel Comments (PT) Attempted to see patient x2 but was with family and other staff both times. No change to current recommendations based on discussions with nursing.

## 2023-02-18 NOTE — PLAN OF CARE
"Goal Outcome Evaluation:         Pt calm, confused. VSS on RA, c/o intermittent pain saying \"ow\", prn tylenol given. PIV SL between antibx. Up in chair most of shift, Ax2. Inc of B/B. Needs assist eating, poor appetite. Turn and repo every 2 hours. Family  visiting on and off, [facility] staff visited pt this afternoon.    Face to face report given with opportunity to observe patient.    Report given to MERISSA Abraham RN   2/18/2023  7:10 PM                 "

## 2023-02-18 NOTE — PHARMACY-MEDICATION REGIMEN REVIEW
Pharmacy Antimicrobial Stewardship Assessment     Current Antimicrobial Therapy:  Anti-infectives (From now, onward)    Start     Dose/Rate Route Frequency Ordered Stop    02/15/23 1600  cefTRIAXone in d5w (ROCEPHIN) intermittent infusion 1 g         1 g  over 30 Minutes Intravenous EVERY 24 HOURS 23 191            Indication: UTI    Days of Therapy: 5     Pertinent Labs:  Recent Labs   Lab Test 23  0620 23  1147 23  0533   WBC 10.9 11.4* 14.0*     Recent Labs   Lab Test 23  1412 23  0959   LACT 0.9 1.4        Temperature:  Temp (24hrs), Av.8  F (37.7  C), Min:99.7  F (37.6  C), Max:99.9  F (37.7  C)    Culture Results:   : Blood = no growth  : Urine = mixed urogenital cony       Recommendations/Interventions:  No recommendations at this time. Will continue to monitor.     Renetta Monterroso RPH  2023

## 2023-02-18 NOTE — PLAN OF CARE
"Reason for hospital stay: UTI, delirium/dementia    Most recent vitals: /58 (BP Location: Left arm, Patient Position: Right side, Cuff Size: Adult Regular)   Pulse 88   Temp 99.9  F (37.7  C) (Tympanic)   Resp 16   Ht 1.6 m (5' 3\")   Wt 45.4 kg (100 lb 1.4 oz)   SpO2 94%   BMI 17.73 kg/m      Pain Management: denies any pain  LOC: A&O x0  Cardiac: HRR  Respiratory: LS clear & equal; diminished in bases. Maintained on RA overnight  GI: Incontinent. No BM this shift. BS normoactive  : Incontinent  Skin Issues: Skin pale in color. Scattered bruising. Redness blanchable on coccyx. No open areas noted    IVF: 22G right wrist SL  ABX: IV Rocephin q24H    Nutrition: regular diet, thin liquids  ADL's: Dependent  Ambulation: TR q2H, not OOB overnight  Safety: Does not utilize call light. Bed alarm in place d/t confusion. Video monitoring in place & room close to nurse's station w/ door open. ID band on. Non-skid socks on. Side rails up x2. Hourly checks complete    Comments: Pt asleep all night. Cooperative with cares and med admin. Tolerated swallowing water with pills. Incontinent of urine overnight. Manual BP checked @ HS. Plan to discharge to [facility] once medically stable.     Face to face report given with opportunity to observe patient.    Report given to MERISSA Esteban RN   2/18/2023  7:00 AM            "

## 2023-02-18 NOTE — PROGRESS NOTES
"[facility]    Hospitalist Progress Note    Date of Service (when I saw the patient): 02/18/2023    Assessment & Plan   Virginia Leone is a 89 year old female who was admitted on 2/14/2023.    Acute cystitis without hematuria: Causing acute encephalopathy/delirium  -Present on admission  -Nonhemorrhagic cystitis  -Urine cultures grew mixed cony  -Continuing Rocephin for the time being, will complete 7 days antibiotic therapy    Dementia: With acute encephalopathy, delirium  -History of behavioral problems  -Close monitoring.  -no dementia medications on her home medication list  -prn zyprexa, prn haldol ordered  -1:1 sitter weaned for now  -Could consider head imaging, although suspicion for intracranial process is low     Essential hypertension  -She takes Cozaar at home  - BP not controlled  -Monitor and continue Cozaar for now  - Added amlodipine     Hypokalemia  -We will initiate nursing potassium replacement protocol     GERD  -Continue PPIs     Dehydration  -encourage PO intake  -IVFs stopped      Clinically Significant Risk Factors                         # Cachexia: Estimated body mass index is 17.73 kg/m  as calculated from the following:    Height as of this encounter: 1.6 m (5' 3\").    Weight as of this encounter: 45.4 kg (100 lb 1.4 oz)., PRESENT ON ADMISSION  # Moderate Malnutrition: based on nutrition assessment, PRESENT ON ADMISSION       DVT Prophylaxis: Enoxaparin (Lovenox) SQ    Code Status: No CPR- Do NOT Intubate    Disposition: Patient is actually from home, lives with daughter.  Placement at assisted living pending.    Kenia Olivo MD, MD      Interval History   Patient seen in room.  Patient reportedly had a good night again, slept mostly.  Dementia is apparent.    -Data reviewed today: I reviewed all new labs and imaging results over the last 24 hours. I personally reviewed imaging reports.    Physical Exam   Temp: 99.9  F (37.7  C) Temp src: Tympanic BP: 122/58 Pulse: 88   " Resp: 16 SpO2: 94 % O2 Device: None (Room air)    Vitals:    02/14/23 1917   Weight: 45.4 kg (100 lb 1.4 oz)     Vital Signs with Ranges  Temp:  [99.9  F (37.7  C)-100  F (37.8  C)] 99.9  F (37.7  C)  Pulse:  [] 88  Resp:  [16] 16  BP: (118-122)/(54-58) 122/58  SpO2:  [94 %-97 %] 94 %      Intake/Output Summary (Last 24 hours) at 2/18/2023 0848  Last data filed at 2/18/2023 0556  Gross per 24 hour   Intake 720 ml   Output --   Net 720 ml         Peripheral IV 02/16/23 Right;Dorsal Hand (Active)   Site Assessment WDL 02/17/23 2157   Line Status Saline locked 02/17/23 2157   Phlebitis Scale 0-->no symptoms 02/17/23 2157   Infiltration Scale 0 02/17/23 2157   Infiltration Site Treatment Method  None 02/17/23 2157   If infiltrated, was a vesicant infusing? No 02/17/23 0423   Number of days: 2     No line/device    Constitutional - patient is currently resting  HEENT - atraumatic, normocephalic  CVS -refused auscultation  Respiratory -refused auscultation  GI - soft, NT, ND, refused auscultation  Musculoskeletal - no LE edema, no lesions  Neuro - not oriented, moves all extremities, no gross focal motor deficits      Medications       amLODIPine  5 mg Oral Daily     aspirin  81 mg Oral Daily     cefTRIAXone  1 g Intravenous Q24H     enoxaparin ANTICOAGULANT  30 mg Subcutaneous Q24H     melatonin  1 mg Oral At Bedtime     metoprolol tartrate  25 mg Oral BID     pantoprazole  40 mg Oral QAM AC     sodium chloride (PF)  3 mL Intracatheter Q8H     venlafaxine  75 mg Oral Daily with breakfast       Data   Recent Labs   Lab 02/18/23  0620 02/17/23  1147 02/16/23  1130 02/16/23  0533   WBC 10.9 11.4*  --  14.0*   HGB 10.1* 11.2*  --  12.7   MCV 97 97  --  96    319  --  330    139  --  139   POTASSIUM 3.8 3.4 3.5 3.1*   CHLORIDE 104 104  --  102   CO2 24 23  --  21*   BUN 31.7* 25.7*  --  15.5   CR 1.09* 1.06*  --  0.79   ANIONGAP 11 12  --  16*   REYMUNDO 8.8 9.2  --  9.3   * 159*  --  105*     Lactic Acid    Date Value Ref Range Status   02/14/2023 1.4 0.7 - 2.0 mmol/L Final   02/14/2023 2.0 0.7 - 2.0 mmol/L Final       No results found for this or any previous visit (from the past 24 hour(s)).    Kenia Olivo MD

## 2023-02-19 LAB
ANION GAP SERPL CALCULATED.3IONS-SCNC: 12 MMOL/L (ref 7–15)
BACTERIA BLD CULT: NO GROWTH
BACTERIA BLD CULT: NO GROWTH
BUN SERPL-MCNC: 31.1 MG/DL (ref 8–23)
CALCIUM SERPL-MCNC: 9.1 MG/DL (ref 8.2–9.6)
CHLORIDE SERPL-SCNC: 101 MMOL/L (ref 98–107)
CREAT SERPL-MCNC: 1.13 MG/DL (ref 0.51–0.95)
DEPRECATED HCO3 PLAS-SCNC: 24 MMOL/L (ref 22–29)
ERYTHROCYTE [DISTWIDTH] IN BLOOD BY AUTOMATED COUNT: 15.7 % (ref 10–15)
GFR SERPL CREATININE-BSD FRML MDRD: 45 ML/MIN/1.73M2
GLUCOSE SERPL-MCNC: 103 MG/DL (ref 70–99)
HCT VFR BLD AUTO: 29.8 % (ref 35–47)
HGB BLD-MCNC: 9.9 G/DL (ref 11.7–15.7)
MCH RBC QN AUTO: 31.7 PG (ref 26.5–33)
MCHC RBC AUTO-ENTMCNC: 33.2 G/DL (ref 31.5–36.5)
MCV RBC AUTO: 96 FL (ref 78–100)
PLATELET # BLD AUTO: 298 10E3/UL (ref 150–450)
POTASSIUM SERPL-SCNC: 3.3 MMOL/L (ref 3.4–5.3)
POTASSIUM SERPL-SCNC: 4.5 MMOL/L (ref 3.4–5.3)
RBC # BLD AUTO: 3.12 10E6/UL (ref 3.8–5.2)
SODIUM SERPL-SCNC: 137 MMOL/L (ref 136–145)
WBC # BLD AUTO: 8.7 10E3/UL (ref 4–11)

## 2023-02-19 PROCEDURE — 84132 ASSAY OF SERUM POTASSIUM: CPT | Performed by: INTERNAL MEDICINE

## 2023-02-19 PROCEDURE — 36415 COLL VENOUS BLD VENIPUNCTURE: CPT | Performed by: INTERNAL MEDICINE

## 2023-02-19 PROCEDURE — 120N000001 HC R&B MED SURG/OB

## 2023-02-19 PROCEDURE — 250N000013 HC RX MED GY IP 250 OP 250 PS 637: Performed by: INTERNAL MEDICINE

## 2023-02-19 PROCEDURE — 99232 SBSQ HOSP IP/OBS MODERATE 35: CPT | Performed by: NURSE PRACTITIONER

## 2023-02-19 PROCEDURE — 258N000003 HC RX IP 258 OP 636: Performed by: NURSE PRACTITIONER

## 2023-02-19 PROCEDURE — 250N000011 HC RX IP 250 OP 636: Performed by: INTERNAL MEDICINE

## 2023-02-19 PROCEDURE — 85027 COMPLETE CBC AUTOMATED: CPT | Performed by: INTERNAL MEDICINE

## 2023-02-19 PROCEDURE — 80048 BASIC METABOLIC PNL TOTAL CA: CPT | Performed by: INTERNAL MEDICINE

## 2023-02-19 PROCEDURE — 250N000013 HC RX MED GY IP 250 OP 250 PS 637: Performed by: HOSPITALIST

## 2023-02-19 RX ORDER — POTASSIUM CHLORIDE 750 MG/1
10 CAPSULE, EXTENDED RELEASE ORAL DAILY
Status: DISCONTINUED | OUTPATIENT
Start: 2023-02-20 | End: 2023-02-21 | Stop reason: HOSPADM

## 2023-02-19 RX ORDER — POTASSIUM CHLORIDE 1.5 G/1.58G
20 POWDER, FOR SOLUTION ORAL ONCE
Status: COMPLETED | OUTPATIENT
Start: 2023-02-19 | End: 2023-02-19

## 2023-02-19 RX ORDER — LOSARTAN POTASSIUM 25 MG/1
50 TABLET ORAL DAILY
Status: DISCONTINUED | OUTPATIENT
Start: 2023-02-20 | End: 2023-02-21 | Stop reason: HOSPADM

## 2023-02-19 RX ORDER — VENLAFAXINE HYDROCHLORIDE 37.5 MG/1
37.5 CAPSULE, EXTENDED RELEASE ORAL
Status: DISCONTINUED | OUTPATIENT
Start: 2023-02-20 | End: 2023-02-20

## 2023-02-19 RX ADMIN — AMLODIPINE BESYLATE 5 MG: 5 TABLET ORAL at 08:48

## 2023-02-19 RX ADMIN — ACETAMINOPHEN 650 MG: 325 TABLET, FILM COATED ORAL at 09:12

## 2023-02-19 RX ADMIN — ASPIRIN 81 MG 81 MG: 81 TABLET ORAL at 08:48

## 2023-02-19 RX ADMIN — PANTOPRAZOLE SODIUM 40 MG: 40 TABLET, DELAYED RELEASE ORAL at 08:48

## 2023-02-19 RX ADMIN — SODIUM CHLORIDE 500 ML: 9 INJECTION, SOLUTION INTRAVENOUS at 15:28

## 2023-02-19 RX ADMIN — METOPROLOL TARTRATE 25 MG: 25 TABLET, FILM COATED ORAL at 23:34

## 2023-02-19 RX ADMIN — Medication 1 MG: at 23:34

## 2023-02-19 RX ADMIN — METOPROLOL TARTRATE 25 MG: 25 TABLET, FILM COATED ORAL at 08:48

## 2023-02-19 RX ADMIN — POTASSIUM CHLORIDE 20 MEQ: 1.5 POWDER, FOR SOLUTION ORAL at 06:24

## 2023-02-19 RX ADMIN — CEFTRIAXONE SODIUM 1 G: 1 INJECTION, SOLUTION INTRAVENOUS at 15:28

## 2023-02-19 RX ADMIN — VENLAFAXINE HYDROCHLORIDE 75 MG: 75 CAPSULE, EXTENDED RELEASE ORAL at 08:48

## 2023-02-19 ASSESSMENT — ACTIVITIES OF DAILY LIVING (ADL)
ADLS_ACUITY_SCORE: 65
ADLS_ACUITY_SCORE: 69
ADLS_ACUITY_SCORE: 65
ADLS_ACUITY_SCORE: 69

## 2023-02-19 NOTE — PHARMACY
[facility]    Pharmacy      Antimicrobial Stewardship Note     Current antimicrobial therapy:      Culture Results:      Recommendations/Interventions:  1. none      Zahida Thornton RPH  February 19, 2023

## 2023-02-19 NOTE — PLAN OF CARE
"Reason for hospital stay: UTI, delirium/dementia     Most recent vitals: /58 (BP Location: Left arm, Patient Position: Right side, Cuff Size: Adult Regular)   Pulse 88   Temp 99.9  F (37.7  C) (Tympanic)   Resp 16   Ht 1.6 m (5' 3\")   Wt 45.4 kg (100 lb 1.4 oz)   SpO2 94%   BMI 17.73 kg/m       Pain Management: PRN Tylenol given x1 dose before bed   LOC: A&O x0  Cardiac: HRR  Respiratory: LS clear & equal; diminished in bases. Maintained on RA overnight  GI: Incontinent. No BM this shift. BS hypoactive  : Incontinent  Skin Issues: Skin pale in color. Scattered bruising. Redness blanchable on coccyx. No open areas noted     IVF: 22G right wrist SL  ABX: IV Rocephin q24H     Nutrition: regular diet, thin liquids  ADL's: Dependent  Ambulation: TR q2H, Ax2 pivot to chair  Safety: Does not utilize call light. Bed alarm in place d/t confusion. Room close to nurse's station w/ door open. ID band on. Non-skid socks on. Side rails up x2. Free from falls or injuries this shift. Hourly checks complete     Comments: Pt slept well in between cares. Repositioned q2H. Tylenol given prior to bed. Cooperative with cares. Tolerated swallowing pills whole with apple juice. AM K+: 3.3; ordered 20 mEq replacement.    Face to face report given with opportunity to observe patient.    Report given to MERISSA Esteban RN   2/19/2023  6:55 AM      "

## 2023-02-19 NOTE — PROGRESS NOTES
"[facility]    Hospitalist Progress Note    Date of Service (when I saw the patient): 02/19/2023    Assessment & Plan     Acute cystitis without hematuria: Causing acute encephalopathy/delirium. Started on rocephin, continue for 7 day course.      Alzheimer's Dementia: With acute encephalopathy, delirium. She does have a history of behavioral problems but has been calm and cooperative here. She is not on dementia medications on her home medication list, prn zyprexa and prn haldol ordered     Essential hypertension: Restart Cozaar,srop amlodapine.     Hypokalemia: On ursing potassium replacement protocol, will restart home dose 10 meq daily also.      GERD:Continue PPIs     Dehydration:Encourage PO intake, Will give 500ml bolus as creatinine increasing and she is not taking much in orally.       # Hypokalemia: Lowest K = 3.3 mmol/L in last 2 days, will replace as needed                 # Cachexia: Estimated body mass index is 17.73 kg/m  as calculated from the following:    Height as of this encounter: 1.6 m (5' 3\").    Weight as of this encounter: 45.4 kg (100 lb 1.4 oz).   # Moderate Malnutrition: based on nutrition assessment        DVT Prophylaxis: Enoxaparin (Lovenox) SQ  Code Status: No CPR- Do NOT Intubate    Disposition: Expected discharge in 1-2 days once placement found.    Monika Pradhan, CNP    Interval History   Confused, though follows commands easily, not agitated and does not appear to be anxious. Denies chest pain, abdominal pain, nausea.     -Data reviewed today: I reviewed all new labs and imaging results over the last 24 hours.     Physical Exam   Temp: 97.8  F (36.6  C) Temp src: Tympanic BP: 126/52 Pulse: 77   Resp: 16 SpO2: 96 % O2 Device: None (Room air)    Vitals:    02/14/23 1917   Weight: 45.4 kg (100 lb 1.4 oz)     Vital Signs with Ranges  Temp:  [97.8  F (36.6  C)-98.4  F (36.9  C)] 97.8  F (36.6  C)  Pulse:  [77-83] 77  Resp:  [16] 16  BP: (110-126)/(50-58) 126/52  SpO2:  " [94 %-96 %] 96 %  I/O last 3 completed shifts:  In: 1160 [P.O.:1110; I.V.:50]  Out: -     Peripheral IV 02/16/23 Right;Dorsal Hand (Active)   Site Assessment WDL 02/19/23 0900   Line Status Saline locked 02/19/23 0900   Phlebitis Scale 0-->no symptoms 02/19/23 0900   Infiltration Scale 0 02/19/23 0900   Infiltration Site Treatment Method  None 02/17/23 2157   If infiltrated, was a vesicant infusing? No 02/17/23 0423   Number of days: 3     Line/device assessment(s) completed for medical necessity    Constitutional: Awake,alert, nontoxic appearing  Respiratory: Clear bilaterally, diminished to the bases, no wheezes, rhonchi or crackles.   Cardiovascular: HRR, no murmurs, rubs, thrills.   GI: Soft,nontender, bowel sounds are positive.   Skin/Integumen: No rashes, open areas or bruising.        Medications       sodium chloride 0.9%  500 mL Intravenous Once     aspirin  81 mg Oral Daily     cefTRIAXone  1 g Intravenous Q24H     enoxaparin ANTICOAGULANT  30 mg Subcutaneous Q24H     [START ON 2/20/2023] losartan  50 mg Oral Daily     melatonin  1 mg Oral At Bedtime     metoprolol tartrate  25 mg Oral BID     pantoprazole  40 mg Oral QAM AC     [START ON 2/20/2023] potassium chloride ER  10 mEq Oral Daily     sodium chloride (PF)  3 mL Intracatheter Q8H     [START ON 2/20/2023] venlafaxine  37.5 mg Oral Daily with breakfast     [Held by provider] venlafaxine  75 mg Oral Daily with breakfast       Data   Recent Labs   Lab 02/19/23  1109 02/19/23  0526 02/18/23  0620 02/17/23  1147   WBC  --  8.7 10.9 11.4*   HGB  --  9.9* 10.1* 11.2*   MCV  --  96 97 97   PLT  --  298 294 319   NA  --  137 139 139   POTASSIUM 4.5 3.3* 3.8 3.4   CHLORIDE  --  101 104 104   CO2  --  24 24 23   BUN  --  31.1* 31.7* 25.7*   CR  --  1.13* 1.09* 1.06*   ANIONGAP  --  12 11 12   REYMUNDO  --  9.1 8.8 9.2   GLC  --  103* 132* 159*       No results found for this or any previous visit (from the past 24 hour(s)).

## 2023-02-19 NOTE — PLAN OF CARE
Goal Outcome Evaluation:         Pt disoriented per baseline. VS stable per manual BP, afebrile. Prn tylenol given for comfort, thumbs swollen red-provider aware. On IV rocephin, received 500ml bolus per order this afternoon. Fair-poor appetite, needs assist eating. Up in chair most of day, turn and repo with cushion in place. Ax2. Incontinent. Plan for placement in the next couple days.    Face to face report given with opportunity to observe patient.    Report given to MERISSA Abraham RN   2/19/2023  6:55 PM

## 2023-02-20 ENCOUNTER — APPOINTMENT (OUTPATIENT)
Dept: PHYSICAL THERAPY | Facility: HOSPITAL | Age: 88
DRG: 690 | End: 2023-02-20
Attending: INTERNAL MEDICINE
Payer: MEDICARE

## 2023-02-20 LAB
ANION GAP SERPL CALCULATED.3IONS-SCNC: 9 MMOL/L (ref 7–15)
BUN SERPL-MCNC: 33.6 MG/DL (ref 8–23)
CALCIUM SERPL-MCNC: 9 MG/DL (ref 8.2–9.6)
CHLORIDE SERPL-SCNC: 104 MMOL/L (ref 98–107)
CREAT SERPL-MCNC: 1.08 MG/DL (ref 0.51–0.95)
DEPRECATED HCO3 PLAS-SCNC: 24 MMOL/L (ref 22–29)
ERYTHROCYTE [DISTWIDTH] IN BLOOD BY AUTOMATED COUNT: 15.9 % (ref 10–15)
GFR SERPL CREATININE-BSD FRML MDRD: 48 ML/MIN/1.73M2
GLUCOSE SERPL-MCNC: 98 MG/DL (ref 70–99)
HCT VFR BLD AUTO: 30.4 % (ref 35–47)
HGB BLD-MCNC: 10 G/DL (ref 11.7–15.7)
LACTATE SERPL-SCNC: 0.8 MMOL/L (ref 0.7–2)
MCH RBC QN AUTO: 31.9 PG (ref 26.5–33)
MCHC RBC AUTO-ENTMCNC: 32.9 G/DL (ref 31.5–36.5)
MCV RBC AUTO: 97 FL (ref 78–100)
PLATELET # BLD AUTO: 324 10E3/UL (ref 150–450)
POTASSIUM SERPL-SCNC: 4.6 MMOL/L (ref 3.4–5.3)
RBC # BLD AUTO: 3.13 10E6/UL (ref 3.8–5.2)
SODIUM SERPL-SCNC: 137 MMOL/L (ref 136–145)
WBC # BLD AUTO: 9.7 10E3/UL (ref 4–11)

## 2023-02-20 PROCEDURE — 36415 COLL VENOUS BLD VENIPUNCTURE: CPT | Performed by: HOSPITALIST

## 2023-02-20 PROCEDURE — 97110 THERAPEUTIC EXERCISES: CPT | Mod: GP | Performed by: PHYSICAL THERAPIST

## 2023-02-20 PROCEDURE — 250N000013 HC RX MED GY IP 250 OP 250 PS 637: Performed by: HOSPITALIST

## 2023-02-20 PROCEDURE — 250N000011 HC RX IP 250 OP 636: Performed by: INTERNAL MEDICINE

## 2023-02-20 PROCEDURE — 83605 ASSAY OF LACTIC ACID: CPT | Performed by: HOSPITALIST

## 2023-02-20 PROCEDURE — 36415 COLL VENOUS BLD VENIPUNCTURE: CPT | Performed by: INTERNAL MEDICINE

## 2023-02-20 PROCEDURE — 80048 BASIC METABOLIC PNL TOTAL CA: CPT | Performed by: INTERNAL MEDICINE

## 2023-02-20 PROCEDURE — 97530 THERAPEUTIC ACTIVITIES: CPT | Mod: GP | Performed by: PHYSICAL THERAPIST

## 2023-02-20 PROCEDURE — 250N000013 HC RX MED GY IP 250 OP 250 PS 637: Performed by: INTERNAL MEDICINE

## 2023-02-20 PROCEDURE — 85027 COMPLETE CBC AUTOMATED: CPT | Performed by: INTERNAL MEDICINE

## 2023-02-20 PROCEDURE — 250N000013 HC RX MED GY IP 250 OP 250 PS 637: Performed by: NURSE PRACTITIONER

## 2023-02-20 PROCEDURE — 120N000001 HC R&B MED SURG/OB

## 2023-02-20 PROCEDURE — 99232 SBSQ HOSP IP/OBS MODERATE 35: CPT | Performed by: NURSE PRACTITIONER

## 2023-02-20 RX ORDER — CEFDINIR 300 MG/1
300 CAPSULE ORAL DAILY
DISCHARGE
Start: 2023-02-21 | End: 2023-02-24

## 2023-02-20 RX ORDER — CEFDINIR 300 MG/1
300 CAPSULE ORAL DAILY
Status: DISCONTINUED | OUTPATIENT
Start: 2023-02-20 | End: 2023-02-21 | Stop reason: HOSPADM

## 2023-02-20 RX ADMIN — ACETAMINOPHEN 650 MG: 325 TABLET, FILM COATED ORAL at 16:40

## 2023-02-20 RX ADMIN — CEFDINIR 300 MG: 300 CAPSULE ORAL at 14:44

## 2023-02-20 RX ADMIN — LOSARTAN POTASSIUM 50 MG: 25 TABLET, FILM COATED ORAL at 09:54

## 2023-02-20 RX ADMIN — PANTOPRAZOLE SODIUM 40 MG: 40 TABLET, DELAYED RELEASE ORAL at 10:05

## 2023-02-20 RX ADMIN — ENOXAPARIN SODIUM 30 MG: 30 INJECTION SUBCUTANEOUS at 22:56

## 2023-02-20 RX ADMIN — VENLAFAXINE HYDROCHLORIDE 37.5 MG: 37.5 CAPSULE, EXTENDED RELEASE ORAL at 10:04

## 2023-02-20 RX ADMIN — METOPROLOL TARTRATE 25 MG: 25 TABLET, FILM COATED ORAL at 09:55

## 2023-02-20 RX ADMIN — ASPIRIN 81 MG 81 MG: 81 TABLET ORAL at 09:54

## 2023-02-20 RX ADMIN — HALOPERIDOL LACTATE 1 MG: 5 INJECTION, SOLUTION INTRAMUSCULAR at 12:14

## 2023-02-20 ASSESSMENT — ACTIVITIES OF DAILY LIVING (ADL)
ADLS_ACUITY_SCORE: 65
ADLS_ACUITY_SCORE: 63
ADLS_ACUITY_SCORE: 65
ADLS_ACUITY_SCORE: 65
ADLS_ACUITY_SCORE: 63
ADLS_ACUITY_SCORE: 61
ADLS_ACUITY_SCORE: 63
ADLS_ACUITY_SCORE: 61
ADLS_ACUITY_SCORE: 65
ADLS_ACUITY_SCORE: 63

## 2023-02-20 NOTE — PHARMACY
Pharmacy Antimicrobial Stewardship Assessment     Current Antimicrobial Therapy:  Anti-infectives (From now, onward)    Start     Dose/Rate Route Frequency Ordered Stop    02/15/23 1600  cefTRIAXone in d5w (ROCEPHIN) intermittent infusion 1 g         1 g  over 30 Minutes Intravenous EVERY 24 HOURS 23 1917          Indication: UTI    Days of Therapy: 7     Pertinent Labs:  Recent Labs   Lab Test 23  0559 23  0526 23  0620   WBC 9.7 8.7 10.9     Recent Labs   Lab Test 23  0053 23  1412   LACT 0.8 0.9        Temperature:  Temp (24hrs), Av.3  F (36.8  C), Min:97.5  F (36.4  C), Max:98.8  F (37.1  C)    Culture Results:       Recommendations/Interventions:  On day-7. Consider stopping after today's dose    Susu Thompson [facility]  2023

## 2023-02-20 NOTE — PROGRESS NOTES
"[facility]    Hospitalist Progress Note    Date of Service (when I saw the patient): 02/20/2023    Assessment & Plan     Acute cystitis without hematuria: Causing acute encephalopathy/delirium. Started on rocephin, continue for 7 day course.      Alzheimer's Dementia: With acute encephalopathy, delirium. She does have a history of behavioral problems but has been calm and cooperative here. She is not on dementia medications on her home medication list, prn zyprexa and prn haldol ordered     Essential hypertension: Restart Cozaar,srop amlodapine.     Hypokalemia: On nursing potassium replacement protocol, will restart home dose 10 meq daily also.      GERD:Continue PPIs     Dehydration: Encourage PO intake, Will give 500ml bolus as creatinine increasing and she is not taking much in orally.   -2/20: Pushing fluids, renal function slightly improved today.       # Hypokalemia: Lowest K = 3.3 mmol/L in last 2 days, will replace as needed                 # Cachexia: Estimated body mass index is 17.73 kg/m  as calculated from the following:    Height as of this encounter: 1.6 m (5' 3\").    Weight as of this encounter: 45.4 kg (100 lb 1.4 oz).   # Moderate Malnutrition: based on nutrition assessment        DVT Prophylaxis: Enoxaparin (Lovenox) SQ  Code Status: No CPR- Do NOT Intubate    Disposition: Expected discharge in 1-2 days once placement found, medically stable for discharge today.    Monika Pradhan, CNP    Interval History   Confused, though follows commands easily, not agitated and does not appear to be anxious. Denies chest pain, abdominal pain, nausea.     -Data reviewed today: I reviewed all new labs and imaging results over the last 24 hours.     Physical Exam   Temp: 97.5  F (36.4  C) Temp src: Tympanic BP: 138/55 Pulse: 83   Resp: 16 SpO2: 95 % O2 Device: None (Room air)    Vitals:    02/14/23 1917   Weight: 45.4 kg (100 lb 1.4 oz)     Vital Signs with Ranges  Temp:  [97.5  F (36.4  C)-98.8  F " (37.1  C)] 97.5  F (36.4  C)  Pulse:  [77-91] 83  Resp:  [14-16] 16  BP: (120-138)/(48-55) 138/55  SpO2:  [95 %-96 %] 95 %  I/O last 3 completed shifts:  In: 1831 [P.O.:1680; I.V.:151]  Out: -     Peripheral IV 02/19/23 Right;Posterior Lower forearm (Active)   Site Assessment WDL 02/19/23 2200   Line Status Saline locked 02/19/23 2200   Phlebitis Scale 0-->no symptoms 02/19/23 2200   Infiltration Scale 0 02/19/23 2200   Infiltration Site Treatment Method  None 02/19/23 2200   Number of days: 1     Line/device assessment(s) completed for medical necessity    Constitutional: Awake,alert, nontoxic appearing  Respiratory: Clear bilaterally, diminished to the bases, no wheezes, rhonchi or crackles.   Cardiovascular: HRR, no murmurs, rubs, thrills.   GI: Soft,nontender, bowel sounds are positive.   Skin/Integumen: No rashes, open areas or bruising.        Medications       aspirin  81 mg Oral Daily     cefTRIAXone  1 g Intravenous Q24H     enoxaparin ANTICOAGULANT  30 mg Subcutaneous Q24H     losartan  50 mg Oral Daily     melatonin  1 mg Oral At Bedtime     metoprolol tartrate  25 mg Oral BID     pantoprazole  40 mg Oral QAM AC     potassium chloride ER  10 mEq Oral Daily     sodium chloride (PF)  3 mL Intracatheter Q8H     venlafaxine  37.5 mg Oral Daily with breakfast     [Held by provider] venlafaxine  75 mg Oral Daily with breakfast       Data   Recent Labs   Lab 02/20/23  0559 02/19/23  1109 02/19/23  0526 02/18/23  0620   WBC 9.7  --  8.7 10.9   HGB 10.0*  --  9.9* 10.1*   MCV 97  --  96 97     --  298 294     --  137 139   POTASSIUM 4.6 4.5 3.3* 3.8   CHLORIDE 104  --  101 104   CO2 24  --  24 24   BUN 33.6*  --  31.1* 31.7*   CR 1.08*  --  1.13* 1.09*   ANIONGAP 9  --  12 11   REYMUNDO 9.0  --  9.1 8.8   GLC 98  --  103* 132*       No results found for this or any previous visit (from the past 24 hour(s)).

## 2023-02-20 NOTE — PROGRESS NOTES
Spoke with Nikkie at [facility], they have accepted Virginia for admission.  She later called advising they needed to sort out finances with Lyudmila PANDEY after speaking with son, Krystian.  Awaiting call back.  They do request home care orders and a wheelchair order.  [facility] able to provide transportation upon discharge.  Discharge tomorrow still anticipated.

## 2023-02-20 NOTE — PROVIDER NOTIFICATION
Nurse text paged Dr. Frye informing him of sepsis BPA so nurse ordered a lactic acid.    Leigh Ventura RN on 2/20/2023 at 12:45 AM

## 2023-02-20 NOTE — PLAN OF CARE
"Patient tearful & crying upon nurse entering her room tonight. Asked patient how nurse could help her, what was bothering her and what we could do for her. Patient was unable to logically answer nurse's questions and kept saying things like \" I don't know\" and \"You're not helpful\" and \"Nobody's helping me\". Nurse kept attempting to console patient and tend to her needs but it was unsuccessful.     Patient refused VS being measured. She refused all medication interventions and she refused to allow nurse to complete a full assessment (unable to auscultate bowel sounds).     Leigh Ventura RN on 2/19/2023 at 10:22 PM      "

## 2023-02-20 NOTE — PROGRESS NOTES
"CLINICAL NUTRITION SERVICES  -  ASSESSMENT NOTE    Virginia Leone     89 yof admitted for acute cystitis. Medical hx includes HTN, GERD, dementia. Confused. Intake is variable, overall inadequate. No new weights to assess    Diet Order: Regular  Intake: 15 meals with 0-100% intake    Height: 5' 3\"  Weight: 100 lbs 1.42 oz  Body mass index is 17.73 kg/m .  Weight Status:  Underweight BMI <18.5  Weight History:   Wt Readings from Last 10 Encounters:   02/14/23 45.4 kg (100 lb 1.4 oz)   02/14/23 47.6 kg (105 lb)   02/27/19 57.2 kg (126 lb 3.2 oz)   06/15/18 54.4 kg (120 lb)   11/03/17 54.9 kg (121 lb)   07/12/16 57.2 kg (126 lb)   04/22/15 53.1 kg (117 lb)   11/05/14 54.9 kg (121 lb)   09/11/14 55.3 kg (122 lb)   08/19/14 55.3 kg (122 lb)      Weight used to calculate needs: 55.5 kg ibw  Estimated Energy Needs: 5129-5301 kcals (25-30 Kcal/Kg)   Estimated Protein Needs: 55-85 grams protein (1-1.5 g pro/Kg)     MALNUTRITION:  % Weight Loss: unable to assess  % Intake:  Suspected to meet criteria  Muscle and Subcutaneous Fat Loss:  Moderate      Malnutrition Diagnosis: Suspect malnutrition with low bodyweight, loss of muscle and subcutaneous fat. Suspect poor intake.  In Context of:  Chronic illness or disease    NUTRITION RECOMMENDATIONS  - Offer Ensure with/between meals  - Encourage frequent meals and snacks     MONITORING AND EVALUATION:  RD will monitor diet order, intake, weight, labs     "

## 2023-02-20 NOTE — PROGRESS NOTES
"   02/20/23 3043   Appointment Info   Signing Clinician's Name / Credentials (PT) Valarie Monteiro DPT   Interventions   Interventions Quick Adds Therapeutic Activity;Therapeutic Procedure   Therapeutic Procedure/Exercise   Ther. Procedure: strength, endurance, ROM, flexibillity Minutes (77566) 9   Symptoms Noted During/After Treatment increased pain   Treatment Detail/Skilled Intervention While in standing did provide cues to work on marching in place, pt was able to complete a few reps each leg before stating \"that's enough of that\" and returned to sitting.  Pt participated in seated therex x15-20 reps each: LAQ with visual target, hip flex, ankle pumps.  Pt needs constant and repeated cues to stay on task with exercises, is very easily distracted with environment.   Therapeutic Activity   Therapeutic Activities: dynamic activities to improve functional performance Minutes (93540) 12   Symptoms Noted During/After Treatment Fatigue   Treatment Detail/Skilled Intervention Upon entering room, 3 family members present, pt with legs over siderail trying to get OOB.  Provided redirection to patient to wait until socks were in place, did ask family to step out due to lack of space.  Pt transferred sup>sit SBA to EOB today.  Once seated at EOB, this writer donned socks for her.  Pt transferred sit>stand from EOB with cues and mod A, retropulsion noted.  Pt ambulted 3-4 steps forward with FWW and mod A, pt having difficulty advancing L LE but not complaining of pain, pt took steps back to chair and said she was tired.  Provided rest break then transferred sit>stand mod A up to FWW, with significant cues and physical guidance, pt was able to ambulate 5' from bed>chair with FWW mod A, again has difficulty advancing L LE to take steps.  Pt able to scoot back into chair with good UE strength noted.  Pt left sitting in chair with call light in reach and clip alarm on.   PT Discharge Planning   PT Plan Progress functional mobility " as able   PT Discharge Recommendation (DC Rec) Transitional Care Facility;Long term care facility   PT Rationale for DC Rec Still requiring heavy assist, does follow some commends to participate with PT but needs significant amount of cues to stay on task.  Recommend short term rehab but long term care will likely be needed upon discharge from rehab due to cognition   PT Brief overview of current status Upon entering room, 3 family members present, pt with legs over siderail trying to get OOB.  Provided redirection to patient to wait until socks were in place, did ask family to step out due to lack of space.  Pt transferred sup>sit SBA to EOB today.  Once seated at EOB, this writer donned socks for her.  Pt transferred sit>stand from EOB with cues and mod A, retropulsion noted.  Pt ambulted 3-4 steps forward with FWW and mod A, pt having difficulty advancing L LE but not complaining of pain, pt took steps back to chair and said she was tired.  Provided rest break then transferred sit>stand mod A up to FWW, with significant cues and physical guidance, pt was able to ambulate 5' from bed>chair with FWW mod A, again has difficulty advancing L LE to take steps.  Pt able to scoot back into chair with good UE strength noted.  Pt left sitting in chair with call light in reach and clip alarm on.   Total Session Time   Timed Code Treatment Minutes 21   Total Session Time (sum of timed and untimed services) 21

## 2023-02-20 NOTE — PLAN OF CARE
"Reason for hospital stay: UTI, delirium/dementia     Most recent vitals: BP (!) 120/48 (BP Location: Right arm)   Pulse 84   Temp 98.8  F (37.1  C) (Tympanic)   Resp 14   Ht 1.6 m (5' 3\")   Wt 45.4 kg (100 lb 1.4 oz)   SpO2 95%   BMI 17.73 kg/m       Pain Management: crying & tearful beginning of shift but denied having pain. Offered pt PRN Tylenol to help with arthritic pain but patient refused  LOC: A&O x0  Cardiac: HRR, tachycardic d/t agitation - agreed to taking scheduled metoprolol later in the night  Respiratory: LS clear & equal; diminished in bases - nurse was able to auscultate LS when pt was asleep otherwise she refused assessment.  Maintained on RA overnight  GI: Incontinent. No BM this shift.   : Incontinent  Skin Issues: Skin pale in color. Scattered bruising on forearms from IV & lab draws. Redness blanchable on coccyx. No open areas noted     IVF: 22G right posterior forearm secured w/ coban  ABX: IV Rocephin q24H     Nutrition: regular diet, thin liquids  ADL's: Dependent  Ambulation: TR q2H, Ax2 pivot to chair  Safety: Does not utilize call light. Bed alarm in place d/t confusion. Room close to nurse's station w/ door open. ID band on. Non-skid socks on. Side rails up x2. Free from falls or injuries this shift. Hourly checks complete     Comments: Unable to measure full set of VS d/t pt agitation & refusal. Tearful/crying beginning of the shift wanting her \"little dog\" and unable to console. Sepsis BPA fired d/t increase in HR & change in WBC so Lactate was drawn: 0.8 which, unfortunately, agitated patient even more. She allowed for repositioning & brief changes with certain staff members but still remained guarded. She's had little to no sleep in between cares; often fidgeting with her blankets and stripping her gown off. Resumed PTA med 10 mEq potassium daily on 2/19 d/t acute hypokalemia needing replacement. Continues on RN managed protocol. 2/21 AM K+ level: 4.6; no replacement " required. Plan for SNF discharge when medically stable.     Face to face report given with opportunity to observe patient.    Report given to MERISSA Matson RN   2/20/2023  7:03 AM

## 2023-02-21 VITALS
DIASTOLIC BLOOD PRESSURE: 91 MMHG | TEMPERATURE: 97.8 F | SYSTOLIC BLOOD PRESSURE: 142 MMHG | WEIGHT: 100.09 LBS | BODY MASS INDEX: 17.73 KG/M2 | HEART RATE: 78 BPM | RESPIRATION RATE: 18 BRPM | HEIGHT: 63 IN | OXYGEN SATURATION: 96 %

## 2023-02-21 LAB
ANION GAP SERPL CALCULATED.3IONS-SCNC: 10 MMOL/L (ref 7–15)
BUN SERPL-MCNC: 23.9 MG/DL (ref 8–23)
CALCIUM SERPL-MCNC: 9.3 MG/DL (ref 8.2–9.6)
CHLORIDE SERPL-SCNC: 104 MMOL/L (ref 98–107)
CREAT SERPL-MCNC: 1.01 MG/DL (ref 0.51–0.95)
DEPRECATED HCO3 PLAS-SCNC: 27 MMOL/L (ref 22–29)
ERYTHROCYTE [DISTWIDTH] IN BLOOD BY AUTOMATED COUNT: 15.7 % (ref 10–15)
GFR SERPL CREATININE-BSD FRML MDRD: 52 ML/MIN/1.73M2
GLUCOSE SERPL-MCNC: 97 MG/DL (ref 70–99)
HCT VFR BLD AUTO: 30.8 % (ref 35–47)
HGB BLD-MCNC: 10.1 G/DL (ref 11.7–15.7)
HOLD SPECIMEN: NORMAL
LACTATE SERPL-SCNC: 1 MMOL/L (ref 0.7–2)
MCH RBC QN AUTO: 31.7 PG (ref 26.5–33)
MCHC RBC AUTO-ENTMCNC: 32.8 G/DL (ref 31.5–36.5)
MCV RBC AUTO: 97 FL (ref 78–100)
PLATELET # BLD AUTO: 366 10E3/UL (ref 150–450)
POTASSIUM SERPL-SCNC: 4.5 MMOL/L (ref 3.4–5.3)
RBC # BLD AUTO: 3.19 10E6/UL (ref 3.8–5.2)
SODIUM SERPL-SCNC: 141 MMOL/L (ref 136–145)
WBC # BLD AUTO: 9.6 10E3/UL (ref 4–11)

## 2023-02-21 PROCEDURE — 250N000013 HC RX MED GY IP 250 OP 250 PS 637: Performed by: INTERNAL MEDICINE

## 2023-02-21 PROCEDURE — 99239 HOSP IP/OBS DSCHRG MGMT >30: CPT | Performed by: NURSE PRACTITIONER

## 2023-02-21 PROCEDURE — 85027 COMPLETE CBC AUTOMATED: CPT | Performed by: INTERNAL MEDICINE

## 2023-02-21 PROCEDURE — 36415 COLL VENOUS BLD VENIPUNCTURE: CPT | Performed by: INTERNAL MEDICINE

## 2023-02-21 PROCEDURE — 36415 COLL VENOUS BLD VENIPUNCTURE: CPT | Performed by: NURSE PRACTITIONER

## 2023-02-21 PROCEDURE — 83605 ASSAY OF LACTIC ACID: CPT | Performed by: NURSE PRACTITIONER

## 2023-02-21 PROCEDURE — 250N000013 HC RX MED GY IP 250 OP 250 PS 637: Performed by: NURSE PRACTITIONER

## 2023-02-21 PROCEDURE — 250N000013 HC RX MED GY IP 250 OP 250 PS 637: Performed by: HOSPITALIST

## 2023-02-21 PROCEDURE — 80048 BASIC METABOLIC PNL TOTAL CA: CPT | Performed by: INTERNAL MEDICINE

## 2023-02-21 RX ADMIN — LOSARTAN POTASSIUM 50 MG: 25 TABLET, FILM COATED ORAL at 10:38

## 2023-02-21 RX ADMIN — Medication 1 MG: at 00:12

## 2023-02-21 RX ADMIN — ACETAMINOPHEN 650 MG: 325 TABLET, FILM COATED ORAL at 00:12

## 2023-02-21 ASSESSMENT — ACTIVITIES OF DAILY LIVING (ADL)
ADLS_ACUITY_SCORE: 65

## 2023-02-21 NOTE — PLAN OF CARE
"Most recent vitals: /76 (BP Location: Left arm, Patient Position: Semi-Purvis's, Cuff Size: Adult Small)   Pulse 72   Temp 98.3  F (36.8  C) (Tympanic)   Resp 18   Ht 1.6 m (5' 3\")   Wt 45.4 kg (100 lb 1.4 oz)   SpO2 94%   BMI 17.73 kg/m      Goal Outcome Evaluation:     Alert, disoriented x 4, VSS, afebrile, 7/10 pain per PAINAD scale. Pt expressed agitation/combative,m and restless, tylenol & melatonin, settled down after medication and slept remainder of night with bouts of coughing and talking in their sleep.    Face to face report given with opportunity to observe patient.    Report given to MERISSA Hyman RN   2/21/2023  7:33 AM        "

## 2023-02-21 NOTE — PLAN OF CARE
Goal Outcome Evaluation:             Chart entered to clarify and fax discharge info to Shante Higgins in [place]

## 2023-02-21 NOTE — PROGRESS NOTES
[facility]  [place]

## 2023-02-21 NOTE — PLAN OF CARE
Pt alert, disorientated to time, date, and place. VSS. Pt afebrile, remains on room air.   Pt refuses morning scheduled medications after several attempts. Pt complaints of leg pain when repositioned this shift. Pt refuses PRN tylenol when offered this shift.   Pt incontinent of bladder this shift. Pt assist of 2 with transfers this shift.     Patient discharged at 2:35 PM via wheel chair accompanied by staff. Prescriptions sent to patients preferred pharmacy. All belongings sent with patient.     Discharge instructions sent with patient. Listed belongings gathered and returned to patient.     Patient discharged to [facility].   Report called to [facility]. No answer, unable to leave message. Will attempt again.    Surgical Patient   Surgical Procedures during stay:  N/A   Did patient receive discharge instruction on wound care and recognition of infection symptoms? Yes    MISC  Follow up appointment made:   Pt will follow up with Primary   Home medications returned to patient: N/A  Patient reports pain was well managed at discharge: Yes

## 2023-02-21 NOTE — PROGRESS NOTES
Spoke with staff at [facility], they indicate Marina is in a meeting and will relay message to return writer's call.

## 2023-02-21 NOTE — PLAN OF CARE
Pt alert, and orientated to only self this shift. Pt afebrile, and remains on room air. VSS. Lung sounds diminished. Peripheral IV leaking this shift, and removed. Ok from provider for No IV access as anticipated discharge tomorrow 2/21/23.   Pt denies pain this shift, but verbal complaints while staff moving lower extremities. Pt receives PRN tylenol x 1. Pt incontinent of bowel and bladder this shift.   Pt extensive assist of two pivoting from chair, bed, and commode this shift.   Pt needs reminders to call for staff before attempting to get up by self.   Alarms activated and audible. Pt free from falls this shift.     Face to face report given with opportunity to observe patient.    Report given to MERISSA Crowley.     Pia Ureña RN   2/20/2023  7:54 PM

## 2023-02-21 NOTE — PROGRESS NOTES
Name: Virginia Leone    MRN#: [id number]    Reason for Hospitalization: UTI (urinary tract infection) [N39.0]    NORMA: low    Discharge Date: 2/21/2023    Patient / Family response to discharge plan: agreeable    Follow-Up Appt: No future appointments.    Other Providers (Care Coordinator, County Services, PCA services etc): Yes: orders sent to [facility] and [facility]- received message they would be able to provide services for [place].     Discharge Disposition: assisted living- [facility] via staff, Marina at 1400    ELZIA Levy

## 2023-02-21 NOTE — DISCHARGE SUMMARY
"[facility]    Discharge Summary  Hospitalist    Date of Admission:  2/14/2023  Date of Discharge:  2/21/2023  2:32 PM  Discharging Provider: Monika Pradhan CNP  Date of Service (when I saw the patient): 2/21/23    Discharge Diagnoses     Principal Problem:    Acute cystitis without hematuria  Active Problems:    Hypertension    Hypokalemia    Chronic kidney disease, stage 3 (H)    Alzheimer's dementia (H)      History of Present Illness   From admission: [place] Leone is a 89 year old female who lives with her daughter and carries past medical history significant for hypertension, dementia, GERD, iron deficiency, who presents to [facility] with altered mental status and diarrhea.  She was also weak and was not able to get out of bed for a few days.  No fevers, pain, nausea vomiting documented.  Review of system is very limited patient's mental status which is severe dementia.  No family members available to help to obtain review of system. To all my questions she was answering \"please help me\". Oriented x 0  Granted task at hospital emergency room work-up was positive for mild leukocytosis, mild hypokalemia, abnormal UA stable creatinine and lactic acid 2.0.  Treatment provided [facility]'s emergency room: Rocephin was given.  Blood cultures obtained before starting antibiotics.    Hospital Course     Acute cystitis without hematuria: Causing acute encephalopathy/delirium. Started on rocephin, continue cephalosporin for 7 day course.      Alzheimer's Dementia: With acute encephalopathy, delirium. She does have a history of behavioral problems but has been calm and cooperative here. She is not on dementia medications on her home medication list.     Essential hypertension: Restart Cozaar,srop amlodapine.     Hypokalemia: On nursing potassium replacement protocol on arrival,then restarted home dose 10 meq daily also.      GERD:Continue PPIs     Dehydration: Encourage PO intake, Will " "give 500ml bolus as creatinine increasing and she is not taking much in orally.   -2/20: Pushing fluids, renal function slightly improved today.   -2/21: stable, continue to follow as an outpatient as she also has some malnutrition.         # Hypokalemia: Lowest K = 3.3 mmol/L in last 2 days, will replace as needed                 # Cachexia: Estimated body mass index is 17.73 kg/m  as calculated from the following:    Height as of this encounter: 1.6 m (5' 3\").    Weight as of this encounter: 45.4 kg (100 lb 1.4 oz).   # Moderate Malnutrition: based on nutrition assessment        Monika Pradhan CNP      Pending Results     Unresulted Labs Ordered in the Past 30 Days of this Admission     No orders found from 1/15/2023 to 2/15/2023.          Code Status   DNR / DNI       Primary Care Physician   Pamella Hu       Discharge Disposition   Discharged to nursing home  Condition at discharge: Stable    Consultations This Hospital Stay   PHYSICAL THERAPY ADULT IP CONSULT  OCCUPATIONAL THERAPY ADULT IP CONSULT  PHYSICAL THERAPY ADULT IP CONSULT  OCCUPATIONAL THERAPY ADULT IP CONSULT    Time Spent on this Encounter   I, Monika Pradhan NP, personally saw the patient today and spent greater than 30 minutes discharging this patient.    Discharge Orders      Home Care Referral      Reason for your hospital stay    Acute cystitis     Follow-up and recommended labs and tests     Follow up with primary care provider, PAMELLA PARHAM, within 7 days for hospital follow- up.  No follow up labs or test are needed.     Activity    Your activity upon discharge: activity as tolerated     Wheelchair    Wheelchair Documentation:   Describe the reason for need to support medical necessity: unable to ambulate without assist of 2 people.   1. The patient has mobility limitations that impairs their ability to participate in one or more mobility related activities: Toileting, Feeding, Grooming, and Bathing.  2. " The patient's mobility limitations cannot be safely resolved by using a cane/walker: Yes  3. The patients home has adequate access to use a manual wheelchair: Yes  4. The use of a manual wheelchair on a regular basis will improve the patients ability to participate in mobility related ADL's at home: Yes  5. The patient is willing to use a manual wheelchair at home: Yes  6. The patient has adequate upper body strength and the mental capability to safely use a manual wheelchair and/or has a caregiver that is able to assist: Yes  7. Does the patient have a lower extremity injury or edema?: No    Reason for Type of Wheelchair:     **Use of a manual wheelchair will significantly improve the patient's ability to participate in MRADLs and the patient will use it on a regular basis in the home. The patient has not expressed an unwillingness to use the manual wheelchair that is provided in the home.**    I, the undersigned, certify that the above prescribed supplies are medically necessary for this patient and is both reasonable and necessary in reference to accepted standards of medical and necessary in reference to accepted standards of medical practice in the treatment of this patient's condition and is not prescribed as a convenience.     Diet    Follow this diet upon discharge: Orders Placed This Encounter      Advance Diet as Tolerated: Regular Diet Adult     Discharge Medications   Current Discharge Medication List      START taking these medications    Details   cefdinir (OMNICEF) 300 MG capsule Take 1 capsule (300 mg) by mouth daily for 3 days    Associated Diagnoses: Acute cystitis without hematuria         CONTINUE these medications which have NOT CHANGED    Details   aspirin 81 MG tablet Take 81 mg by mouth daily with food      ferrous gluconate (FERGON) 324 (38 Fe) MG tablet Take 1 tablet (324 mg) by mouth every other day  Qty: 45 tablet, Refills: 3    Associated Diagnoses: Iron deficiency anemia, unspecified  iron deficiency anemia type      ibuprofen (ADVIL/MOTRIN) 200 MG tablet Take 200 mg by mouth 2 times daily      losartan (COZAAR) 50 MG tablet Take 1 tablet (50 mg) by mouth daily  Qty: 90 tablet, Refills: 3    Associated Diagnoses: Essential hypertension      melatonin 3 MG tablet Take 3 mg by mouth nightly as needed for sleep      metoprolol tartrate (LOPRESSOR) 25 MG tablet Take 1 tablet (25 mg) by mouth 2 times daily  Qty: 180 tablet, Refills: 3    Associated Diagnoses: Essential hypertension      Multiple Vitamins-Minerals (MULTIVITAMIN ADULTS 50+) TABS Take 1 Dose by mouth every evening      omeprazole (PRILOSEC) 20 MG DR capsule Take 1 capsule (20 mg) by mouth daily  Qty: 90 capsule, Refills: 3    Associated Diagnoses: Gastroesophageal reflux disease without esophagitis      potassium chloride ER (KLOR-CON M) 10 MEQ CR tablet Take 1 tablet (10 mEq) by mouth daily  Qty: 90 tablet, Refills: 3    Associated Diagnoses: Essential hypertension; Hypokalemia      venlafaxine (EFFEXOR XR) 75 MG 24 hr capsule TAKE 1 CAPSULE(75 MG) BY MOUTH DAILY  Qty: 90 capsule, Refills: 3    Associated Diagnoses: Depression with anxiety      Vitamin D, Cholecalciferol, 25 MCG (1000 UT) CAPS Take 1,000 Units by mouth every evening      zinc gluconate 50 MG tablet Take 50 mg by mouth every evening           Allergies   Allergies   Allergen Reactions     Penicillins Hives     Latex Rash     Data   Most Recent 3 CBC's:Recent Labs   Lab Test 02/21/23  0551 02/20/23  0559 02/19/23  0526   WBC 9.6 9.7 8.7   HGB 10.1* 10.0* 9.9*   MCV 97 97 96    324 298      Most Recent 3 BMP's:  Recent Labs   Lab Test 02/21/23  0551 02/20/23  0559 02/19/23  1109 02/19/23  0526    137  --  137   POTASSIUM 4.5 4.6 4.5 3.3*   CHLORIDE 104 104  --  101   CO2 27 24  --  24   BUN 23.9* 33.6*  --  31.1*   CR 1.01* 1.08*  --  1.13*   ANIONGAP 10 9  --  12   REYMUNDO 9.3 9.0  --  9.1   GLC 97 98  --  103*     Most Recent 2 LFT's:  Recent Labs   Lab Test  11/22/22  1549 07/12/16  1714   AST 23  --    ALT 18  --    ALKPHOS 118* 87   BILITOTAL 0.2 0.4     Most Recent INR's and Anticoagulation Dosing History:  Anticoagulation Dose History    There is no flowsheet data to display.       Most Recent 3 Troponin's:No lab results found.  Most Recent Cholesterol Panel:No lab results found.  Most Recent 6 Bacteria Isolates From Any Culture (See EPIC Reports for Culture Details):No lab results found.  Most Recent TSH, T4 and A1c Labs:No lab results found.  Results for orders placed or performed during the hospital encounter of 06/15/18   CT Head w/o Contrast    Narrative    PROCEDURE: CT HEAD W/O CONTRAST     HISTORY: Fall striking head on floor/chair leg.  Neck strain/pain.   Falls frequently.; .    COMPARISON: MR head 7/21/2016    TECHNIQUE:  Helical images of the head from the foramen magnum to the  vertex were obtained without contrast.    FINDINGS: The ventricles and sulci are prominent, compatible with  moderate, generalized volume loss. No acute intracranial hemorrhage,  mass effect, midline shift, hydrocephalus or basilar cystern  effacement are present.    The grey-white matter interface is preserved. Patchy and confluent  hypoattenuation within the supratentorial subcortical and  periventricular white matter reflects chronic microvascular ischemic  change.     The calvarium is intact. Advanced degenerative changes are seen at C1  on the right. There is intracranial atherosclerotic disease.      Impression    IMPRESSION: No acute intracranial hemorrhage. Unchanged CT appearance  of the brain.      TESSY MENDOZA MD   CT Cervical Spine w/o Contrast    Narrative    PROCEDURE: CT CERVICAL SPINE W/O CONTRAST     HISTORY: neck pain, Fall striking head on floor/chair leg.  Neck  strain/pain.  Falls frequently.; .    TECHNIQUE: Helical noncontrast CT images of the cervical spine.    COMPARISON: None.    FINDINGS:     No acute fracture is identified. The vertebral bodies  are normal in  height.     Advanced degenerative changes include gradual reversal of the cervical  lordosis, stepwise anterolisthesis of C2-C5 and again at C7-T1. There  is advanced disc height loss at C5-6 and C6-7. Diffuse facet and  uncovertebral hypertrophy is present. There are advanced C1-2  degenerative changes on the right.      Impression    IMPRESSION: No evidence of acute cervical spine fracture.    TESSY MENDOZA MD

## 2023-02-21 NOTE — DISCHARGE INSTRUCTIONS
YOU HAVE A FOLLOW UP APPOINTMENT WITH ANN-MARIE MOMIN AT 1020 ON Monday February 27TH IN [place] [phone]

## 2023-02-21 NOTE — PLAN OF CARE
Occupational Therapy Discharge Summary    Reason for therapy discharge:    Discharged to [facility] with home care.    Progress towards therapy goal(s). See goals on Care Plan in [facility] electronic health record for goal details.  Goals not met.  Barriers to achieving goals:   discharge from facility.    Therapy recommendation(s):    Continued therapy is recommended.  Rationale/Recommendations:  Pt may benefit from home OT if she is not back to her baseline functional status upon discharge from the hospital.

## 2023-03-02 ENCOUNTER — TELEPHONE (OUTPATIENT)
Dept: FAMILY MEDICINE | Facility: OTHER | Age: 88
End: 2023-03-02

## 2023-03-02 PROCEDURE — G0180 MD CERTIFICATION HHA PATIENT: HCPCS | Performed by: FAMILY MEDICINE

## 2023-03-02 NOTE — TELEPHONE ENCOUNTER
"Home Care regulation mandates that you are notified about drug discrepancies, interactions & contraindications. Response within a 24 hour timeframe is established by CMS as \"best practice\" for the delivery of home health care. Home Care is required to report if the 24 hour timeframe was met. The home health clinician will contact you again if this timeframe is not met or if the response does not address all concerns.      Situation:  You are being contacted for clarifying orders related to issues found during medication reconciliation.     Background:  The patient was admitted to [facility]. Upon admission, a med reconciliation was completed to identify any drug discrepancies, interactions or contraindications. Home Care's drug regime review has revealed significant medication issues.     Assessment:         Severe interactions:     Aspirin and ibuprofen  Potassium chloride and losartan    Recommendations:    Please evaluate this information and indicate below whether or not changes are required. A full copy of the patient's drug interaction/contraindications report is available upon request.      Provider response/orders as follows:      Thanks!    Araceli Crews RN on 3/2/2023 at 3:19 PM    "

## 2023-03-03 ENCOUNTER — TELEPHONE (OUTPATIENT)
Dept: FAMILY MEDICINE | Facility: OTHER | Age: 88
End: 2023-03-03
Payer: MEDICARE

## 2023-03-03 NOTE — TELEPHONE ENCOUNTER
Palak informed of Dr. Dallin Reed's response     Christelle Street CMA on 3/3/2023 at 11:00 AM

## 2023-03-03 NOTE — TELEPHONE ENCOUNTER
Request for Home Care Physical Therapy orders as follows:        Continuation frequency =   ___2___  x week x  ___4___ week(s)    Effective date = 3/6/23      Patient would also like a Home OT eval and treat week of 3/6/23      Therapist: Agustina Ruiz PT    Please respond with .HOMECAREAGREE, if you are in agreement. Please sign with your comments and signature, if you are not in agreement.

## 2023-03-08 ENCOUNTER — TELEPHONE (OUTPATIENT)
Dept: FAMILY MEDICINE | Facility: OTHER | Age: 88
End: 2023-03-08
Payer: MEDICARE

## 2023-03-08 DIAGNOSIS — G30.9 ALZHEIMER'S DISEASE (H): Primary | ICD-10-CM

## 2023-03-08 DIAGNOSIS — F02.80 ALZHEIMER'S DISEASE (H): Primary | ICD-10-CM

## 2023-03-08 NOTE — TELEPHONE ENCOUNTER
Dr Dallin Reed reviewed and completed the following home care form for [place] Duttonleticia on 3/8/23.   This covers the certification period effective 3/2/23 to 4/30/23.    Christelle Street CMA on 3/8/2023 at 2:11 PM

## 2023-03-30 ENCOUNTER — TELEPHONE (OUTPATIENT)
Dept: FAMILY MEDICINE | Facility: OTHER | Age: 88
End: 2023-03-30
Payer: MEDICARE

## 2023-03-30 NOTE — TELEPHONE ENCOUNTER
RONDA Kwong from Home Care informed of provider response     Christelle Street CMA on 3/30/2023 at 4:24 PM

## 2023-04-10 DIAGNOSIS — F02.80 ALZHEIMER'S DISEASE (H): Primary | ICD-10-CM

## 2023-04-10 DIAGNOSIS — G30.9 ALZHEIMER'S DISEASE (H): Primary | ICD-10-CM

## 2023-04-10 RX ORDER — FAMOTIDINE 20 MG
1000 TABLET ORAL EVERY EVENING
Qty: 90 CAPSULE | Refills: 1 | Status: SHIPPED | OUTPATIENT
Start: 2023-04-10 | End: 2023-08-19

## 2023-04-10 RX ORDER — ASPIRIN 81 MG/1
81 TABLET, CHEWABLE ORAL DAILY
Qty: 90 TABLET | Refills: 1 | Status: SHIPPED | OUTPATIENT
Start: 2023-04-10 | End: 2023-08-19

## 2023-04-10 RX ORDER — IBUPROFEN 200 MG
200 TABLET ORAL 2 TIMES DAILY
Qty: 180 TABLET | Refills: 1 | Status: SHIPPED | OUTPATIENT
Start: 2023-04-10 | End: 2023-04-13

## 2023-04-10 RX ORDER — PHENOL 1.4 %
1 AEROSOL, SPRAY (ML) MUCOUS MEMBRANE EVERY EVENING
Qty: 90 TABLET | Refills: 1 | Status: SHIPPED | OUTPATIENT
Start: 2023-04-10

## 2023-04-10 NOTE — TELEPHONE ENCOUNTER
Reason for call: Medication or medication refill    Name of medication requested: all of the medications, Losartan, Klor-Can, metoprolol, Venlafaxin, aspirin, acetaminophen, omeprazole     Are you out of the medication? yes    What pharmacy do you use[facility]    Preferred method for responding to this message: Telephone Call    Phone number patient can be reached at: Other phone number:  [phone]    If we cannot reach you directly, may we leave a detailed response at the number you provided? Yes

## 2023-04-10 NOTE — TELEPHONE ENCOUNTER
"Refer to note below.   Contacted [facility] and reviewed medications needed with Renetta. Informed pharmacy that K+, Losartan, FeSo4, Metoprolol, Omeprazole and Effexor has refills at [facility]. \"OK. I will call 's and get those refills\".     Renetta is requesting ASA 81 mg chewable tab-\"That is what we have been giving her\" Augusto'd up as requested by pharmacy,     Also requesting Vitamin D 1,000 international unit(s) and Ibuprofen, and MVI. Pulled in and augusto'd up for PCP's review and consideration.     Please note medications are listed as historical- Unsure of specific diagnosis to associate with the medication below     Disp Refills Start End IOANA   aspirin 81 MG tablet     --   Sig - Route: Take 81 mg by mouth daily with food - Oral   Class: Historical      Disp Refills Start End IOANA   Vitamin D, Cholecalciferol, 25 MCG (1000 UT) CAPS     --   Sig - Route: Take 1,000 Units by mouth every evening - Oral   Class: Historical      Disp Refills Start End IOANA   Multiple Vitamins-Minerals (MULTIVITAMIN ADULTS 50+) TABS     --   Sig - Route: Take 1 Dose by mouth every evening - Oral   Class: Historical      Disp Refills Start End IOANA   ibuprofen (ADVIL/MOTRIN) 200 MG tablet     No   Sig - Route: Take 200 mg by mouth 2 times daily - Oral   Class: Historical       Last Office Visit: 11/08/2022  Future Office visit:       Routing refill request to provider for review/approval.     Unable to complete prescription refill per RNMedication Refill Policy.................... Lyudmila Bowles RN ....................  4/10/2023   11:17 AM          "

## 2023-04-11 ENCOUNTER — TELEPHONE (OUTPATIENT)
Dept: FAMILY MEDICINE | Facility: OTHER | Age: 88
End: 2023-04-11
Payer: MEDICARE

## 2023-04-12 ASSESSMENT — PAIN SCALES - GENERAL: PAINLEVEL: NO PAIN (0)

## 2023-04-13 ENCOUNTER — TELEPHONE (OUTPATIENT)
Dept: FAMILY MEDICINE | Facility: OTHER | Age: 88
End: 2023-04-13

## 2023-04-13 ENCOUNTER — LAB (OUTPATIENT)
Dept: LAB | Facility: OTHER | Age: 88
End: 2023-04-13
Attending: NURSE PRACTITIONER
Payer: MEDICARE

## 2023-04-13 ENCOUNTER — NURSING HOME VISIT (OUTPATIENT)
Dept: GERIATRICS | Facility: OTHER | Age: 88
End: 2023-04-13
Attending: NURSE PRACTITIONER
Payer: MEDICARE

## 2023-04-13 VITALS
WEIGHT: 103 LBS | OXYGEN SATURATION: 93 % | RESPIRATION RATE: 12 BRPM | HEART RATE: 78 BPM | DIASTOLIC BLOOD PRESSURE: 62 MMHG | SYSTOLIC BLOOD PRESSURE: 100 MMHG | BODY MASS INDEX: 18.25 KG/M2 | TEMPERATURE: 98 F

## 2023-04-13 DIAGNOSIS — G30.9 ALZHEIMER'S DISEASE (H): ICD-10-CM

## 2023-04-13 DIAGNOSIS — Z74.09 LIMITED MOBILITY: ICD-10-CM

## 2023-04-13 DIAGNOSIS — Z78.9 LIVING IN ASSISTED LIVING: ICD-10-CM

## 2023-04-13 DIAGNOSIS — F02.80 ALZHEIMER'S DISEASE (H): ICD-10-CM

## 2023-04-13 DIAGNOSIS — D50.9 IRON DEFICIENCY ANEMIA, UNSPECIFIED IRON DEFICIENCY ANEMIA TYPE: ICD-10-CM

## 2023-04-13 DIAGNOSIS — Z91.89 AT RISK FOR DEHYDRATION DUE TO POOR FLUID INTAKE: ICD-10-CM

## 2023-04-13 DIAGNOSIS — Z79.1 NSAID LONG-TERM USE: ICD-10-CM

## 2023-04-13 DIAGNOSIS — Z78.9 LIVES IN ASSISTED LIVING FACILITY: ICD-10-CM

## 2023-04-13 DIAGNOSIS — R82.90 ABNORMAL URINE FINDINGS: ICD-10-CM

## 2023-04-13 DIAGNOSIS — E87.6 HYPOKALEMIA: ICD-10-CM

## 2023-04-13 DIAGNOSIS — E87.8 IMPAIRED HYDRATION: ICD-10-CM

## 2023-04-13 DIAGNOSIS — J06.9 RECENT URI: ICD-10-CM

## 2023-04-13 DIAGNOSIS — E87.1 HYPONATREMIA: ICD-10-CM

## 2023-04-13 DIAGNOSIS — N18.30 STAGE 3 CHRONIC KIDNEY DISEASE, UNSPECIFIED WHETHER STAGE 3A OR 3B CKD (H): Primary | ICD-10-CM

## 2023-04-13 DIAGNOSIS — I10 ESSENTIAL HYPERTENSION: ICD-10-CM

## 2023-04-13 DIAGNOSIS — J06.9 VIRAL UPPER RESPIRATORY ILLNESS: ICD-10-CM

## 2023-04-13 DIAGNOSIS — N18.30 STAGE 3 CHRONIC KIDNEY DISEASE, UNSPECIFIED WHETHER STAGE 3A OR 3B CKD (H): ICD-10-CM

## 2023-04-13 DIAGNOSIS — R94.4 ABNORMAL RENAL FUNCTION TEST: ICD-10-CM

## 2023-04-13 DIAGNOSIS — Z79.899 ENCOUNTER FOR MEDICATION REVIEW: Primary | ICD-10-CM

## 2023-04-13 LAB
ALBUMIN SERPL BCG-MCNC: 3.9 G/DL (ref 3.5–5.2)
ALBUMIN UR-MCNC: NEGATIVE MG/DL
ALP SERPL-CCNC: 108 U/L (ref 35–104)
ALT SERPL W P-5'-P-CCNC: 36 U/L (ref 10–35)
ANION GAP SERPL CALCULATED.3IONS-SCNC: 12 MMOL/L (ref 7–15)
APPEARANCE UR: CLEAR
AST SERPL W P-5'-P-CCNC: 31 U/L (ref 10–35)
BASOPHILS # BLD AUTO: 0.1 10E3/UL (ref 0–0.2)
BASOPHILS NFR BLD AUTO: 1 %
BILIRUB SERPL-MCNC: 0.3 MG/DL
BILIRUB UR QL STRIP: NEGATIVE
BUN SERPL-MCNC: 27.3 MG/DL (ref 8–23)
CALCIUM SERPL-MCNC: 9.1 MG/DL (ref 8.2–9.6)
CHLORIDE SERPL-SCNC: 98 MMOL/L (ref 98–107)
COLOR UR AUTO: ABNORMAL
CREAT SERPL-MCNC: 1.4 MG/DL (ref 0.51–0.95)
DEPRECATED HCO3 PLAS-SCNC: 22 MMOL/L (ref 22–29)
EOSINOPHIL # BLD AUTO: 0 10E3/UL (ref 0–0.7)
EOSINOPHIL NFR BLD AUTO: 0 %
ERYTHROCYTE [DISTWIDTH] IN BLOOD BY AUTOMATED COUNT: 15.7 % (ref 10–15)
GFR SERPL CREATININE-BSD FRML MDRD: 35 ML/MIN/1.73M2
GLUCOSE SERPL-MCNC: 95 MG/DL (ref 70–99)
GLUCOSE UR STRIP-MCNC: NEGATIVE MG/DL
HCT VFR BLD AUTO: 32.5 % (ref 35–47)
HGB BLD-MCNC: 11 G/DL (ref 11.7–15.7)
HGB UR QL STRIP: NEGATIVE
IMM GRANULOCYTES # BLD: 0.1 10E3/UL
IMM GRANULOCYTES NFR BLD: 1 %
KETONES UR STRIP-MCNC: NEGATIVE MG/DL
LEUKOCYTE ESTERASE UR QL STRIP: ABNORMAL
LYMPHOCYTES # BLD AUTO: 1.2 10E3/UL (ref 0.8–5.3)
LYMPHOCYTES NFR BLD AUTO: 16 %
MCH RBC QN AUTO: 32.4 PG (ref 26.5–33)
MCHC RBC AUTO-ENTMCNC: 33.8 G/DL (ref 31.5–36.5)
MCV RBC AUTO: 96 FL (ref 78–100)
MONOCYTES # BLD AUTO: 1.1 10E3/UL (ref 0–1.3)
MONOCYTES NFR BLD AUTO: 14 %
MUCOUS THREADS #/AREA URNS LPF: PRESENT /LPF
NEUTROPHILS # BLD AUTO: 5 10E3/UL (ref 1.6–8.3)
NEUTROPHILS NFR BLD AUTO: 68 %
NITRATE UR QL: NEGATIVE
NRBC # BLD AUTO: 0 10E3/UL
NRBC BLD AUTO-RTO: 0 /100
PH UR STRIP: 5.5 [PH] (ref 5–9)
PLATELET # BLD AUTO: 379 10E3/UL (ref 150–450)
POTASSIUM SERPL-SCNC: 4.5 MMOL/L (ref 3.4–5.3)
PROT SERPL-MCNC: 6.6 G/DL (ref 6.4–8.3)
RBC # BLD AUTO: 3.4 10E6/UL (ref 3.8–5.2)
RBC URINE: <1 /HPF
SODIUM SERPL-SCNC: 132 MMOL/L (ref 136–145)
SP GR UR STRIP: 1.01 (ref 1–1.03)
SQUAMOUS EPITHELIAL: 2 /HPF
UROBILINOGEN UR STRIP-MCNC: NORMAL MG/DL
WBC # BLD AUTO: 7.3 10E3/UL (ref 4–11)
WBC URINE: 2 /HPF

## 2023-04-13 PROCEDURE — 85025 COMPLETE CBC W/AUTO DIFF WBC: CPT | Mod: ZL

## 2023-04-13 PROCEDURE — 81001 URINALYSIS AUTO W/SCOPE: CPT | Mod: ZL

## 2023-04-13 PROCEDURE — 99350 HOME/RES VST EST HIGH MDM 60: CPT | Performed by: NURSE PRACTITIONER

## 2023-04-13 PROCEDURE — 80053 COMPREHEN METABOLIC PANEL: CPT | Mod: ZL

## 2023-04-13 PROCEDURE — 36415 COLL VENOUS BLD VENIPUNCTURE: CPT | Mod: ZL

## 2023-04-13 RX ORDER — LOSARTAN POTASSIUM 50 MG/1
50 TABLET ORAL DAILY
Qty: 90 TABLET | Refills: 3 | Status: SHIPPED | OUTPATIENT
Start: 2023-04-13 | End: 2024-03-21

## 2023-04-13 RX ORDER — IBUPROFEN 200 MG
200 TABLET ORAL 2 TIMES DAILY
Qty: 180 TABLET | Refills: 1 | COMMUNITY
Start: 2023-04-13 | End: 2023-04-14 | Stop reason: SINTOL

## 2023-04-13 NOTE — PROGRESS NOTES
"Virginia Leone is a 89 year old female being seen today for acute and follow up  visit at [facility].    Code Status: DNR / DNI, Advance Directives: YES-.   Health Care Power of : Extended Emergency Contact Information  Primary Emergency Contact: Mickie Jack  Home Phone: [phone]  Mobile Phone: [phone]  Relation: Daughter  Secondary Emergency Contact: Lyudmila Jamison  Home Phone: [phone]  Mobile Phone: [phone]  Relation: Daughter     Allergies: Penicillins and Latex     Chief Complaint / HPI: [facility] requests visit for recent illness congestion, intermittent cough past week--appetite low--staff have been encouraging fluids  And feel she may be turning  the corner as she ate a good breakfast. They have seen some behavior with confusion that is more than her baseline  And feel she may have a UTI  As she was hospitalized for this recently and would like to check as she has had similar pattern in the past. Was hospitalized in February for encephalopathy felt secondary to acute cystitis--urine culture and blood cultures not informative however responded well to IV fluids and antibiotics--also had presented with diarhhea.  [facility] feels she bounced back to her baseline after discharge--she did not make it into the clinic for her post hospital followup and lab recheck.    Staff tell me stools are daily and formed,staff feel urine looks abnornal--possibly more concentrated and cloudy, still has nasal congestion and cough but no dyspnea and sat checks have been above 90%.  \"Had a small emesis last night with Phlegm\" small mostly mucus--does have GERD history and on PPI.  She is assist of 1 transfers--mostly wheelchair for mobility--currently working with home therapy to improve walker assist mobility and transfers.   --on two medications for HTN, scheduled NSAIDS BID.  Resident able to give partial history--reports feeling well, alert, smiling, pleasant, conversational,sitting up in room--stood up with " CNA to use toilet--tolerated standing well requires assist with transfer and worked with therapist this morning. Staff report she ate most of her breakfast and has been taking fluids well but needs encouragement.        Past Medical, Surgical, Family and Social History reviewed: YES.     Medications: Reviewed and reconciled  Medications - recent changes: none    Review of Systems:  General: positive for weakness  ENT: positive for sinus congestion and postnasal drainage  Resp: positive for cough and URI  CV: positive for hypertension history  GI: positive for Incontinence  : positive for incontinence  Musculoskeletal: positive for arthritis and muscular weakness  Neurologic: positive for memory problems  All other systems negative  Toileting:    Continent of Bowel: No   Continent of Bladder: No  Mobility: walker and wheelchair    Recent Labs:     Recent Results (from the past 240 hour(s))   UA reflex to Microscopic and Culture    Collection Time: 04/13/23 10:30 AM    Specimen: Urine, Midstream   Result Value Ref Range    Color Urine Light Yellow Colorless, Straw, Light Yellow, Yellow    Appearance Urine Clear Clear    Glucose Urine Negative Negative mg/dL    Bilirubin Urine Negative Negative    Ketones Urine Negative Negative mg/dL    Specific Gravity Urine 1.011 1.000 - 1.030    Blood Urine Negative Negative    pH Urine 5.5 5.0 - 9.0    Protein Albumin Urine Negative Negative mg/dL    Urobilinogen Urine Normal Normal, 2.0 mg/dL    Nitrite Urine Negative Negative    Leukocyte Esterase Urine Small (A) Negative    Mucus Urine Present (A) None Seen /LPF    RBC Urine <1 <=2 /HPF    WBC Urine 2 <=5 /HPF    Squamous Epithelials Urine 2 (H) <=1 /HPF   Comprehensive Metabolic Panel    Collection Time: 04/13/23  2:33 PM   Result Value Ref Range    Sodium 132 (L) 136 - 145 mmol/L    Potassium 4.5 3.4 - 5.3 mmol/L    Chloride 98 98 - 107 mmol/L    Carbon Dioxide (CO2) 22 22 - 29 mmol/L    Anion Gap 12 7 - 15 mmol/L    Urea  Nitrogen 27.3 (H) 8.0 - 23.0 mg/dL    Creatinine 1.40 (H) 0.51 - 0.95 mg/dL    Calcium 9.1 8.2 - 9.6 mg/dL    Glucose 95 70 - 99 mg/dL    Alkaline Phosphatase 108 (H) 35 - 104 U/L    AST 31 10 - 35 U/L    ALT 36 (H) 10 - 35 U/L    Protein Total 6.6 6.4 - 8.3 g/dL    Albumin 3.9 3.5 - 5.2 g/dL    Bilirubin Total 0.3 <=1.2 mg/dL    GFR Estimate 35 (L) >60 mL/min/1.73m2   CBC with platelets and differential    Collection Time: 04/13/23  2:33 PM   Result Value Ref Range    WBC Count 7.3 4.0 - 11.0 10e3/uL    RBC Count 3.40 (L) 3.80 - 5.20 10e6/uL    Hemoglobin 11.0 (L) 11.7 - 15.7 g/dL    Hematocrit 32.5 (L) 35.0 - 47.0 %    MCV 96 78 - 100 fL    MCH 32.4 26.5 - 33.0 pg    MCHC 33.8 31.5 - 36.5 g/dL    RDW 15.7 (H) 10.0 - 15.0 %    Platelet Count 379 150 - 450 10e3/uL    % Neutrophils 68 %    % Lymphocytes 16 %    % Monocytes 14 %    % Eosinophils 0 %    % Basophils 1 %    % Immature Granulocytes 1 %    NRBCs per 100 WBC 0 <1 /100    Absolute Neutrophils 5.0 1.6 - 8.3 10e3/uL    Absolute Lymphocytes 1.2 0.8 - 5.3 10e3/uL    Absolute Monocytes 1.1 0.0 - 1.3 10e3/uL    Absolute Eosinophils 0.0 0.0 - 0.7 10e3/uL    Absolute Basophils 0.1 0.0 - 0.2 10e3/uL    Absolute Immature Granulocytes 0.1 <=0.4 10e3/uL    Absolute NRBCs 0.0 10e3/uL     Lab Results   Component Value Date    WBC 7.3 04/13/2023    HGB 11.0 (L) 04/13/2023    HCT 32.5 (L) 04/13/2023     04/13/2023    TRIG 134 08/22/2014    HDL 37 08/22/2014    ALT 36 (H) 04/13/2023    AST 31 04/13/2023     (L) 04/13/2023    BUN 27.3 (H) 04/13/2023    CO2 22 04/13/2023         Current Therapies: Home care PT/OT/Skilled nursing    Exam:  Vital signs reviewed.   GENERAL APPEARANCE: alert and no distress  EYES: Eyes grossly normal to inspection  HENT: Nasal congestion  NECK: no adenopathy, no asymmetry  RESP: generally moving air well in both fields--no wheeze, no rhonchi   CV: regular rate and rhythm, no peripheral edema   ABDOMEN: soft, nontender  MS: no  musculoskeletal defects are noted and wheelchair dependent  SKIN: warm and dry  NEURO: Alert, speech normal, pleasantly confused--conversational-staff report this is baseline  PSYCH: affect normal/bright    Assessment and Plan:    Resident with recent history viral URI still has nasal congestion able to cough clear sputum, smiling conversational, working with staff and home care therapy  With recent decrease in PO intake--staff reporting improving last 24 hours with food intake and taking fluids well with encouragement.  No nausea, diarrhea. BP reported sytol over 150--recheck at bedside with manual cuff 110s, HR 70-80s    Homecare RN able to draw followup labs--had missed hospital discharge followup visit last month  UA negative--did not meet lab criteria for culture.  Anemia improved--however sodium below normal and creatinine increase, history of CKD III--suspect renal burden secondary to dehydration  with recent viral illness and daily NSAIDS use.    Staff feel she is improving daily with mobility, taking POs, conversational, walking with assist-Limited treatment available at [facility]--trial conservative management with Holding scheduled BID NSAIDS, Hold orders for losartan--continue to encourage POs and regular scheduled Oral fluids and monitor---Home care RN can recheck BMP next week as long as continues to improve.  If any change in condition should send into ED for further evaluation and treatment.   Resident DNR/DNI, [facility] owner [facility] reports family agreeable with trial conservative management and close monitoring however if condition change would like brought into ED.  Did review above with Internal med hospitalist and agrees with current trial conservative approach with close monitoring.    If continues to improve--consider decreasing some medications next visit    Followup on lab results and progress next week    Over 60 minutes spent in chart review, care coordination and lab orders with home care, medication  review and management, consultation          (J06.9) Recent URI  (primary encounter diagnosis)    (I10) Essential hypertension    Plan: losartan (COZAAR) 50 MG tablet            (R94.4) Abnormal renal function test      (Z79.1) NSAID long-term use      (G30.9,  F02.80) Alzheimer's disease (H)    Plan: ibuprofen (ADVIL/MOTRIN) 200 MG tablet            (Z91.89) At risk for dehydration due to poor fluid intake      (Z59.3) Living in assisted living      (N18.30) Stage 3 chronic kidney disease, unspecified whether stage 3a or 3b CKD (H)      (Z79.899) Encounter for medication review      (Z74.09) Limited mobility

## 2023-04-13 NOTE — TELEPHONE ENCOUNTER
Called [facility] --will stop scheduled ibuprofen  Hold orders for daily Losartan    Continue fluids recheck BMP Wednesday 4/19 through homecare    Akiko Michel, APRN CNP   April 13, 2023

## 2023-04-13 NOTE — TELEPHONE ENCOUNTER
We are requesting orders to draw labs in patient's home.    Labs requested:    CBC with DIFF  Comp Panel        Thank you for your time,    Araceli Crews RN

## 2023-04-14 ENCOUNTER — TELEPHONE (OUTPATIENT)
Dept: FAMILY MEDICINE | Facility: OTHER | Age: 88
End: 2023-04-14
Payer: MEDICARE

## 2023-04-14 ENCOUNTER — TELEPHONE (OUTPATIENT)
Dept: FAMILY MEDICINE | Facility: OTHER | Age: 88
End: 2023-04-14

## 2023-04-14 RX ORDER — ACETAMINOPHEN 500 MG
TABLET ORAL
Qty: 180 TABLET | Refills: 11 | Status: SHIPPED | OUTPATIENT
Start: 2023-04-14 | End: 2024-04-24

## 2023-04-14 NOTE — TELEPHONE ENCOUNTER
Request for Home Care Physical Therapy orders as follows:        Continuation frequency =   ___2___  x week x  ___1___ week(s)    Effective date = 4/17/23      Continuation frequency =   ___1___  x week x  ___1___ week(s)    Effective date = 4/24/23        Therapist: Agustina Ruiz PT    Please respond with .HOMECAREAGREE, if you are in agreement. Please sign with your comments and signature, if you are not in agreement.

## 2023-04-14 NOTE — TELEPHONE ENCOUNTER
[facility]    Asking about resident appearing more weak this morning--not taking POs  Wondering what should be done    Yesterday resident alert, smiling conversational--taking pos  VSS  Labs drawn late afternoon showing decrease Na and creat change  With resident felt by staff improving closer to baseline  Conservative approach trial with stopping chronic NSAIDS  Continuing to push fluids to rehydrate from recovery of recent viral illness  And homecare recheck labs next week as long as stable      With reported change in general condition--DNR status but [facility] staff feel family would like acute treatment    Recommended to send into ED for further eval and treatment     Akiko Michel, APRN CNP   April 14, 2023

## 2023-04-16 NOTE — TELEPHONE ENCOUNTER
"Call from [facility] at 10 am Friday 4/14    Reports Virginia had an \"off\" night--ate very well, taking pos well  And feels is doing very well    Will continue to observe--added PRN Tylenol  Ibuprofen scheduled DCd      If any changes inn condition will send into ED  Otherwise if continues to progress Homecare will draw lab recheck Wednesday  Akiko Michel, APRN CNP   April 15, 2023         "

## 2023-04-19 ENCOUNTER — TELEPHONE (OUTPATIENT)
Dept: FAMILY MEDICINE | Facility: OTHER | Age: 88
End: 2023-04-19

## 2023-04-19 DIAGNOSIS — N18.30 STAGE 3 CHRONIC KIDNEY DISEASE, UNSPECIFIED WHETHER STAGE 3A OR 3B CKD (H): Primary | ICD-10-CM

## 2023-04-19 NOTE — TELEPHONE ENCOUNTER
JUSTICE Wharton: I went to see the patient today to obtain her BMP, the patient was upset and refusing labs, Ana Rosa was able to speak with the patient and asked if we could return in the morning.     R: Can I get a new verbal or written order to obtain my labs tomorrow morning? I am planning to return to [facility] by 7:40am.     Thank you,     Araceli Crews RN on 4/19/2023 at 1:04 PM

## 2023-04-20 ENCOUNTER — TELEPHONE (OUTPATIENT)
Dept: FAMILY MEDICINE | Facility: OTHER | Age: 88
End: 2023-04-20

## 2023-04-20 ENCOUNTER — LAB REQUISITION (OUTPATIENT)
Dept: LAB | Facility: OTHER | Age: 88
End: 2023-04-20
Payer: MEDICARE

## 2023-04-20 DIAGNOSIS — N18.30 CHRONIC KIDNEY DISEASE, STAGE 3 UNSPECIFIED (H): ICD-10-CM

## 2023-04-20 LAB
ANION GAP SERPL CALCULATED.3IONS-SCNC: 10 MMOL/L (ref 7–15)
BUN SERPL-MCNC: 19.2 MG/DL (ref 8–23)
CALCIUM SERPL-MCNC: 9.9 MG/DL (ref 8.2–9.6)
CHLORIDE SERPL-SCNC: 103 MMOL/L (ref 98–107)
CREAT SERPL-MCNC: 1.02 MG/DL (ref 0.51–0.95)
DEPRECATED HCO3 PLAS-SCNC: 24 MMOL/L (ref 22–29)
GFR SERPL CREATININE-BSD FRML MDRD: 51 ML/MIN/1.73M2
GLUCOSE SERPL-MCNC: 142 MG/DL (ref 70–99)
POTASSIUM SERPL-SCNC: 5.3 MMOL/L (ref 3.4–5.3)
SODIUM SERPL-SCNC: 137 MMOL/L (ref 136–145)

## 2023-04-20 PROCEDURE — 80048 BASIC METABOLIC PNL TOTAL CA: CPT | Performed by: NURSE PRACTITIONER

## 2023-04-20 NOTE — TELEPHONE ENCOUNTER
Santo Pagan for drawing that followup lab--looks very good--almost returned to normal    Facility staff notified of results    Akiko Michel, APRN CNP   April 20, 2023

## 2023-04-21 ENCOUNTER — TELEPHONE (OUTPATIENT)
Dept: FAMILY MEDICINE | Facility: OTHER | Age: 88
End: 2023-04-21
Payer: MEDICARE

## 2023-04-21 NOTE — TELEPHONE ENCOUNTER
Request for Home Care Occupational Therapy orders as follows:          Continuation frequency =   ___2___  x week x  ___1___ week(s)    Effective date = 4/24/23                            For therapist: LIBBY Unger/L  Please respond with .HOMECAREAGREE, if you are in agreement. Please sign with your comments and signature, if you are not in agreement.

## 2023-04-26 DIAGNOSIS — F02.80 ALZHEIMER'S DISEASE (H): Primary | ICD-10-CM

## 2023-04-26 DIAGNOSIS — G30.9 ALZHEIMER'S DISEASE (H): Primary | ICD-10-CM

## 2023-05-11 ENCOUNTER — NURSING HOME VISIT (OUTPATIENT)
Dept: GERIATRICS | Facility: OTHER | Age: 88
End: 2023-05-11
Attending: NURSE PRACTITIONER
Payer: MEDICARE

## 2023-05-11 DIAGNOSIS — H91.13 PRESBYCUSIS OF BOTH EARS: ICD-10-CM

## 2023-05-11 DIAGNOSIS — G30.9 ALZHEIMER'S DISEASE (H): Primary | ICD-10-CM

## 2023-05-11 DIAGNOSIS — Z78.9 LIVES IN ASSISTED LIVING FACILITY: ICD-10-CM

## 2023-05-11 DIAGNOSIS — M15.0 PRIMARY OSTEOARTHRITIS INVOLVING MULTIPLE JOINTS: ICD-10-CM

## 2023-05-11 DIAGNOSIS — R46.89 EPISODE OF BEHAVIOR CHANGE: ICD-10-CM

## 2023-05-11 DIAGNOSIS — H61.23 BILATERAL IMPACTED CERUMEN: ICD-10-CM

## 2023-05-11 DIAGNOSIS — F02.80 ALZHEIMER'S DISEASE (H): Primary | ICD-10-CM

## 2023-05-11 DIAGNOSIS — Z79.899 ENCOUNTER FOR MEDICATION REVIEW: ICD-10-CM

## 2023-05-11 PROCEDURE — 99348 HOME/RES VST EST LOW MDM 30: CPT | Performed by: NURSE PRACTITIONER

## 2023-05-11 RX ORDER — IBUPROFEN 200 MG
200 TABLET ORAL 2 TIMES DAILY PRN
Qty: 60 TABLET | Refills: 1 | Status: SHIPPED | OUTPATIENT
Start: 2023-05-11 | End: 2024-06-18

## 2023-05-11 NOTE — PROGRESS NOTES
Virginia Leone is a 89 year old female being seen today for acute and follow up visit at [facility].    Code Status: Advance Directives: YES-.   Health Care Power of : Extended Emergency Contact Information  Primary Emergency Contact: Mickie Jack  Home Phone: [phone]  Mobile Phone: [phone]  Relation: Daughter  Secondary Emergency Contact: Lyudmila Jamison  Home Phone: [phone]  Mobile Phone: [phone]  Relation: Daughter     Allergies: Penicillins and Latex     Chief Complaint / HPI: Assisted living facility requesting a follow-up for face-to-face for wheelchair--resident currently using a loaner.  Recently discharged from home care therapy services--I did reach out to physical therapist who recommends standard wheelchair will meet her needs.  Nursing staff had contacted me 1 week ago to report Virginia was having some behavior changes where she would become quite anxious sometimes agitated  And difficult to redirect.  Recently saw her about a month ago for a follow-up on weakness and dehydration, at that time had discontinued scheduled ibuprofen due to renal insufficiency  Nursing staff had encouraged regular fluid hydration, follow-up labs were obtained which improved and resident returned to baseline.  In speaking with [facility] owner of facility she feels that some of the behavior changes that are seen with the anxiety and agitation may be due to her pain from osteoarthritis as she feels this started after scheduled ibuprofen was discontinued.  The nursing staff report Virginia requires standby assistance with walker that is used for transfers only--primary mobility with wheelchair as her gait is too unsteady and optimal after completing physical therapy.    I did reach out to her son Krystian to ascertain agreement on ordering wheelchair through [employer] vendor and he is agreeable and requested a phone call from [employer] to explain billing.  He also asked about hearing assessments and  recommendations on hearing aids--- suggested seeing audiologist when he is visiting the area in mid June.  Apparently communication has become quite an issue with resident and assisted living staff and this would easily trigger frustration, anxiety and agitation.        Documentation of Face-to-Face and Certification for Home Health DME     Patient: Virginia Leone   YOB: 1929  MR Number: [id number]  Today's Date: 5/12/2023    I certify that patient: Virginia Leone is under my care and that I, or a nurse practitioner or physician's assistant working with me, had a face-to-face encounter that meets the physician face-to-face encounter requirements with this patient on: 5/11/2023.    This encounter with the patient was in whole, or in part, for the following medical condition, which is the primary reason for home health DME: Alzheimer's dementia    I certify that, based on my findings, the following services are medically necessary home health services: Wheelchair DME.    My clinical findings support the need for the above services because: Unsteady gait and immobility syndrome secondary to Alzheimer's dementia    Further, I certify that my clinical findings support that this patient is homebound (i.e. absences from home require considerable and taxing effort and are for medical reasons or [employer] services or infrequently or of short duration when for other reasons) because: Mental health symptoms including: Mental health condition is exacerbated by exposure to crowds, unfamiliar environment or new stressful situations.. and Requires assistance of another person or specialized equipment to access medical services because patient: Is unable to exit home safely on own due to: Unsteady gait secondary to Alzheimer's dementia., Is unable to operate assistive equipment on their own., Is unable to walk greater than 5 feet without rest., Range of motion limitations prevents ability to exit home safely.  and Requires supervision of another for safe transfer...    Based on the above findings. I certify that this patient is confined to the home and needs intermittent skilled nursing care, physical therapy and/or speech therapy.  The patient is under my care, and I have initiated the establishment of the plan of care.  This patient will be followed by a physician who will periodically review the plan of care.  Physician/Provider to provide follow up care: Akiko Michel     Responsible Medicare certified PECOS Physician: Akiko Michel  Physician Signature: See electronic signature associated with these discharge orders.  Date: 5/12/2023    Past Medical, Surgical, Family and Social History reviewed: YES.     Medications: Reviewed and reconciled  Medications - recent changes: Scheduled ibuprofen discontinued    Review of Systems:  General: positive for weakness  ENT: positive for hearing loss  GI: positive for incontinence  : positive for incontinence  Musculoskeletal: positive for muscular weakness  Neurologic: positive for memory problems  All other systems negative  Toileting:    Continent of Bowel: No   Continent of Bladder: No  Mobility: walker and wheelchair    Recent Labs: reviewed most recent      Current Therapies: Recently discharged from physical therapy home care    Exam:  Vital signs reviewed.   GENERAL APPEARANCE:  alert and no distress  EYES: Eyes grossly normal to inspection,  HENT: Very hard of hearing with hearing aids, bilateral external canals otoscopic exam cerumen impaction  NECK: no adenopathy, no asymmetry  MS: no musculoskeletal defects are noted and gait is unsteady  SKIN: Warm and dry  NEURO: Alert, mentation intact and speech normal. Confused--not a new finding  PSYCH: mentation appears normal and affect normal/bright    Assessment and Plan:    Face-to-face visit with resident for impaired mobility secondary to Alzheimer's dementia necessitating wheelchair to optimize independence and safety  in current assisted living.  Did speak with her son Krystian about this and agrees to working with [facility] medical who will reach out to him and discuss billing process and delivery to place of residence.    Reported change in behaviors suspect constellation of triggers including hearing impaired, dementia, osteoarthritis pain.  Agree with [facility] facility owner and will restart ibuprofen at previous dose of 200 mg with food PRN twice daily--staff will monitor for response and we will follow-up on this next rounding day    Son and facility owner will discuss follow-up for hearing device assessment--would suggest audiologist  Will plan to bring equipment to remove cerumen next rounding day hopefully this will help augment baseline hearing.            (G30.9,  F02.80) Alzheimer's disease (H)  (primary encounter diagnosis)    Plan: Wheelchair Order for DME - ONLY FOR DME            (Z79.899) Encounter for medication review      (H61.23) Bilateral impacted cerumen      (H91.13) Presbycusis of both ears      (Z59.3) Lives in assisted living facility      (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    Plan: ibuprofen (ADVIL/MOTRIN) 200 MG tablet            (R46.89) Episode of behavior change    Plan: ibuprofen (ADVIL/MOTRIN) 200 MG tablet

## 2023-05-18 ENCOUNTER — NURSING HOME VISIT (OUTPATIENT)
Dept: GERIATRICS | Facility: OTHER | Age: 88
End: 2023-05-18
Attending: NURSE PRACTITIONER
Payer: MEDICARE

## 2023-05-18 DIAGNOSIS — H91.13 PRESBYCUSIS OF BOTH EARS: ICD-10-CM

## 2023-05-18 DIAGNOSIS — Z79.899 ENCOUNTER FOR MEDICATION REVIEW: ICD-10-CM

## 2023-05-18 DIAGNOSIS — H61.23 BILATERAL IMPACTED CERUMEN: Primary | ICD-10-CM

## 2023-05-18 DIAGNOSIS — Z78.9 LIVING IN ASSISTED LIVING: ICD-10-CM

## 2023-05-18 PROCEDURE — 99347 HOME/RES VST EST SF MDM 20: CPT | Performed by: NURSE PRACTITIONER

## 2023-05-22 NOTE — PROGRESS NOTES
Virignia Leone is a 89 year old female being seen today for acute and follow up visit at [facility].    Code Status: Advance Directives: YES-.   Health Care Power of : Extended Emergency Contact Information  Primary Emergency Contact: Mickie Jack  Home Phone: [phone]  Mobile Phone: [phone]  Relation: Daughter  Secondary Emergency Contact: Lyudmila Jamison  Home Phone: [phone]  Mobile Phone: [phone]  Relation: Daughter     Allergies: Penicillins and Latex     Chief Complaint / HPI: Follow-up requested for bilateral cerumen impactions, resident is hearing impaired does have hearing aids however difficulty communicating with staff.  Nursing staff of also reported some episodes of agitative behaviors--I did request behavior monitoring and no particular triggers determined  Facility owner Marina feels this may be osteo arthritis triggered as her scheduled ibuprofen was discontinued a month ago and feels the behaviors started at that time.  Resident unable to give complete history due to memory issues--all historical information provided by nursing staff      Past Medical, Surgical, Family and Social History reviewed: YES.     Medications: Reviewed  Medications - recent changes: Ibuprofen DCd    Review of Systems:  General: positive for weakness  ENT: positive for hearing loss and cerumen impaction  Musculoskeletal: positive for arthritis and muscular weakness  Neurologic: positive for memory problems and behavior changes  All other systems negative  Toileting:    Continent of Bowel: No   Continent of Bladder: No  Mobility: walker and wheelchair    Recent Labs: most recent reviewed      Current Therapies: Recently discharged    Exam:  Vital signs reviewed.  GENERAL APPEARANCE:  alert and no distress  EYES: Eyes grossly normal to inspection  HENT: Bilateral external canals impacted with brown cerumen--- flushed both sides multiple times  Right Removed two large 1 cm cerumen plugs with curette  multiple small pieces flushed TM intact.  Left external canal flushed multiple times with warm water, three large 1/2 and 1 CM cerumen plugs removed with curette, TM intact  NECK: no adenopathy, no asymmetry  MS: no musculoskeletal defects are noted and wheelchair dependent for mobility--transfers with walker and 1 man assist  SKIN: warm and dry  NEURO: Alert, mentation intact and speech normal, pleasantly confused not a new finding  PSYCH:affect normal/bright    Assessment and Plan:    Very pleasant gracious lady hard of hearing with hearing aids.  Bilateral cerumen impactions removed with curette and gentle warm water flushes, tolerated well no complications.  I can see how communication difficulties due to hearing impairment would affect behavior with daily interactions.  I believe her son who lives out of state but is most involved in her care plans to visit in June and see audiology to see if current hearing assistive devices can be optimized or consideration of new amplification devices.    Regarding the nursing staff reports last week of intermittent episodes of agitation--- no difficulties whatsoever during my exam and time spent removing cerumen impactions.  [facility] facility owner feels osteoarthritis related which makes sense.  Recently discontinued scheduled NSAIDs due to recent ED visit for dehydration and renal insufficiency which resolved.  Will restart ibuprofen available at previous dose on a PRN basis twice a day--- would like staff to monitor response and behaviors  There is no indication at this time to introduce any other medications related to behavior.    Over 20 minutes spent in face to face visit with direct exam, removal of cerumen impactions with irrigation and curette, medication review and management    (H61.23) Bilateral impacted cerumen  (primary encounter diagnosis)      (H91.13) Presbycusis of both ears      (Z59.3) Living in assisted living      (Z79.899) Encounter for medication  review

## 2023-07-13 DIAGNOSIS — D50.9 IRON DEFICIENCY ANEMIA, UNSPECIFIED IRON DEFICIENCY ANEMIA TYPE: ICD-10-CM

## 2023-07-13 RX ORDER — FERROUS GLUCONATE 324(38)MG
TABLET ORAL
Qty: 45 TABLET | Refills: 2 | Status: SHIPPED | OUTPATIENT
Start: 2023-07-13 | End: 2023-09-27

## 2023-08-01 ENCOUNTER — TELEPHONE (OUTPATIENT)
Dept: FAMILY MEDICINE | Facility: OTHER | Age: 88
End: 2023-08-01
Payer: MEDICARE

## 2023-08-01 DIAGNOSIS — R35.0 URINE FREQUENCY: Primary | ICD-10-CM

## 2023-08-02 DIAGNOSIS — R35.0 URINE FREQUENCY: Primary | ICD-10-CM

## 2023-08-02 LAB
ALBUMIN UR-MCNC: 20 MG/DL
APPEARANCE UR: ABNORMAL
BACTERIA #/AREA URNS HPF: ABNORMAL /HPF
BILIRUB UR QL STRIP: NEGATIVE
COLOR UR AUTO: YELLOW
GLUCOSE UR STRIP-MCNC: NEGATIVE MG/DL
HGB UR QL STRIP: ABNORMAL
HYALINE CASTS: 1 /LPF
KETONES UR STRIP-MCNC: NEGATIVE MG/DL
LEUKOCYTE ESTERASE UR QL STRIP: ABNORMAL
MUCOUS THREADS #/AREA URNS LPF: PRESENT /LPF
NITRATE UR QL: NEGATIVE
PH UR STRIP: 5.5 [PH] (ref 5–9)
RBC URINE: 2 /HPF
SP GR UR STRIP: 1.02 (ref 1–1.03)
UROBILINOGEN UR STRIP-MCNC: NORMAL MG/DL
WBC URINE: 8 /HPF

## 2023-08-02 PROCEDURE — 81001 URINALYSIS AUTO W/SCOPE: CPT | Mod: ZL | Performed by: NURSE PRACTITIONER

## 2023-08-02 PROCEDURE — 87086 URINE CULTURE/COLONY COUNT: CPT | Mod: ZL | Performed by: NURSE PRACTITIONER

## 2023-08-02 RX ORDER — CIPROFLOXACIN 250 MG/1
250 TABLET, FILM COATED ORAL 2 TIMES DAILY
Qty: 14 TABLET | Refills: 0 | Status: SHIPPED | OUTPATIENT
Start: 2023-08-02 | End: 2023-08-09

## 2023-08-04 LAB — BACTERIA UR CULT: NORMAL

## 2023-08-18 DIAGNOSIS — G30.9 ALZHEIMER'S DISEASE (H): ICD-10-CM

## 2023-08-18 DIAGNOSIS — F02.80 ALZHEIMER'S DISEASE (H): ICD-10-CM

## 2023-08-19 RX ORDER — CHOLECALCIFEROL (VITAMIN D3) 25 MCG
1 CAPSULE ORAL EVERY EVENING
Qty: 90 CAPSULE | Refills: 1 | Status: SHIPPED | OUTPATIENT
Start: 2023-08-19

## 2023-08-19 RX ORDER — ASPIRIN 81 MG/1
81 TABLET, CHEWABLE ORAL DAILY
Qty: 90 TABLET | Refills: 1 | Status: SHIPPED | OUTPATIENT
Start: 2023-08-19 | End: 2024-02-23

## 2023-08-24 ENCOUNTER — NURSING HOME VISIT (OUTPATIENT)
Dept: GERIATRICS | Facility: OTHER | Age: 88
End: 2023-08-24
Attending: NURSE PRACTITIONER
Payer: MEDICARE

## 2023-08-24 DIAGNOSIS — H61.23 BILATERAL IMPACTED CERUMEN: ICD-10-CM

## 2023-08-24 DIAGNOSIS — L89.890 PRESSURE ULCER OF TOE OF RIGHT FOOT, UNSTAGEABLE (H): Primary | ICD-10-CM

## 2023-08-24 DIAGNOSIS — G30.9 ALZHEIMER'S DISEASE (H): ICD-10-CM

## 2023-08-24 DIAGNOSIS — H91.13 PRESBYCUSIS OF BOTH EARS: ICD-10-CM

## 2023-08-24 DIAGNOSIS — Z79.899 ENCOUNTER FOR MEDICATION REVIEW: ICD-10-CM

## 2023-08-24 DIAGNOSIS — F02.80 ALZHEIMER'S DISEASE (H): ICD-10-CM

## 2023-08-24 DIAGNOSIS — Z78.9 LIVING IN ASSISTED LIVING: ICD-10-CM

## 2023-08-24 PROCEDURE — 99348 HOME/RES VST EST LOW MDM 30: CPT | Performed by: NURSE PRACTITIONER

## 2023-08-24 RX ORDER — BACITRACIN ZINC 500 [USP'U]/G
OINTMENT TOPICAL
Qty: 15 G | Refills: 1 | Status: SHIPPED | OUTPATIENT
Start: 2023-08-24

## 2023-08-24 NOTE — PROGRESS NOTES
Virginia Leone is a 89 year old female being seen today for acute and follow up visit at [facility].    Code Status: Advance Directives: YES  Health Care Power of : Extended Emergency Contact Information  Primary Emergency Contact: Mickie Jack  Home Phone: [phone]  Mobile Phone: [phone]  Relation: Daughter  Secondary Emergency Contact: Lyudmila Jamison  Home Phone: [phone]  Mobile Phone: [phone]  Relation: Daughter     Allergies: Penicillins and Latex     Chief Complaint / HPI: Assisted living nurses requesting a follow-up visit today for a newly discovered pressure injury ulcer dorsum aspect of right metatarsal #3 a few days ago.  Resident is basically nonambulatory--wheelchair dependent rarely wear shoes--- staff suspects she may have bumped it against the side of her wheelchair.  Resident is very hard of hearing--my understanding is family are in the process of purchasing hearing aids to improve communication socialization and quality of life.  Resident does have dementia so unable to provide complete history--majority of historical feedback provided by nursing staff.  Nursing staff report appetite, bowel and bladder habits, sleep have been at baseline and there are no other concerns raised.    Past Medical, Surgical, Family and Social History reviewed: YES.     Medications: Reviewed  Medications - recent changes: none    Review of Systems:  General: positive for weakness  Skin: positive for pressure ulcer  ENT: positive for hearing loss and cerumen impaction  GI: positive for incontinence  : positive for incontinence  Musculoskeletal: positive for arthritis and muscular weakness  Neurologic: positive for speech problems and memory problems  Psychiatric: positive for anxiety  All other systems negative  Toileting:    Continent of Bowel: No   Continent of Bladder: No  Mobility: wheelchair    Recent Labs: None      Current Therapies: none    Exam:  Vital signs reviewed.   GENERAL  APPEARANCE: alert and no distress  EYES: Eyes grossly normal to inspection  HENT: Bilateral external canals impacted with dry pale yellow cerumen--warm water flushes both external canals--- curette removal 1 cm cerumen plug from right canal  And 2 cm pale brown cerumen plug left canal--- tolerated well, TMs intact no complications  NECK: no adenopathy, no asymmetry  RESP: lungs clear   CV: regular rate and rhythm, normal S1 S2, no S3 or S4, no murmur, click or rub, no peripheral edema   ABDOMEN: soft, nontender   MS: Wheelchair dependent requires assistance of at least 1 staff to transfer from chair to chair, chair to toilet and chair to bed--metatarsals arthritic hammer toe deformities  SKIN: Warm and dry----dorsum metatarsal #3 right foot has a 4 mm ulcer with pale yellow dry slough, mild erythema about half a centimeter surrounding ulcer no active drainage  Able to remove most of slough with saline 4 x 4 gauze--- skin slough remains wound bed--cleansed with soap and water, triple antibiotic ointment covered with Square cut to fit Mepilex Ag sponge held in place with Band-Aid  NEURO: Alert,mentation intact and speech dysarthric  PSYCH: affect generally anxious    Assessment and Plan:    Right foot metatarsal pressure injury ulcer recently discovered by assistant living caregiving staff--have been keeping it offloaded  Able to remove most of slough with saline and dry gauze--- we will have staff dress wound area with bacitracin, cut to fit Mepilex Ag sponge held in place with Band-Aid dressing or roll gauze and change every other day and PRN  Facility RN will monitor wound at least twice a week--- I will follow-up in 2 weeks on progress staff will notify if any concerns sooner    Resident very hard of hearing--bilateral cerumen impactions easily loosened with warm water flushes and removed cerumen plugs with curette tolerated well no complications    Initially had considered starting home care skilled nursing wound  care services and possibly therapy to optimize mobility and transfers  Before arriving today--- however wound status has improved since 2 days ago per RN and facility nursing staff feel comfortable managing with wound care.  If condition changes and not responding to above treatment plan we will plan to bring home care skilled nursing wound care on board for expertise, support and close monitoring until completely healed.    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

## 2023-08-30 ENCOUNTER — TELEPHONE (OUTPATIENT)
Dept: FAMILY MEDICINE | Facility: OTHER | Age: 88
End: 2023-08-30
Payer: MEDICARE

## 2023-08-30 NOTE — TELEPHONE ENCOUNTER
Call from Zahida at [facility] reporting wound bed on toe seen last week is a little larger covered with slough    Recommend damp alginate to cover wound bed  After wash to dissolve slough--cover with foam dressing or gauze  And will be up to look at tomorrow morning    Akiko Michel, APRN CNP   August 30, 2023

## 2023-08-31 ENCOUNTER — NURSING HOME VISIT (OUTPATIENT)
Dept: GERIATRICS | Facility: OTHER | Age: 88
End: 2023-08-31
Attending: NURSE PRACTITIONER
Payer: MEDICARE

## 2023-08-31 DIAGNOSIS — Z78.9 LIVING IN ASSISTED LIVING: ICD-10-CM

## 2023-08-31 DIAGNOSIS — L89.890 PRESSURE ULCER OF TOE OF RIGHT FOOT, UNSTAGEABLE (H): Primary | ICD-10-CM

## 2023-08-31 DIAGNOSIS — F02.80 ALZHEIMER'S DISEASE (H): ICD-10-CM

## 2023-08-31 DIAGNOSIS — G30.9 ALZHEIMER'S DISEASE (H): ICD-10-CM

## 2023-08-31 DIAGNOSIS — Z99.3 DEPENDENT FOR WHEELCHAIR MOBILITY: ICD-10-CM

## 2023-08-31 PROCEDURE — 99349 HOME/RES VST EST MOD MDM 40: CPT | Performed by: NURSE PRACTITIONER

## 2023-08-31 NOTE — PROGRESS NOTES
Virginia Leone is a 89 year old female being seen today for acute and follow up visit at [facility].    Code Status: Advance Directives: YES-  Health Care Power of : Extended Emergency Contact Information  Primary Emergency Contact: Mickie Jack  Home Phone: [phone]  Mobile Phone: [phone]  Relation: Daughter  Secondary Emergency Contact: Lyudmila Jamison  Home Phone: [phone]  Mobile Phone: [phone]  Relation: Daughter     Allergies: Penicillins and Latex     Chief Complaint / HPI: Assisted living nursing staff requesting a follow-up visit today on a right metatarsal #3 pressure ulcer first identified about 10 days ago--did follow-up last week was able to debride some of the slough in the wound bed with gauze--covered with triple antibiotic ointment to wound bed and Band-Aid dressing  Staff called 2 days ago to report worsening with increased diameter of wound bed, filled with slough unable to see and assess beneath wound bed, pain with palpation  Resident has advanced dementia and unable to provide complete history--- I did ask the staff to soak her feet in soapy water to cleanse  And would round to follow-up on progress and wound care plan.  Staff do not raise any other concerns they report weight has been stable, appetite, bowel and bladder habits which are incontinent are unchanged    Past Medical, Surgical, Family and Social History reviewed: YES.     Medications: Reviewed  Medications - recent changes:     Review of Systems:  General: positive for weakness  Skin: positive for pressure injury wound  GI: positive for incontinent  : positive for incontinence  Musculoskeletal: positive for arthritis and muscular weakness  Neurologic: positive for memory problems  Psychiatric: positive for anxiety  All other systems negative  Toileting:    Continent of Bowel: No   Continent of Bladder: No  Mobility: wheelchair    Recent Labs: None      Current Therapies: none    Exam:          Vital  signs reviewed.   GENERAL APPEARANCE: Arouses easily to alert and no distress, appears generally comfortable resting in recliner  EYES: Eyes grossly normal to inspection  RESP: lungs clear   CV: regular rate and rhythm, normal S1 S2, no S3 or S4, no murmur, click or rub, no peripheral edema   MS: no musculoskeletal defects are noted and wheelchair dependent--severe arthritis multiple hammertoes bilateral  SKIN: Pressure ulcer dorsum right foot metatarsal #3 wound bed slough filled about 1 cm diameter with raised periwound erythema about 3 mm depth  Foot cleansed with soap and water, attempted debridement with gauze unsuccessful and painful  Santyl thin layer applied to wound bed covered with Band-Aid dressing  NEURO: mentation generally drowsy however arouses easily to voice and speech normal--confused at baseline  PSYCH: affect  generally anxious    Assessment and Plan:    Resident with advanced dementia who has developed pressure ulcer dorsum aspect right metatarsal #3 uncertain if due to shoes or injury as she is wheelchair dependent.  Pressure ulcer identified about 10 days ago has not responded to triple antibiotic ointment to wound bed Band-Aid dressing cover and offloading.  Wound bed about 1 cm diameter and unable to predict depth however periwound protruding at least 4 mm from last week--  Wound care treatment plan daily cleanse, Santyl thin layer to wound bed, protect periwound with Vaseline or triple antibiotic ointment cover with Band-Aid dressing and change daily.  Continue to offload feet.  RN directed to notify if wound bed slough completely resolved and would adjust daily treatment plan.  We will plan to follow-up in 2 weeks, facility RN will monitor closely and will notify sooner PRN    (L89.890) Pressure ulcer of toe of right foot, unstageable (H)  (primary encounter diagnosis)    Plan: collagenase (SANTYL) 250 UNIT/GM external         ointment            (G30.9,  F02.80) Alzheimer's disease  (H)    Plan: collagenase (SANTYL) 250 UNIT/GM external         ointment            (Z59.3) Living in assisted living    Plan: collagenase (SANTYL) 250 UNIT/GM external         ointment

## 2023-09-07 ENCOUNTER — NURSING HOME VISIT (OUTPATIENT)
Dept: GERIATRICS | Facility: OTHER | Age: 88
End: 2023-09-07
Attending: NURSE PRACTITIONER
Payer: MEDICARE

## 2023-09-07 VITALS — RESPIRATION RATE: 14 BRPM | BODY MASS INDEX: 17.8 KG/M2 | TEMPERATURE: 97.2 F | WEIGHT: 100.5 LBS

## 2023-09-07 DIAGNOSIS — M15.0 PRIMARY OSTEOARTHRITIS INVOLVING MULTIPLE JOINTS: ICD-10-CM

## 2023-09-07 DIAGNOSIS — Z78.9 LIVING IN ASSISTED LIVING: ICD-10-CM

## 2023-09-07 DIAGNOSIS — G30.9 ALZHEIMER'S DISEASE (H): ICD-10-CM

## 2023-09-07 DIAGNOSIS — F02.80 ALZHEIMER'S DISEASE (H): ICD-10-CM

## 2023-09-07 DIAGNOSIS — L89.890 PRESSURE ULCER OF TOE OF RIGHT FOOT, UNSTAGEABLE (H): Primary | ICD-10-CM

## 2023-09-07 PROCEDURE — 99347 HOME/RES VST EST SF MDM 20: CPT | Performed by: NURSE PRACTITIONER

## 2023-09-07 NOTE — PROGRESS NOTES
Virginia Leone is a 89 year old female being seen today for acute and follow up visit visit at [facility].    Code Status: Advance Directives: YES-.   Health Care Power of : Extended Emergency Contact Information  Primary Emergency Contact: Mickie Jack  Home Phone: [phone]  Mobile Phone: [phone]  Relation: Daughter  Secondary Emergency Contact: Lyudmila Jamison  Home Phone: [phone]  Mobile Phone: [phone]  Relation: Daughter     Allergies: Penicillins and Latex     Chief Complaint / HPI: Follow-up on progress with right metatarsal #3 pressure ulcer.  Had started a Santyl in the wound bed to remove the slough last week.  Facility RN feels that there has been some improvement as the slough is softening in the wound bed and starting to retract from the edges less erythema periwound area.  Resident is unable to provide any history due to advanced dementia--area in question is tender with manipulation  Staff have been keeping pressure off of her foot--resident is nonambulatory wheelchair dependent for all mobility  Nursing staff feel that her appetite, bowel and bladder habits have been at baseline--- in reviewing current weight which is checked monthly at facility and has not been checked for 3 weeks  Possibly may have lost 2 pounds however in reviewing back over the past year she has ranged between 100 to 105 pounds probable variant with scale and setting.  Staff do not raise any other concerns      Past Medical, Surgical, Family and Social History reviewed: YES.     Medications: reviewed  Medications - recent changes:     Review of Systems:  General: positive for weakness  Skin: positive for pressure ulcer  GI: positive for incontinence  : positive for incontinence  Musculoskeletal: positive for arthritis, joint pain, and muscular weakness  Neurologic: positive for memory problems  All other systems negative  Toileting:    Continent of Bowel: No   Continent of Bladder: No  Mobility:  wheelchair    Recent Labs: None      Current Therapies: none    Exam:  Vital signs reviewed.   GENERAL APPEARANCE: alert and no distress  EYES: Eyes grossly normal to inspection  CV: regular rate and rhythm, no peripheral edema   ABDOMEN: soft, nontender  MS: wheelchair dependent  SKIN: aprox 1 cm annular pressure ulcer--wound bed slough however softer and starting to retract from periwound edges--no surrounding periwound erythema  Area cleansed, snatyl applied to wound bed--bandaid dressing  NEURO: mentation drowsy arouses easily to voice,and speech normal--confused at baseline  PSYCH: affect normal/bright    Assessment and Plan:    Wound bed slough softening and retracting from wound edges--improving with no per wound erythema  Continue wound care as above--RN will monitor and notify for any concerns--followup next week        (L89.890) Pressure ulcer of toe of right foot, unstageable (H)  (primary encounter diagnosis)      (G30.9,  F02.80) Alzheimer's disease (H)      (Z59.3) Living in assisted living      (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

## 2023-09-26 DIAGNOSIS — D50.9 IRON DEFICIENCY ANEMIA, UNSPECIFIED IRON DEFICIENCY ANEMIA TYPE: ICD-10-CM

## 2023-09-27 RX ORDER — FERROUS GLUCONATE 324(38)MG
TABLET ORAL
Qty: 45 TABLET | Refills: 2 | Status: SHIPPED | OUTPATIENT
Start: 2023-09-27 | End: 2023-09-28

## 2023-09-28 NOTE — TELEPHONE ENCOUNTER
Message from pharmacy:  This is the current order we have for Virginia. Fax doctor for directions stating Mon, Wed, Fri, so we can bill properly.     Disp Refills Start End IOANA   ferrous gluconate (FERGON) 324 (38 Fe) MG tablet 45 tablet 2 9/27/2023  No   Sig: TAKE 1 TABLET BY MOUTH EVERY OTHER DAY     Pending as requested.    Martina Hatfield RN .............. 9/28/2023  3:01 PM

## 2023-09-29 RX ORDER — FERROUS GLUCONATE 324(38)MG
324 TABLET ORAL EVERY OTHER DAY
Qty: 36 TABLET | Refills: 2 | Status: SHIPPED | OUTPATIENT
Start: 2023-09-29 | End: 2024-05-20

## 2023-10-13 DIAGNOSIS — F02.811 ALZHEIMER'S DEMENTIA WITH AGITATION, UNSPECIFIED DEMENTIA SEVERITY, UNSPECIFIED TIMING OF DEMENTIA ONSET (H): ICD-10-CM

## 2023-10-13 DIAGNOSIS — R45.1 AGITATION: ICD-10-CM

## 2023-10-13 DIAGNOSIS — G30.9 ALZHEIMER'S DEMENTIA WITH AGITATION, UNSPECIFIED DEMENTIA SEVERITY, UNSPECIFIED TIMING OF DEMENTIA ONSET (H): ICD-10-CM

## 2023-10-13 DIAGNOSIS — F43.22 ADJUSTMENT DISORDER WITH ANXIOUS MOOD: Primary | ICD-10-CM

## 2023-10-13 RX ORDER — LORAZEPAM 0.5 MG/1
0.25 TABLET ORAL 2 TIMES DAILY PRN
Qty: 12 TABLET | Refills: 0 | Status: SHIPPED | OUTPATIENT
Start: 2023-10-13 | End: 2023-11-17

## 2023-10-26 ENCOUNTER — MEDICAL CORRESPONDENCE (OUTPATIENT)
Dept: HEALTH INFORMATION MANAGEMENT | Facility: OTHER | Age: 88
End: 2023-10-26
Payer: MEDICARE

## 2023-11-17 DIAGNOSIS — F43.22 ADJUSTMENT DISORDER WITH ANXIOUS MOOD: ICD-10-CM

## 2023-11-17 DIAGNOSIS — R45.1 AGITATION: ICD-10-CM

## 2023-11-17 DIAGNOSIS — F02.811 ALZHEIMER'S DEMENTIA WITH AGITATION, UNSPECIFIED DEMENTIA SEVERITY, UNSPECIFIED TIMING OF DEMENTIA ONSET (H): ICD-10-CM

## 2023-11-17 DIAGNOSIS — F02.84 ALZHEIMER'S DEMENTIA OF OTHER ONSET, WITH ANXIETY, UNSPECIFIED DEMENTIA SEVERITY (H): Primary | ICD-10-CM

## 2023-11-17 DIAGNOSIS — G30.8 ALZHEIMER'S DEMENTIA OF OTHER ONSET, WITH ANXIETY, UNSPECIFIED DEMENTIA SEVERITY (H): Primary | ICD-10-CM

## 2023-11-17 DIAGNOSIS — G30.9 ALZHEIMER'S DEMENTIA WITH AGITATION, UNSPECIFIED DEMENTIA SEVERITY, UNSPECIFIED TIMING OF DEMENTIA ONSET (H): ICD-10-CM

## 2023-11-17 RX ORDER — LORAZEPAM 0.5 MG/1
TABLET ORAL
Qty: 40 TABLET | Refills: 4 | Status: SHIPPED | OUTPATIENT
Start: 2023-11-17 | End: 2024-01-11

## 2024-01-11 DIAGNOSIS — G30.9 ALZHEIMER'S DEMENTIA WITH AGITATION, UNSPECIFIED DEMENTIA SEVERITY, UNSPECIFIED TIMING OF DEMENTIA ONSET (H): ICD-10-CM

## 2024-01-11 DIAGNOSIS — F43.22 ADJUSTMENT DISORDER WITH ANXIOUS MOOD: ICD-10-CM

## 2024-01-11 DIAGNOSIS — F02.84 ALZHEIMER'S DEMENTIA OF OTHER ONSET, WITH ANXIETY, UNSPECIFIED DEMENTIA SEVERITY (H): ICD-10-CM

## 2024-01-11 DIAGNOSIS — G30.8 ALZHEIMER'S DEMENTIA OF OTHER ONSET, WITH ANXIETY, UNSPECIFIED DEMENTIA SEVERITY (H): ICD-10-CM

## 2024-01-11 DIAGNOSIS — F02.811 ALZHEIMER'S DEMENTIA WITH AGITATION, UNSPECIFIED DEMENTIA SEVERITY, UNSPECIFIED TIMING OF DEMENTIA ONSET (H): ICD-10-CM

## 2024-01-11 DIAGNOSIS — R45.1 AGITATION: ICD-10-CM

## 2024-01-11 RX ORDER — LORAZEPAM 0.5 MG/1
TABLET ORAL
Qty: 40 TABLET | Refills: 5 | Status: SHIPPED | OUTPATIENT
Start: 2024-01-11 | End: 2024-07-25

## 2024-01-29 ENCOUNTER — TELEPHONE (OUTPATIENT)
Dept: FAMILY MEDICINE | Facility: OTHER | Age: 89
End: 2024-01-29
Payer: MEDICARE

## 2024-01-29 ENCOUNTER — LAB (OUTPATIENT)
Dept: LAB | Facility: OTHER | Age: 89
End: 2024-01-29
Attending: FAMILY MEDICINE
Payer: MEDICARE

## 2024-01-29 DIAGNOSIS — G30.9 ALZHEIMER'S DEMENTIA, UNSPECIFIED DEMENTIA SEVERITY, UNSPECIFIED TIMING OF DEMENTIA ONSET, UNSPECIFIED WHETHER BEHAVIORAL, PSYCHOTIC, OR MOOD DISTURBANCE OR ANXIETY (H): ICD-10-CM

## 2024-01-29 DIAGNOSIS — R30.0 DYSURIA: ICD-10-CM

## 2024-01-29 DIAGNOSIS — F02.80 ALZHEIMER'S DEMENTIA, UNSPECIFIED DEMENTIA SEVERITY, UNSPECIFIED TIMING OF DEMENTIA ONSET, UNSPECIFIED WHETHER BEHAVIORAL, PSYCHOTIC, OR MOOD DISTURBANCE OR ANXIETY (H): ICD-10-CM

## 2024-01-29 DIAGNOSIS — R30.0 DYSURIA: Primary | ICD-10-CM

## 2024-01-29 DIAGNOSIS — N30.01 ACUTE CYSTITIS WITH HEMATURIA: Primary | ICD-10-CM

## 2024-01-29 DIAGNOSIS — N30.01 ACUTE CYSTITIS WITH HEMATURIA: ICD-10-CM

## 2024-01-29 DIAGNOSIS — Z78.9 LIVING IN ASSISTED LIVING: ICD-10-CM

## 2024-01-29 LAB
ALBUMIN UR-MCNC: 100 MG/DL
APPEARANCE UR: ABNORMAL
BACTERIA #/AREA URNS HPF: ABNORMAL /HPF
BILIRUB UR QL STRIP: ABNORMAL
COLOR UR AUTO: YELLOW
GLUCOSE UR STRIP-MCNC: NEGATIVE MG/DL
HGB UR QL STRIP: ABNORMAL
KETONES UR STRIP-MCNC: ABNORMAL MG/DL
LEUKOCYTE ESTERASE UR QL STRIP: ABNORMAL
NITRATE UR QL: POSITIVE
PH UR STRIP: 5 [PH] (ref 5–9)
RBC URINE: >182 /HPF
SP GR UR STRIP: 1.02 (ref 1–1.03)
SQUAMOUS EPITHELIAL: 4 /HPF
UROBILINOGEN UR STRIP-MCNC: NORMAL MG/DL
WBC CLUMPS #/AREA URNS HPF: PRESENT /HPF
WBC URINE: >182 /HPF

## 2024-01-29 PROCEDURE — 87186 SC STD MICRODIL/AGAR DIL: CPT | Mod: ZL

## 2024-01-29 PROCEDURE — 81001 URINALYSIS AUTO W/SCOPE: CPT | Mod: ZL

## 2024-01-29 PROCEDURE — 87086 URINE CULTURE/COLONY COUNT: CPT | Mod: ZL

## 2024-01-29 RX ORDER — CIPROFLOXACIN 250 MG/1
250 TABLET, FILM COATED ORAL 2 TIMES DAILY
Qty: 14 TABLET | Refills: 0 | Status: SHIPPED | OUTPATIENT
Start: 2024-01-29 | End: 2024-01-31 | Stop reason: ALTCHOICE

## 2024-01-29 NOTE — TELEPHONE ENCOUNTER
"Carline Verde RN calling to request UA  For \"pink urine\"--reports vitals stable, bowel habits at baseline--no evidence of active bleeding  Taking POs normally  Staff feel may be dysuric--no abdominal pain  Or vomiting--afebrile    Will send UA with reflex    Akiko Michel, APRN CNP   January 29, 2024     "

## 2024-01-31 DIAGNOSIS — N30.01 ACUTE CYSTITIS WITH HEMATURIA: Primary | ICD-10-CM

## 2024-01-31 LAB — BACTERIA UR CULT: ABNORMAL

## 2024-01-31 RX ORDER — DOXYCYCLINE HYCLATE 100 MG
100 TABLET ORAL 2 TIMES DAILY
Qty: 14 TABLET | Refills: 0 | Status: SHIPPED | OUTPATIENT
Start: 2024-01-31 | End: 2024-02-07

## 2024-02-01 ENCOUNTER — NURSING HOME VISIT (OUTPATIENT)
Dept: GERIATRICS | Facility: OTHER | Age: 89
End: 2024-02-01
Attending: NURSE PRACTITIONER
Payer: MEDICARE

## 2024-02-01 VITALS
WEIGHT: 103 LBS | HEART RATE: 88 BPM | SYSTOLIC BLOOD PRESSURE: 140 MMHG | BODY MASS INDEX: 18.25 KG/M2 | DIASTOLIC BLOOD PRESSURE: 72 MMHG | OXYGEN SATURATION: 96 %

## 2024-02-01 DIAGNOSIS — F03.918 SENILE DEMENTIA, WITH BEHAVIORAL DISTURBANCE (H): ICD-10-CM

## 2024-02-01 DIAGNOSIS — F41.1 GAD (GENERALIZED ANXIETY DISORDER): ICD-10-CM

## 2024-02-01 DIAGNOSIS — N30.01 ACUTE CYSTITIS WITH HEMATURIA: ICD-10-CM

## 2024-02-01 DIAGNOSIS — L89.890 PRESSURE ULCER OF TOE OF RIGHT FOOT, UNSTAGEABLE (H): ICD-10-CM

## 2024-02-01 DIAGNOSIS — G30.9 ALZHEIMER'S DISEASE (H): ICD-10-CM

## 2024-02-01 DIAGNOSIS — F02.80 ALZHEIMER'S DISEASE (H): ICD-10-CM

## 2024-02-01 DIAGNOSIS — N18.30 STAGE 3 CHRONIC KIDNEY DISEASE, UNSPECIFIED WHETHER STAGE 3A OR 3B CKD (H): ICD-10-CM

## 2024-02-01 DIAGNOSIS — Z99.3 DEPENDENT FOR WHEELCHAIR MOBILITY: ICD-10-CM

## 2024-02-01 DIAGNOSIS — J44.9 CHRONIC OBSTRUCTIVE PULMONARY DISEASE, UNSPECIFIED COPD TYPE (H): ICD-10-CM

## 2024-02-01 DIAGNOSIS — Z79.899 ENCOUNTER FOR MEDICATION REVIEW: Primary | ICD-10-CM

## 2024-02-01 DIAGNOSIS — Z78.9 LIVING IN ASSISTED LIVING: ICD-10-CM

## 2024-02-01 PROCEDURE — 99347 HOME/RES VST EST SF MDM 20: CPT | Performed by: NURSE PRACTITIONER

## 2024-02-01 NOTE — PROGRESS NOTES
Virginia Leone is a 89 year old female being seen today for acute and follow up visit at [facility].    Code Status: Advance Directives: YES  Health Care Power of : Extended Emergency Contact Information  Primary Emergency Contact: Mickie Jack  Home Phone: [phone]  Mobile Phone: [phone]  Relation: Daughter  Secondary Emergency Contact: Lyudmila Jamison  Home Phone: [phone]  Mobile Phone: [phone]  Relation: Daughter     Allergies: Penicillins and Latex     Chief Complaint / HPI: Follow-up on recent bladder infection and medication review.  Resides in assisted living, wheelchair dependent for mobility  Assistance of 1 staff for transfers to toilet and bed.  Had started Ceftin based on allergies and previous cultures, however resistant and will need medication change.  Staff report symptoms of dysuria perianal discomfort--she is incontinent bowel and bladder and due to advanced dementia unable to provide any history  All historical information provided by nursing staff and chart review.  Had started a PRN low-dose Ativan for reports of inconsolable anxiety particularly late afternoon and early evening--staff report that the frequency of need has been greatly reduced--I suspect her stage of dementia is past that level of anxiety and agitation.  Appears comfortable and weights reviewed actually stable and thriving well on current facility  No other concerns raised today    Past Medical, Surgical, Family and Social History reviewed: YES.     Medications: Reviewed  Medications - recent changes: Ceftin    Review of Systems:  General: positive for weakness  Skin: positive for Pressure injury hammer toe  GI: positive for incontinent  : positive for incontinence  Musculoskeletal: positive for arthritis, joint stiffness, and muscular weakness  Neurologic: positive for memory problems  Psychiatric: positive for anxiety  All other systems negative  Toileting:    Continent of Bowel: No   Continent  of Bladder: No  Mobility: wheelchair    Recent Labs:   Recent Results (from the past 240 hour(s))   UA Macroscopic with reflex to Microscopic and Culture    Collection Time: 01/29/24  4:36 PM    Specimen: Urine, Clean Catch   Result Value Ref Range    Color Urine Yellow Colorless, Straw, Light Yellow, Yellow    Appearance Urine Cloudy (A) Clear    Glucose Urine Negative Negative mg/dL    Bilirubin Urine Small (A) Negative    Ketones Urine Trace (A) Negative mg/dL    Specific Gravity Urine 1.025 1.005 - 1.030    Blood Urine Large (A) Negative    pH Urine 5.0 5.0 - 9.0    Protein Albumin Urine 100 (A) Negative mg/dL    Urobilinogen Urine Normal Normal, 2.0 mg/dL    Nitrite Urine Positive (A) Negative    Leukocyte Esterase Urine Moderate (A) Negative    Bacteria Urine Few (A) None Seen /HPF    WBC Clumps Urine Present (A) None Seen /HPF    RBC Urine >182 (H) <=2 /HPF    WBC Urine >182 (H) <=5 /HPF    Squamous Epithelials Urine 4 (H) <=1 /HPF   Urine Culture    Collection Time: 01/29/24  4:36 PM    Specimen: Urine, Clean Catch   Result Value Ref Range    Culture 50,000-100,000 CFU/mL Escherichia coli (A)        Susceptibility    Escherichia coli - UMU     Amoxicillin/Clavulanate 16 Intermediate      Ampicillin >16 Resistant      Ampicillin/ Sulbactam >16 Resistant      Aztreonam <=4 Susceptible      Cefazolin 4 Susceptible      Cefepime <=2 Susceptible      Ceftazidime <=1 Susceptible      Ceftriaxone <=1 Susceptible      Cefoxitin <=8 Susceptible      Ciprofloxacin >2 Resistant      Ertapenem <=0.5 Susceptible      Gentamicin <=4 Susceptible      Imipenem <=1 Susceptible      Levofloxacin >4 Resistant      Nitrofurantoin <=32 Susceptible      Piperacillin/Tazobactam*         * Piperacillin/Tazobactam not resistant.  Due to test limitations, lab cannot provide UMU to determine susceptibility.     Tetracycline <=4 Susceptible      Tobramycin <=4 Susceptible      Trimethoprim/Sulfamethoxazole <=2/38 Susceptible       Cefpodoxime <=2 Susceptible      Cefuroxime 16 Intermediate      Trimethoprim <=8 Susceptible         Current Therapies: none    Exam:  Vital signs reviewed. See above  GENERAL APPEARANCE:  alert and no distress  EYES: Eyes grossly normal to inspection  RESP: lungs clear   ABDOMEN: soft, nontender  MS: wheelchair dependent  SKIN: Right dorsum hammertoe pressure area clean, dry--no erythema or evidence of active infection--stable--treated with betadine wash  NEURO: Alert,mentation intact and speech normal--baseline confusion  PSYCH:affect at baseline    Assessment and Plan:    Based on culture results and renal function antibiotic changed to doxycycline due to resistance to Ceftin.  Have asked staff to encourage clear fluids intake and diligent perianal hygiene.  Challenging to ascertain UTI versus colonization with advanced dementia and incontinence.  At this point need to base symptom feedback on nursing observations.    Less anxiety and less need for PRNs--staff feel when this is utilize it is effective and she is not ambulatory.  No refills needed at this time.    Overall quality of life and mobility are optimized--no further interventions needed  Chronic hammertoe pressure area healed and stable--staff will continue to paint area with bandaging and monitor  Current slippers and shoes appear wide enough toe box to minimize contact.    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

## 2024-02-05 PROBLEM — F03.918 SENILE DEMENTIA, WITH BEHAVIORAL DISTURBANCE (H): Status: ACTIVE | Noted: 2024-02-05

## 2024-02-05 PROBLEM — J44.9 CHRONIC OBSTRUCTIVE PULMONARY DISEASE, UNSPECIFIED COPD TYPE (H): Status: ACTIVE | Noted: 2024-02-05

## 2024-02-16 ENCOUNTER — MEDICAL CORRESPONDENCE (OUTPATIENT)
Dept: HEALTH INFORMATION MANAGEMENT | Facility: OTHER | Age: 89
End: 2024-02-16
Payer: MEDICARE

## 2024-02-21 DIAGNOSIS — G30.9 ALZHEIMER'S DISEASE (H): Primary | ICD-10-CM

## 2024-02-21 DIAGNOSIS — F02.80 ALZHEIMER'S DISEASE (H): Primary | ICD-10-CM

## 2024-02-21 DIAGNOSIS — G30.9 ALZHEIMER'S DISEASE (H): ICD-10-CM

## 2024-02-21 DIAGNOSIS — F02.80 ALZHEIMER'S DISEASE (H): ICD-10-CM

## 2024-02-23 RX ORDER — CHOLECALCIFEROL (VITAMIN D3) 25 MCG
1 TABLET ORAL EVERY EVENING
Qty: 90 TABLET | Refills: 1 | Status: SHIPPED | OUTPATIENT
Start: 2024-02-23 | End: 2024-08-12

## 2024-02-23 RX ORDER — ASPIRIN 81 MG
81 TABLET,CHEWABLE ORAL DAILY
Qty: 90 TABLET | Refills: 1 | Status: SHIPPED | OUTPATIENT
Start: 2024-02-23 | End: 2024-08-12

## 2024-02-23 NOTE — TELEPHONE ENCOUNTER
[facility] sent Rx request for the following:      Requested Prescriptions   Pending Prescriptions Disp Refills    ASPIRIN LOW DOSE 81 MG chewable tablet [Pharmacy Med Name: Aspirin 81 MG Oral Tablet Chewable] 90 tablet 1     Sig: Take 1 Tablet by mouth one time a day.       Analgesics (Non-Narcotic Tylenol and ASA Only) Failed - 2/23/2024  8:44 AM        Failed - Recent (12 mo) or future (90 days) visit within the authorizing provider's specialty     The patient must have completed an in-person or virtual visit within the past 12 months or has a future visit scheduled within the next 90 days with the authorizing provider s specialty.  Urgent care and e-visits do not quality as an office visit for this protocol.         Last Prescription Date:   8/19/23  Last Fill Qty/Refills:         90, R-1    Last Office Visit:              2/1/24 [facility]   Future Office visit:           none    Routing refill request to provider for review/approval because:  Drug not on the [facility] refill protocol     Tammie Gloria RN on 2/23/2024 at 8:45 AM

## 2024-02-23 NOTE — TELEPHONE ENCOUNTER
[facility] sent Rx request for the following:      Requested Prescriptions   Pending Prescriptions Disp Refills    VITAMIN D3 25 MCG (1000 UT) tablet [Pharmacy Med Name: Vitamin D3 25 MCG Oral Tablet] 90 tablet 1     Sig: Take 1 Tablet by mouth every evening.       Vitamin Supplements Protocol Failed - 2/21/2024  4:20 PM        Failed - Medication indicated for associated diagnosis     The medication is prescribed for one or more of the following conditions:     Vitamin deficiency            Failed - Recent (12 mo) or future (90 days) visit within the authorizing provider's specialty     The patient must have completed an in-person or virtual visit within the past 12 months or has a future visit scheduled within the next 90 days with the authorizing provider s specialty.  Urgent care and e-visits do not quality as an office visit for this protocol.         Last Prescription Date:   8/19/23  Last Fill Qty/Refills:         90, R-1    Last Office Visit:              2/1/24 [facility]   Future Office visit:           none    Routing refill request to provider for review/approval because:  Drug not on the [facility] refill protocol     Tammie Gloria RN on 2/23/2024 at 2:54 PM

## 2024-03-14 ENCOUNTER — MEDICAL CORRESPONDENCE (OUTPATIENT)
Dept: HEALTH INFORMATION MANAGEMENT | Facility: OTHER | Age: 89
End: 2024-03-14
Payer: MEDICARE

## 2024-03-14 DIAGNOSIS — B37.2 INTERTRIGINOUS CANDIDIASIS: Primary | ICD-10-CM

## 2024-03-14 RX ORDER — NYSTATIN 100000 [USP'U]/G
POWDER TOPICAL 2 TIMES DAILY
Qty: 60 G | Refills: 2 | Status: SHIPPED | OUTPATIENT
Start: 2024-03-14

## 2024-03-21 DIAGNOSIS — I10 ESSENTIAL HYPERTENSION: ICD-10-CM

## 2024-03-21 RX ORDER — LOSARTAN POTASSIUM 50 MG/1
TABLET ORAL
Qty: 90 TABLET | Refills: 3 | Status: SHIPPED | OUTPATIENT
Start: 2024-03-21

## 2024-03-21 NOTE — TELEPHONE ENCOUNTER
Requested Prescriptions   Pending Prescriptions Disp Refills    losartan (COZAAR) 50 MG tablet [Pharmacy Med Name: Losartan Potassium 50 MG Oral Tablet (Cozaar)] 90 tablet 3     Sig: Take 1 Tablet by mouth one time a day.  Hold systolic 110 or less       Angiotensin-II Receptors Failed - 3/21/2024 10:39 AM        Failed - Last blood pressure under 140/90 in past 12 months     BP Readings from Last 3 Encounters:   03/07/24 (!) 144/65   02/01/24 (!) 140/72   04/13/23 100/62                 Failed - Has GFR on file in past 12 months and most recent value is normal        Failed - Recent (12 mo) or future (90days) visit within the authorizing provider's specialty     The patient must have completed an in-person or virtual visit within the past 12 months or has a future visit scheduled within the next 90 days with the authorizing provider s specialty.  Urgent care and e-visits do not quality as an office visit for this protocol.          Passed - Medication is active on med list        Passed - Medication indicated for associated diagnosis     The medication is prescribed for one or more of the following conditions:    Chronic Kidney Disease (CDK)    Heart Failure (HF)    Diabetes, Nephropathy   Hypertension    Coronary Artery Disease (CAD)   Raynaud's Disease          Passed - Patient is age 18 or older        Passed - No active pregnancy on record        Passed - Normal serum potassium on file in past 12 months     Recent Labs   Lab Test 04/20/23  0751   POTASSIUM 5.3                    Passed - No positive pregnancy test in past 12 months              LOV: 2/1/2024 MIGUEL Michel NP [facility]  Future Office visit:  No future appointment scheduled at this time.     Routing refill request to provider for review/approval.        Berta Olmstead RN  ....................  3/21/2024   12:15 PM

## 2024-04-22 DIAGNOSIS — Z79.899 ENCOUNTER FOR MEDICATION REVIEW: ICD-10-CM

## 2024-04-24 RX ORDER — PSEUDOEPHED/ACETAMINOPH/DIPHEN 30MG-500MG
TABLET ORAL
Qty: 180 TABLET | Refills: 11 | Status: SHIPPED | OUTPATIENT
Start: 2024-04-24 | End: 2024-04-24

## 2024-04-24 RX ORDER — PSEUDOEPHED/ACETAMINOPH/DIPHEN 30MG-500MG
TABLET ORAL
Qty: 180 TABLET | Refills: 11 | Status: SHIPPED | OUTPATIENT
Start: 2024-04-24

## 2024-04-24 NOTE — TELEPHONE ENCOUNTER
[facility] sent Rx request for the following:      Requested Prescriptions   Pending Prescriptions Disp Refills    acetaminophen (TYLENOL) 500 MG tablet [Pharmacy Med Name: Acetaminophen 500 MG Oral Tablet (Tylenol)] 180 tablet 11     Sig: Take 2 Tablets by mouth two times a day; Take 2 Tablets by mouth once daily as needed for pain.       Analgesics (Non-Narcotic Tylenol and ASA Only) Failed - 4/24/2024 12:11 PM        Failed - Medication matches indication     Acetaminophen is associated with one or more of the following diagnoses:  Pain  Fever          Failed - Recent (12 mo) or future (90 days) visit within the authorizing provider's specialty     The patient must have completed an in-person or virtual visit within the past 12 months or has a future visit scheduled within the next 90 days with the authorizing provider s specialty.  Urgent care and e-visits do not quality as an office visit for this protocol.         Last Prescription Date:   4/14/23  Last Fill Qty/Refills:         180, R-11    Last Office Visit:              2/1/24 (MIGUEL Michel)    Future Office visit:           none    Routing refill request to provider for review/approval.    Maria G Olivarez RN on 4/24/2024 at 12:57 PM

## 2024-04-24 NOTE — TELEPHONE ENCOUNTER
acetaminophen (TYLENOL) 500 MG tablet 180 tablet 11 4/24/2024 -- No   Sig: Take 2 Tablets by mouth two times a day; Take 2 Tablets by mouth once daily as needed for pain.   Sent to pharmacy as: Acetaminophen Extra Strength 500 MG Oral Tablet (TYLENOL)   Class: E-Prescribe   Order: [id number]   E-Prescribing Status: Transmission to pharmacy failed (4/24/2024  1:04 PM CDT)     [facility], [address]     Order resent.    E-Prescribing Status: Receipt confirmed by pharmacy (4/24/2024  1:27 PM CDT)     Martina Hatfield RN .............. 4/24/2024  1:27 PM

## 2024-05-14 DIAGNOSIS — D50.9 IRON DEFICIENCY ANEMIA, UNSPECIFIED IRON DEFICIENCY ANEMIA TYPE: ICD-10-CM

## 2024-05-17 NOTE — TELEPHONE ENCOUNTER
[facility]  sent Rx request for the following:      Requested Prescriptions   Pending Prescriptions Disp Refills    ferrous gluconate (FERGON) 324 (38 Fe) MG tablet [Pharmacy Med Name: Ferrous Gluconate 324 (38 Fe) MG Oral Tablet] 36 tablet 2     Sig: Take 1 Tablet by mouth every Monday, Wednesday and Friday.       Iron Supplements Failed - 5/17/2024  7:45 AM        Failed - Hgb OR Hct on record within the past 12 mos.     Patient need only have had a HGB or HCT on file in the past 12 mos. That result does not need to be normal.    Recent Labs   Lab Test 04/13/23  1433 02/21/23  0551 02/20/23  0559   HGB 11.0* 10.1* 10.0*       Recent Labs   Lab Test 04/13/23  1433 02/21/23  0551 02/20/23  0559   HCT 32.5* 30.8* 30.4*     Please verify a HGB or HCT has been checked SINCE THE LAST DOSE CHANGE.        Failed - Recent (12 mo) or future (90 days) visit within the authorizing provider's specialty     The patient must have completed an in-person or virtual visit within the past 12 months or has a future visit scheduled within the next 90 days with the authorizing provider s specialty.  Urgent care and e-visits do not quality as an office visit for this protocol.     Last Prescription Date:   09/29/2023  Last Fill Qty/Refills:         36, R-2  Last Office Visit:              02/01/2024   Future Office visit:           None     Joya Deshpande RN on 5/17/2024 at 7:47 AM

## 2024-05-20 RX ORDER — FERROUS GLUCONATE 324(38)MG
TABLET ORAL
Qty: 36 TABLET | Refills: 2 | Status: SHIPPED | OUTPATIENT
Start: 2024-05-20

## 2024-06-18 DIAGNOSIS — M15.0 PRIMARY OSTEOARTHRITIS INVOLVING MULTIPLE JOINTS: ICD-10-CM

## 2024-06-18 DIAGNOSIS — R46.89 EPISODE OF BEHAVIOR CHANGE: ICD-10-CM

## 2024-06-19 NOTE — TELEPHONE ENCOUNTER
[facility] [place], [place] sent Rx request for the following:      Requested Prescriptions   Pending Prescriptions Disp Refills    ibuprofen (ADVIL/MOTRIN) 200 MG tablet 60 tablet 1     Sig: Take 1 tablet (200 mg) by mouth 2 times daily as needed for pain (with food)       NSAID Medications Failed - 6/19/2024  4:41 PM        Failed - Blood pressure under 140/90 in past 12 months     BP Readings from Last 3 Encounters:   03/07/24 (!) 144/65   02/01/24 (!) 140/72   04/13/23 100/62   No data recorded        Failed - Patient is age 6-64 years        Failed - Normal CBC on file in past 12 months     Recent Labs   Lab Test 04/13/23  1433   WBC 7.3   RBC 3.40*   HGB 11.0*   HCT 32.5*              Failed - Always Fail Criteria - Chart Review Required     Validate Diagnosis. If the medication is requested for an acute flare of a chronic pain associated with a musculoskeletal or rheumatologic condition; okay to authorize if all other criteria are met. If not, then forward to provider for review.        Failed - Normal GFR on file in past 12 months     Recent Labs   Lab Test 04/20/23  0751 11/22/22  1549 02/27/19  1509   GFRESTIMATED 51*   < > 53*   GFRESTBLACK  --   --  64    < > = values in this interval not displayed.           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    Routing to covering provider for refill consideration, as PCP/provider is out of clinic >48 hours or Pt is completely out of medication and provider is out of the clinic today.    Unable to complete prescription refill per RN Medication Refill Policy.  Martina Hatfield RN .............. 6/19/2024  4:42 PM

## 2024-06-20 RX ORDER — IBUPROFEN 200 MG
200 TABLET ORAL 2 TIMES DAILY PRN
Qty: 60 TABLET | Refills: 1 | Status: SHIPPED | OUTPATIENT
Start: 2024-06-20

## 2024-07-02 ENCOUNTER — MEDICAL CORRESPONDENCE (OUTPATIENT)
Dept: HEALTH INFORMATION MANAGEMENT | Facility: OTHER | Age: 89
End: 2024-07-02
Payer: MEDICARE

## 2024-07-24 DIAGNOSIS — F02.811 ALZHEIMER'S DEMENTIA WITH AGITATION, UNSPECIFIED DEMENTIA SEVERITY, UNSPECIFIED TIMING OF DEMENTIA ONSET (H): ICD-10-CM

## 2024-07-24 DIAGNOSIS — F43.22 ADJUSTMENT DISORDER WITH ANXIOUS MOOD: ICD-10-CM

## 2024-07-24 DIAGNOSIS — R45.1 AGITATION: ICD-10-CM

## 2024-07-24 DIAGNOSIS — G30.8 ALZHEIMER'S DEMENTIA OF OTHER ONSET, WITH ANXIETY, UNSPECIFIED DEMENTIA SEVERITY (H): ICD-10-CM

## 2024-07-24 DIAGNOSIS — G30.9 ALZHEIMER'S DEMENTIA WITH AGITATION, UNSPECIFIED DEMENTIA SEVERITY, UNSPECIFIED TIMING OF DEMENTIA ONSET (H): ICD-10-CM

## 2024-07-24 DIAGNOSIS — F02.84 ALZHEIMER'S DEMENTIA OF OTHER ONSET, WITH ANXIETY, UNSPECIFIED DEMENTIA SEVERITY (H): ICD-10-CM

## 2024-07-25 RX ORDER — LORAZEPAM 0.5 MG/1
TABLET ORAL
Qty: 40 TABLET | Refills: 0 | Status: SHIPPED | OUTPATIENT
Start: 2024-07-25 | End: 2024-07-29

## 2024-07-25 NOTE — TELEPHONE ENCOUNTER
[facility] sent Rx request for the following:      Requested Prescriptions   Pending Prescriptions Disp Refills    LORazepam (ATIVAN) 0.5 MG tablet [Pharmacy Med Name: LORazepam 0.5 MG Oral Tablet (Ativan)] 40 tablet 5     Sig: Take 0.5 (one-half) tablets by mouth two times a day as needed for anxiety or agitation       There is no refill protocol information for this order          Last Prescription Date:   1/11/24  Last Fill Qty/Refills:         40, R-5    Last Office Visit:              2/1/24   Future Office visit:         none on file     Unable to complete prescription refill per RN Medication Refill Policy. No protocol.   Merari Zarate RN on 7/25/2024 at 2:52 PM

## 2024-07-29 ENCOUNTER — NURSING HOME VISIT (OUTPATIENT)
Dept: GERIATRICS | Facility: OTHER | Age: 89
End: 2024-07-29
Attending: NURSE PRACTITIONER
Payer: MEDICARE

## 2024-07-29 VITALS
BODY MASS INDEX: 18.6 KG/M2 | WEIGHT: 105 LBS | DIASTOLIC BLOOD PRESSURE: 68 MMHG | OXYGEN SATURATION: 97 % | TEMPERATURE: 97.6 F | HEART RATE: 64 BPM | SYSTOLIC BLOOD PRESSURE: 144 MMHG

## 2024-07-29 DIAGNOSIS — Z99.3 DEPENDENT FOR WHEELCHAIR MOBILITY: ICD-10-CM

## 2024-07-29 DIAGNOSIS — R45.1 AGITATION: ICD-10-CM

## 2024-07-29 DIAGNOSIS — F02.811 ALZHEIMER'S DEMENTIA WITH AGITATION, UNSPECIFIED DEMENTIA SEVERITY, UNSPECIFIED TIMING OF DEMENTIA ONSET (H): ICD-10-CM

## 2024-07-29 DIAGNOSIS — G30.9 SEVERE ALZHEIMER'S DEMENTIA WITH ANXIETY, UNSPECIFIED TIMING OF DEMENTIA ONSET (H): Primary | ICD-10-CM

## 2024-07-29 DIAGNOSIS — Z51.5 PALLIATIVE CARE PATIENT: ICD-10-CM

## 2024-07-29 DIAGNOSIS — Z78.9 LIVING IN ASSISTED LIVING: ICD-10-CM

## 2024-07-29 DIAGNOSIS — G30.9 ALZHEIMER'S DEMENTIA WITH AGITATION, UNSPECIFIED DEMENTIA SEVERITY, UNSPECIFIED TIMING OF DEMENTIA ONSET (H): ICD-10-CM

## 2024-07-29 DIAGNOSIS — F43.22 ADJUSTMENT DISORDER WITH ANXIOUS MOOD: ICD-10-CM

## 2024-07-29 DIAGNOSIS — F02.84 ALZHEIMER'S DEMENTIA OF OTHER ONSET, WITH ANXIETY, UNSPECIFIED DEMENTIA SEVERITY (H): ICD-10-CM

## 2024-07-29 DIAGNOSIS — G30.8 ALZHEIMER'S DEMENTIA OF OTHER ONSET, WITH ANXIETY, UNSPECIFIED DEMENTIA SEVERITY (H): ICD-10-CM

## 2024-07-29 DIAGNOSIS — F02.C4 SEVERE ALZHEIMER'S DEMENTIA WITH ANXIETY, UNSPECIFIED TIMING OF DEMENTIA ONSET (H): Primary | ICD-10-CM

## 2024-07-29 PROCEDURE — 99348 HOME/RES VST EST LOW MDM 30: CPT | Performed by: NURSE PRACTITIONER

## 2024-07-29 RX ORDER — LORAZEPAM 0.5 MG/1
TABLET ORAL
Qty: 40 TABLET | Refills: 5 | Status: SHIPPED | OUTPATIENT
Start: 2024-07-29

## 2024-07-29 NOTE — PROGRESS NOTES
Virginia Leone is a 89 year old female being seen today for follow up visit at [facility].    Code Status: Advance Directives: YES-, Hospitalist to discuss with patient and family further.   Health Care Power of : Extended Emergency Contact Information  Primary Emergency Contact: Mickie Jack  Home Phone: [phone]  Mobile Phone: [phone]  Relation: Daughter  Secondary Emergency Contact: Lyudmila Jamison  Home Phone: [phone]  Mobile Phone: [phone]  Relation: Daughter     Allergies: Penicillins and Latex     Chief Complaint / HPI: Assisted living follow-up and review-nursing staff report resident has been at baseline--weight has been stable  She is nonambulatory wheelchair dependent for mobility and dependent on staff for transfers, toileting, bathing.  Advanced dementia confused at baseline unable to provide any history--all historical information provided by nursing staff  Nursing staff continue to paint pressure areas hammertoes dorsal surface of right foot metatarsals 2, 3 and 4 with Betadine--areas are clean and dry  Staff do not raise any other concerns    Past Medical, Surgical, Family and Social History reviewed: YES.     Medications: Reviewed  Medications - recent changes: none    Review of Systems:  General: positive for weakness  Skin: positive for Pressure injury wounds  : positive for incontinence  Musculoskeletal: positive for arthritis, joint pain, and muscular weakness  Neurologic: positive for memory problems  Psychiatric: positive for anxiety  All other systems negative  Toileting:    Continent of Bowel: No   Continent of Bladder: No  Mobility: felipe and wheelchair    Recent Labs: None      Current Therapies: none    Exam:        Vital signs reviewed.   GENERAL APPEARANCE: alert and no distress  EYES: Eyes grossly normal to inspection  RESP: lungs without any crackles, rhonchi or wheezes heard--unable to cough and take deep breath due to dementia  CV: regular rate and  rhythm, normal S1 S2, no S3 or S4, no murmur, click or rub, no peripheral edema and peripheral pulses strong  ABDOMEN: soft, nontender  MSK: Nonambulatory--sitting comfortably in wheelchair  SKIN: Less than half centimeter superficial pressure areas on prominent middle phalangeal joint hammertoe--no redness, tenderness, clean and dry painted with Betadine  No other pressure areas noted on exam  NEURO: Alert,mentation intact and speech normal, confused at baseline not a new finding  PSYCH: affect normal/bright    Assessment and Plan:    Follow-up with resident with advanced dementia who resides at an assisted living, nonambulatory  Superficial pressure areas dorsum of right foot from [facility] chronic and stable--staff continue to paint daily with Betadine and use offloading socks  Current placement very appropriate for meeting needs    Resident has intermittent issues with anxiety--she is nonambulatory and low-dose 0.25 mg Ativan as needed has been effective at reducing anxiety and self distress  She is dependent on staff for all ADLs--weight is stable and has been thriving well in current facility    Overall quality of life is optimal at this stage    Follow-up PRN    (G30.9,  F02.C4) Severe Alzheimer's dementia with anxiety, unspecified timing of dementia onset (H)  (primary encounter diagnosis)    Plan: LORazepam (ATIVAN) 0.5 MG tablet            (Z99.3) Dependent for wheelchair mobility    Plan: LORazepam (ATIVAN) 0.5 MG tablet            (Z59.3) Living in assisted living      (R45.1) Agitation    Plan: LORazepam (ATIVAN) 0.5 MG tablet            (F43.22) Adjustment disorder with anxious mood    Plan: LORazepam (ATIVAN) 0.5 MG tablet            (G30.9,  F02.811) Alzheimer's dementia with agitation, unspecified dementia severity, unspecified timing of dementia onset (H)    Plan: LORazepam (ATIVAN) 0.5 MG tablet            (G30.8,  F02.84) Alzheimer's dementia of other onset, with anxiety, unspecified dementia  severity (H)      (Z51.5) Palliative care patient    Plan: LORazepam (ATIVAN) 0.5 MG tablet

## 2024-08-01 ENCOUNTER — DOCUMENTATION ONLY (OUTPATIENT)
Dept: OTHER | Facility: CLINIC | Age: 89
End: 2024-08-01
Payer: MEDICARE

## 2024-08-08 DIAGNOSIS — I10 ESSENTIAL HYPERTENSION: ICD-10-CM

## 2024-08-08 DIAGNOSIS — F02.80 ALZHEIMER'S DISEASE (H): ICD-10-CM

## 2024-08-08 DIAGNOSIS — E87.6 HYPOKALEMIA: ICD-10-CM

## 2024-08-08 DIAGNOSIS — F41.8 DEPRESSION WITH ANXIETY: ICD-10-CM

## 2024-08-08 DIAGNOSIS — G30.9 ALZHEIMER'S DISEASE (H): ICD-10-CM

## 2024-08-08 DIAGNOSIS — K21.9 GASTROESOPHAGEAL REFLUX DISEASE WITHOUT ESOPHAGITIS: ICD-10-CM

## 2024-08-12 RX ORDER — CHOLECALCIFEROL (VITAMIN D3) 25 MCG
1 TABLET ORAL EVERY EVENING
Qty: 90 TABLET | Refills: 3 | Status: SHIPPED | OUTPATIENT
Start: 2024-08-12

## 2024-08-12 RX ORDER — VENLAFAXINE HYDROCHLORIDE 75 MG/1
CAPSULE, EXTENDED RELEASE ORAL
Qty: 90 CAPSULE | Refills: 3 | Status: SHIPPED | OUTPATIENT
Start: 2024-08-12

## 2024-08-12 RX ORDER — ASPIRIN 81 MG
81 TABLET,CHEWABLE ORAL DAILY
Qty: 90 TABLET | Refills: 3 | Status: SHIPPED | OUTPATIENT
Start: 2024-08-12

## 2024-08-12 RX ORDER — POTASSIUM CHLORIDE 750 MG/1
10 TABLET, EXTENDED RELEASE ORAL DAILY
Qty: 90 TABLET | Refills: 3 | Status: SHIPPED | OUTPATIENT
Start: 2024-08-12

## 2024-08-12 NOTE — TELEPHONE ENCOUNTER
[facility] [place], [place] sent Rx request for the following:      Requested Prescriptions   Pending Prescriptions Disp Refills    VITAMIN D3 25 MCG (1000 UT) tablet [Pharmacy Med Name: Vitamin D3 25 MCG (1000 UT) Oral Tablet] 90 tablet 1     Sig: Take 1 Tablet by mouth every evening.       Vitamin Supplements Protocol Failed - 8/12/2024 11:34 AM        Failed - Medication indicated for associated diagnosis     The medication is prescribed for one or more of the following conditions:     Vitamin deficiency             Last Prescription Date:   2/23/24  Last Fill Qty/Refills:         90, R-1    Last Office Visit:              7/29/24 ([facility] Visit, Akiko Michel)   Future Office visit:           None     Unable to complete prescription refill per RN Medication Refill Policy. Martina Hatfield RN .............. 8/12/2024  11:33 AM

## 2024-08-12 NOTE — TELEPHONE ENCOUNTER
Requested Prescriptions   Pending Prescriptions Disp Refills    omeprazole (PRILOSEC) 20 MG DR capsule [Pharmacy Med Name: Omeprazole 20 MG Oral Capsule Delayed Release (PriLOSEC)] 90 capsule 2     Sig: Take 1 Capsule by mouth one time a day.   Last Prescription Date:   11/8/22  Last Fill Qty/Refills:         90, R-3                     ASPIRIN LOW DOSE 81 MG chewable tablet [Pharmacy Med Name: Aspirin 81 MG Oral Tablet Chewable (Aspirin Low Dose)] 90 tablet 1     Sig: Take 1 Tablet by mouth one time a day.   Last Prescription Date:   2/23/24  Last Fill Qty/Refills:         90, R-1                     potassium chloride negra ER (KLOR-CON M10) 10 MEQ CR tablet [Pharmacy Med Name: Potassium Chloride Negra ER 10 MEQ Oral Tablet Extended Release (K-Dur, Klor-Con M)] 90 tablet 2     Sig: Take 1 Tablet by mouth one time a day.   Last Prescription Date:   11/22/22  Last Fill Qty/Refills:         90, R-3      Potassium Supplements Protocol Failed - 8/12/2024 11:16 AM                Failed - Normal serum potassium in past 12 months     Recent Labs   Lab Test 04/20/23  0751   POTASSIUM 5.3          venlafaxine (EFFEXOR XR) 75 MG 24 hr capsule [Pharmacy Med Name: Venlafaxine HCl ER 75 MG Oral Capsule Extended Release 24 Hour (Effexor-XR)] 90 capsule 2     Sig: Take 1 Capsule by mouth one time a day.   Last Prescription Date:   11/8/22  Last Fill Qty/Refills:         90, R-3      Serotonin-Norepinephrine Reuptake Inhibitors  Failed - 8/12/2024 11:16 AM        Failed - Blood pressure under 140/90 in past 12 months     BP Readings from Last 3 Encounters:   07/29/24 (!) 144/68   03/07/24 (!) 144/65   02/01/24 (!) 140/72   No data recorded        Failed - PHQ-9 score of less than 5 in past 6 months     Please review last PHQ-9 score.         Failed - KRISTIAN-7 score of less than 5 in past 6 months.     Please review last KRISTIAN-7 score.              Last Office Visit:              7/29/24 (NH Visit, Akiko Michel)   Future Office visit:            None    Unable to complete prescription refill per RN Medication Refill Policy. Martina Hatfield RN .............. 8/12/2024  11:33 AM

## 2024-09-24 ENCOUNTER — NURSING HOME VISIT (OUTPATIENT)
Dept: GERIATRICS | Facility: OTHER | Age: 89
End: 2024-09-24
Attending: NURSE PRACTITIONER
Payer: MEDICARE

## 2024-09-24 VITALS — WEIGHT: 105 LBS | BODY MASS INDEX: 18.6 KG/M2

## 2024-09-24 DIAGNOSIS — M20.42 HAMMERTOES OF BOTH FEET: ICD-10-CM

## 2024-09-24 DIAGNOSIS — Z99.3 WHEELCHAIR DEPENDENCE: ICD-10-CM

## 2024-09-24 DIAGNOSIS — G30.9 SEVERE ALZHEIMER'S DEMENTIA WITH ANXIETY, UNSPECIFIED TIMING OF DEMENTIA ONSET (H): Primary | ICD-10-CM

## 2024-09-24 DIAGNOSIS — Z78.9 LIVING IN ASSISTED LIVING: ICD-10-CM

## 2024-09-24 DIAGNOSIS — L89.890 PRESSURE ULCER OF TOE OF RIGHT FOOT, UNSTAGEABLE (H): ICD-10-CM

## 2024-09-24 DIAGNOSIS — F02.C4 SEVERE ALZHEIMER'S DEMENTIA WITH ANXIETY, UNSPECIFIED TIMING OF DEMENTIA ONSET (H): Primary | ICD-10-CM

## 2024-09-24 DIAGNOSIS — M20.41 HAMMERTOES OF BOTH FEET: ICD-10-CM

## 2024-09-24 PROCEDURE — 99347 HOME/RES VST EST SF MDM 20: CPT | Performed by: NURSE PRACTITIONER

## 2024-09-24 NOTE — PROGRESS NOTES
Virginia Medrano is a 89 year old female being seen today for follow up visit at [facility].    Code Status:  Advance Directives: YES-  Health Care Power of : Extended Emergency Contact Information  Primary Emergency Contact: Krystian Medrano  Address: [address] [place] [address] [address] [place] [address]  Home Phone: [phone]  Mobile Phone: [phone]  Relation: Son  Secondary Emergency Contact: Mickie Jack  Home Phone: [phone]  Mobile Phone: [phone]  Relation: Daughter     Allergies: Penicillins and Latex     Chief Complaint / HPI: Follow-up with resident at assisted living facility for review of medications and standing orders--- follow-up on chronic hammertoes and maintenance treatment with painting areas with Betadine  To keep clean and dry--previously had weeping ulcers which have healed over a year ago--she is not ambulatory --wheelchair for mobility and staff assist with all ADLs--- able to self feed with finger foods  Staff to assist with fluids and other items  Nursing staff report that overall mood has been happy very rarely if she having issues with anxiety--the as needed low-dose lorazepam has been effective  Weight has been stable and overall she has been thriving well at her current assisted living home and her quality of life is optimal  Assisted living very appropriate placement at this stage  No concerns raised by nursing staff--- Virginia is unable to provide any history due to dementia    Past Medical, Surgical, Family and Social History reviewed: YES.     Medications: reviewed  Medications - recent changes:     Review of Systems:  General: positive for fatigue, weakness  Skin: positive for hammertoes with pressure callouses-  : positive for incontinence  Musculoskeletal: positive for arthritis and muscular weakness  Neurologic: positive for memory problems  All other systems negative  Toileting:    Continent of Bowel: No   Continent of Bladder: No  Mobility: easy  stand and wheelchair    Recent Labs: None      Current Therapies: none    Exam:  Vital signs reviewed.   GENERAL APPEARANCE: alert and no distress  EYES: Eyes grossly normal to inspection  RESP: lungs without any adventitious sounds however unable to take deep breaths Limited exam  CV:  no peripheral edema   ABDOMEN: soft, nontender  MS: Wheelchair dependent, no musculoskeletal alignment abnormalities--hammertoes bilateral metatarsals #2 and 3--MIP area clean and dry healed calluses painted with Betadine--- wears loosefitting knitted slippers nonweightbearing  SKIN: Dry  NEURO: Alert,mentation intact and speech normal, baseline confusion overall appears very happy and comfortable  PSYCH:affect normal/bright    Assessment and Plan:    Very pleasant gracious lady with advanced memory loss however appears happy, comfortable--- good appetite weight is stable  Quality of life is optimal    Hammertoe pressure ulcers have been healed for over a year--maintaining with povidone iodine painted to affected areas to keep clean and dry--nonambulatory feet covered with loose stockings or soft slippers    No changes in plan indicated at this time    Follow-up as requested on rounds    20 minutes spent in direct visit and exam, chart review, medication review and renewed standing orders, wound care plan reviewed and continue for maintenance        (G30.9,  F02.C4) Severe Alzheimer's dementia with anxiety, unspecified timing of dementia onset (H)  (primary encounter diagnosis)      (Z59.3) Living in assisted living      (L89.890) Pressure ulcer of toe of right foot, unstageable (H)      (M20.41,  M20.42) Hammertoes of both feet      (Z99.3) Wheelchair dependence

## 2024-10-31 DIAGNOSIS — I10 ESSENTIAL HYPERTENSION: ICD-10-CM

## 2024-11-01 RX ORDER — METOPROLOL TARTRATE 25 MG/1
25 TABLET, FILM COATED ORAL 2 TIMES DAILY
Qty: 180 TABLET | Refills: 1 | Status: SHIPPED | OUTPATIENT
Start: 2024-11-01

## 2024-11-01 NOTE — TELEPHONE ENCOUNTER
[facility] sent Rx request for the following:      Requested Prescriptions   Pending Prescriptions Disp Refills    metoprolol tartrate (LOPRESSOR) 25 MG tablet [Pharmacy Med Name: Metoprolol Tartrate 25 MG Oral Tablet (Lopressor)] 180 tablet 3     Sig: Take 1 Tablet by mouth two times a day.       Beta-Blockers Protocol Failed - 11/1/2024 11:23 AM        Failed - Most recent blood pressure under 140/90 in past 12 months     BP Readings from Last 3 Encounters:   07/29/24 (!) 144/68   03/07/24 (!) 144/65   02/01/24 (!) 140/72       No data recorded            Failed - Recent (12 mo) or future (90 days) visit within the authorizing provider's specialty     The patient must have completed an in-person or virtual visit within the past 12 months or has a future visit scheduled within the next 90 days with the authorizing provider s specialty.  Urgent care and e-visits do not quality as an office visit for this protocol.          Passed - Patient is age 6 or older        Passed - Medication is active on med list        Passed - Medication indicated for associated diagnosis     Medication is associated with one or more of the following diagnoses:     Hypertension (HTN)   Atrial fibrillation/flutter   Angina   ASCVD   Migraine   Heart Failure   Tremor   Anxiety   Ocular hypertension   Glaucoma   PTSD   Obstructive hypertrophic cardiomyopathy   History of myocarditis   Palpitations   POTS (postural orthostatic tachycardia syndrome)   SVT (supraventricular tachycardia)   Hyperthyroidism   Tachycardia   Acute non-st segment elevation myocardial infarction   Subsequent non-st segment elevation myocardial infarction   Ischemic myocardial dysfunction               Last Prescription Date:   11/8/22  Last Fill Qty/Refills:         180, R-3    Last Office Visit:              9/24/24   Future Office visit:           None    Unable to complete prescription refill per RN Medication Refill Policy.     Americo Muñoz RN on  11/1/2024 at 11:24 AM

## 2024-11-13 ENCOUNTER — MEDICAL CORRESPONDENCE (OUTPATIENT)
Dept: HEALTH INFORMATION MANAGEMENT | Facility: OTHER | Age: 89
End: 2024-11-13
Payer: MEDICARE

## 2024-11-21 RX ORDER — VITAMIN B COMPLEX
1 TABLET ORAL EVERY EVENING
Qty: 90 TABLET | Refills: 3 | Status: CANCELLED | OUTPATIENT
Start: 2024-11-21

## 2024-11-21 RX ORDER — CHOLECALCIFEROL (VITAMIN D3) 25 MCG
1 CAPSULE ORAL EVERY EVENING
Qty: 90 CAPSULE | Refills: 3 | Status: CANCELLED | OUTPATIENT
Start: 2024-11-21

## 2024-11-21 RX ORDER — PSEUDOEPHED/ACETAMINOPH/DIPHEN 30MG-500MG
TABLET ORAL
Qty: 360 TABLET | Refills: 3 | Status: CANCELLED | OUTPATIENT
Start: 2024-11-21

## 2024-11-21 RX ORDER — PHENOL 1.4 %
1 AEROSOL, SPRAY (ML) MUCOUS MEMBRANE EVERY EVENING
Qty: 90 TABLET | Refills: 3 | Status: CANCELLED | OUTPATIENT
Start: 2024-11-21

## 2024-11-21 RX ORDER — LOSARTAN POTASSIUM 50 MG/1
50 TABLET ORAL DAILY
Qty: 90 TABLET | Refills: 3 | Status: CANCELLED | OUTPATIENT
Start: 2024-11-21

## 2024-11-21 RX ORDER — METOPROLOL TARTRATE 25 MG/1
25 TABLET, FILM COATED ORAL 2 TIMES DAILY
Qty: 180 TABLET | Refills: 3 | Status: CANCELLED | OUTPATIENT
Start: 2024-11-21

## 2024-11-21 RX ORDER — FERROUS GLUCONATE 324(38)MG
TABLET ORAL
Qty: 36 TABLET | Refills: 2 | Status: CANCELLED | OUTPATIENT
Start: 2024-11-21

## 2024-11-21 RX ORDER — ZINC GLUCONATE 50 MG
50 TABLET ORAL EVERY EVENING
Qty: 90 TABLET | Refills: 3 | Status: CANCELLED | OUTPATIENT
Start: 2024-11-21

## 2024-11-21 RX ORDER — VENLAFAXINE HYDROCHLORIDE 75 MG/1
CAPSULE, EXTENDED RELEASE ORAL
Qty: 90 CAPSULE | Refills: 3 | Status: CANCELLED | OUTPATIENT
Start: 2024-11-21

## 2024-11-21 RX ORDER — POTASSIUM CHLORIDE 750 MG/1
10 TABLET, EXTENDED RELEASE ORAL DAILY
Qty: 90 TABLET | Refills: 3 | Status: CANCELLED | OUTPATIENT
Start: 2024-11-21

## 2024-11-21 RX ORDER — ASPIRIN 81 MG/1
81 TABLET, CHEWABLE ORAL DAILY
Qty: 90 TABLET | Refills: 3 | Status: CANCELLED | OUTPATIENT
Start: 2024-11-21

## 2024-11-21 NOTE — PATIENT INSTRUCTIONS
Patient Education   Preventive Care Advice   This is general advice given by our system to help you stay healthy. However, your care team may have specific advice just for you. Please talk to your care team about your preventive care needs.  Nutrition  Eat 5 or more servings of fruits and vegetables each day.  Try wheat bread, brown rice and whole grain pasta (instead of white bread, rice, and pasta).  Get enough calcium and vitamin D. Check the label on foods and aim for 100% of the RDA (recommended daily allowance).  Lifestyle  Exercise at least 150 minutes each week  (30 minutes a day, 5 days a week).  Do muscle strengthening activities 2 days a week. These help control your weight and prevent disease.  No smoking.  Wear sunscreen to prevent skin cancer.  Have a dental exam and cleaning every 6 months.  Yearly exams  See your health care team every year to talk about:  Any changes in your health.  Any medicines your care team has prescribed.  Preventive care, family planning, and ways to prevent chronic diseases.  Shots (vaccines)   HPV shots (up to age 26), if you've never had them before.  Hepatitis B shots (up to age 59), if you've never had them before.  COVID-19 shot: Get this shot when it's due.  Flu shot: Get a flu shot every year.  Tetanus shot: Get a tetanus shot every 10 years.  Pneumococcal, hepatitis A, and RSV shots: Ask your care team if you need these based on your risk.  Shingles shot (for age 50 and up)  General health tests  Diabetes screening:  Starting at age 35, Get screened for diabetes at least every 3 years.  If you are younger than age 35, ask your care team if you should be screened for diabetes.  Cholesterol test: At age 39, start having a cholesterol test every 5 years, or more often if advised.  Bone density scan (DEXA): At age 50, ask your care team if you should have this scan for osteoporosis (brittle bones).  Hepatitis C: Get tested at least once in your life.  STIs (sexually  transmitted infections)  Before age 24: Ask your care team if you should be screened for STIs.  After age 24: Get screened for STIs if you're at risk. You are at risk for STIs (including HIV) if:  You are sexually active with more than one person.  You don't use condoms every time.  You or a partner was diagnosed with a sexually transmitted infection.  If you are at risk for HIV, ask about PrEP medicine to prevent HIV.  Get tested for HIV at least once in your life, whether you are at risk for HIV or not.  Cancer screening tests  Cervical cancer screening: If you have a cervix, begin getting regular cervical cancer screening tests starting at age 21.  Breast cancer scan (mammogram): If you've ever had breasts, begin having regular mammograms starting at age 40. This is a scan to check for breast cancer.  Colon cancer screening: It is important to start screening for colon cancer at age 45.  Have a colonoscopy test every 10 years (or more often if you're at risk) Or, ask your provider about stool tests like a FIT test every year or Cologuard test every 3 years.  To learn more about your testing options, visit:   .  For help making a decision, visit:   https://bit.ly/ug41058.  Prostate cancer screening test: If you have a prostate, ask your care team if a prostate cancer screening test (PSA) at age 55 is right for you.  Lung cancer screening: If you are a current or former smoker ages 50 to 80, ask your care team if ongoing lung cancer screenings are right for you.  For informational purposes only. Not to replace the advice of your health care provider. Copyright   2023 [employer]. All rights reserved. Clinically reviewed by the [facility]. [employer] [id number] - REV 01/24.

## 2024-11-22 ENCOUNTER — OFFICE VISIT (OUTPATIENT)
Dept: GERIATRICS | Facility: OTHER | Age: 89
End: 2024-11-22
Attending: NURSE PRACTITIONER
Payer: MEDICARE

## 2024-11-22 VITALS
RESPIRATION RATE: 16 BRPM | SYSTOLIC BLOOD PRESSURE: 108 MMHG | OXYGEN SATURATION: 94 % | DIASTOLIC BLOOD PRESSURE: 64 MMHG | TEMPERATURE: 97.1 F | HEART RATE: 66 BPM

## 2024-11-22 DIAGNOSIS — D64.9 ANEMIA, UNSPECIFIED TYPE: ICD-10-CM

## 2024-11-22 DIAGNOSIS — F41.8 DEPRESSION WITH ANXIETY: ICD-10-CM

## 2024-11-22 DIAGNOSIS — F02.80 ALZHEIMER'S DISEASE (H): ICD-10-CM

## 2024-11-22 DIAGNOSIS — G30.9 ALZHEIMER'S DISEASE (H): ICD-10-CM

## 2024-11-22 DIAGNOSIS — D50.9 IRON DEFICIENCY ANEMIA, UNSPECIFIED IRON DEFICIENCY ANEMIA TYPE: ICD-10-CM

## 2024-11-22 DIAGNOSIS — I10 ESSENTIAL HYPERTENSION: ICD-10-CM

## 2024-11-22 DIAGNOSIS — E87.6 HYPOKALEMIA: ICD-10-CM

## 2024-11-22 DIAGNOSIS — Z00.00 ENCOUNTER FOR MEDICARE ANNUAL WELLNESS EXAM: Primary | ICD-10-CM

## 2024-11-22 DIAGNOSIS — Z79.899 ENCOUNTER FOR MEDICATION REVIEW: ICD-10-CM

## 2024-11-22 DIAGNOSIS — Z13.220 SCREENING FOR HYPERLIPIDEMIA: ICD-10-CM

## 2024-11-22 DIAGNOSIS — K21.9 GASTROESOPHAGEAL REFLUX DISEASE WITHOUT ESOPHAGITIS: ICD-10-CM

## 2024-11-22 RX ORDER — SODIUM PHOSPHATE,MONO-DIBASIC 19G-7G/118
1 ENEMA (ML) RECTAL DAILY PRN
COMMUNITY

## 2024-11-22 RX ORDER — BISACODYL 10 MG
10 SUPPOSITORY, RECTAL RECTAL DAILY PRN
COMMUNITY

## 2024-11-22 RX ORDER — ALUMINA, MAGNESIA, AND SIMETHICONE 2400; 2400; 240 MG/30ML; MG/30ML; MG/30ML
30 SUSPENSION ORAL EVERY 4 HOURS PRN
COMMUNITY

## 2024-11-22 RX ORDER — GUAIFENESIN 200 MG/10ML
10 LIQUID ORAL EVERY 4 HOURS PRN
COMMUNITY

## 2024-11-22 RX ORDER — LOPERAMIDE HYDROCHLORIDE 2 MG/1
2 CAPSULE ORAL PRN
COMMUNITY

## 2024-11-22 RX ORDER — BACITRACIN ZINC AND POLYMYXIN B SULFATE 500; 1000 [USP'U]/G; [USP'U]/G
OINTMENT TOPICAL 3 TIMES DAILY PRN
COMMUNITY

## 2024-11-22 SDOH — HEALTH STABILITY: PHYSICAL HEALTH: ON AVERAGE, HOW MANY DAYS PER WEEK DO YOU ENGAGE IN MODERATE TO STRENUOUS EXERCISE (LIKE A BRISK WALK)?: 0 DAYS

## 2024-11-22 SDOH — HEALTH STABILITY: PHYSICAL HEALTH: ON AVERAGE, HOW MANY MINUTES DO YOU ENGAGE IN EXERCISE AT THIS LEVEL?: 0 MIN

## 2024-11-22 ASSESSMENT — SOCIAL DETERMINANTS OF HEALTH (SDOH): HOW OFTEN DO YOU GET TOGETHER WITH FRIENDS OR RELATIVES?: PATIENT DECLINED

## 2024-11-22 NOTE — PROGRESS NOTES
Preventive Care Visit  [facility] AND [facility]  HERMILA Baca CNP, Family Medicine  Nov 22, 2024      Assessment & Plan     Encounter for Medicare annual wellness exam  Wellness Visit Notes:    -Mammo done 11/12/2014 (impression: negative). No further testing  -DEXA done 9/21/2006 (impression: osteopenia). No further  -Colon cancer screening done via colonoscopy on 3/18/2002 (impression: diverticulosis/H-Pylori). No further testing    -Immunizations: Patient is due for the following: Covid, Tdap, and Shingrix   -Derm: Does patient regularly see dermatologist? no    -Refills pended for requested medications.  -Labs pended. Lipid, Microalbumin,CBC, BMP      Follow-up with resident with advanced stage dementia, immobility dependent on nursing staff for ADLs--resides in assisted living 24/7 caregiving staff  Staff report there have not been any recent falls, weight has been stable,  normal concerns raised has been stable  Staff report bowel and bladder habits have been at baseline, appetite has been good and quality of life is optimal at this stage  Resident unable to engage in conversation due to advanced dementia however appears comfortable and needs are being met.  Assisted living 24/7 care very appropriate at this stage.  Blood pressure running in the low 110s--will reduce losartan from 50 to 25 mg daily with hold orders for systolic less than 110  Will review next rounds and possibly discontinue and continue to reduce medications    Encounter for medication review      Alzheimer's disease (H)      Iron deficiency anemia, unspecified iron deficiency anemia type      Essential hypertension      Gastroesophageal reflux disease without esophagitis      Hypokalemia      Depression with anxiety      Screening for hyperlipidemia      Patient has been advised of split billing requirements and indicates understanding: Yes        Counseling  Appropriate preventive services were addressed with this patient via  screening, questionnaire, or discussion as appropriate for fall prevention, nutrition, physical activity, Tobacco-use cessation, social engagement, weight loss and cognition.  Checklist reviewing preventive services available has been given to the patient.  Reviewed patient's diet, addressing concerns and/or questions.   Updated plan of care.  Patient reported difficulty with activities of daily living were addressed today.The patient was provided with written information regarding signs of hearing loss.   Information on urinary incontinence and treatment options given to patient.         No follow-ups on file.    Kimberly Coleman is a 89 year old, presenting for the following:  Medicare Visit    Health Care Directive  Patient has a Health Care Directive on file  Advance care planning document is on file and is current.      11/22/2024   General Health   How would you rate your overall physical health? (!) DECLINE   Feel stress (tense, anxious, or unable to sleep) Patient declined            11/22/2024   Nutrition   Diet: Regular (no restrictions)            11/22/2024   Exercise   Days per week of moderate/strenous exercise 0 days   Average minutes spent exercising at this level 0 min      (!) EXERCISE CONCERN      11/22/2024   Social Factors   Frequency of gathering with friends or relatives Patient declined   Worry food won't last until get money to buy more Patient declined   Food not last or not have enough money for food? Patient declined   Do you have housing? (Housing is defined as stable permanent housing and does not include staying ouside in a car, in a tent, in an abandoned building, in an overnight shelter, or couch-surfing.) Patient declined   Are you worried about losing your housing? Patient declined   Lack of transportation? Patient declined   Unable to get utilities (heat,electricity)? Patient declined            11/22/2024   Fall Risk   Fallen 2 or more times in the past year? No    Trouble  with walking or balance? Yes    Reason Gait Speed Test Not Completed Patient does not comprehend instructions       Patient-reported          11/22/2024   Activities of Daily Living- Home Safety   Needs help with the following daily activites Telephone use    Transportation    Shopping    Preparing meals    Housework    Bathing    Laundry    Medication administration    Money management    Toileting    Feeding    Dressing   Safety concerns in the home None of the above       Multiple values from one day are sorted in reverse-chronological order         11/22/2024   Dental   Dentist two times every year? (!) DECLINE            11/22/2024   Hearing Screening   Hearing concerns? (!) IT'S HARD TO FOLLOW A CONVERSATION IN A NOISY RESTAURANT OR CROWDED ROOM.    (!) TROUBLE UNDERSTANDING SOFT OR WHISPERED SPEECH.       Multiple values from one day are sorted in reverse-chronological order         11/22/2024   Driving Risk Screening   Patient/family members have concerns about driving (!) DECLINE            11/22/2024   General Alertness/Fatigue Screening   Have you been more tired than usual lately? (!) DECLINE            11/22/2024   Urinary Incontinence Screening   Bothered by leaking urine in past 6 months Yes            11/22/2024   TB Screening   Were you born outside of the [place]? Decline              Today's PHQ-2 Score:       11/8/2022     3:07 PM   PHQ-2 ( 1999 [employer])   Q1: Little interest or pleasure in doing things 0   Q2: Feeling down, depressed or hopeless 0   PHQ-2 Score 0         11/22/2024   Substance Use   Alcohol more than 3/day or more than 7/wk Not Applicable   Do you have a current opioid prescription? No   How severe/bad is pain from 1 to 10? 0/10 (No Pain)   Do you use any other substances recreationally? No        Social History     Tobacco Use    Smoking status: Never    Smokeless tobacco: Never   Vaping Use    Vaping status: Never Used   Substance Use Topics    Alcohol use: No    Drug use: No         "                 Current providers sharing in care for this patient include:  Patient Care Team:  Pamella Hu MD as PCP - General (Family Medicine)  Akiko Michel APRN CNP as Nurse Practitioner (Family Medicine)  Akiko Michel APRN CNP as Assigned PCP    The following health maintenance items are reviewed in Epic and correct as of today:  Health Maintenance   Topic Date Due    MICROALBUMIN  Never done    SPIROMETRY  Never done    COPD ACTION PLAN  Never done    DTAP/TDAP/TD IMMUNIZATION (1 - Tdap) Never done    ZOSTER IMMUNIZATION (1 of 2) Never done    RSV VACCINE (1 - 1-dose 75+ series) Never done    LIPID  08/22/2015    HEMOGLOBIN  04/13/2024    BMP  04/20/2024    COVID-19 Vaccine (1 - 2024-25 season) Never done    MEDICARE ANNUAL WELLNESS VISIT  11/22/2025    FALL RISK ASSESSMENT  11/22/2025    ADVANCE CARE PLANNING  08/01/2029    PHQ-2 (once per calendar year)  Completed    INFLUENZA VACCINE  Completed    Pneumococcal Vaccine: 65+ Years  Completed    URINALYSIS  Completed    HPV IMMUNIZATION  Aged Out    MENINGITIS IMMUNIZATION  Aged Out    RSV MONOCLONAL ANTIBODY  Aged Out          Objective    Exam  /64 (BP Location: Left arm, Patient Position: Supine, Cuff Size: Adult Regular)   Pulse 66   Temp 97.1  F (36.2  C)   Resp 16   SpO2 94%   Breastfeeding No    Estimated body mass index is 18.6 kg/m  as calculated from the following:    Height as of 2/14/23: 1.6 m (5' 3\").    Weight as of 9/24/24: 47.6 kg (105 lb).          11/22/2024   Mini Cog   Mini-Cog Not Completed (choose reason) Known dementia                 Signed Electronically by: HERMILA Baca CNP    "

## 2024-11-26 RX ORDER — LOSARTAN POTASSIUM 25 MG/1
25 TABLET ORAL DAILY
Qty: 30 TABLET | Refills: 2 | Status: SHIPPED | OUTPATIENT
Start: 2024-11-26

## 2024-11-27 ENCOUNTER — DOCUMENTATION ONLY (OUTPATIENT)
Dept: OTHER | Facility: CLINIC | Age: 89
End: 2024-11-27
Payer: MEDICARE

## 2024-12-03 DIAGNOSIS — I10 ESSENTIAL HYPERTENSION: Primary | ICD-10-CM

## 2024-12-03 RX ORDER — LOSARTAN POTASSIUM 50 MG/1
50 TABLET ORAL DAILY
Qty: 90 TABLET | Refills: 3 | Status: SHIPPED | OUTPATIENT
Start: 2024-12-03

## 2024-12-06 ENCOUNTER — LAB (OUTPATIENT)
Dept: LAB | Facility: OTHER | Age: 89
End: 2024-12-06
Attending: FAMILY MEDICINE
Payer: MEDICARE

## 2024-12-06 DIAGNOSIS — I10 ESSENTIAL HYPERTENSION, MALIGNANT: Primary | ICD-10-CM

## 2024-12-06 DIAGNOSIS — D64.9 ANEMIA, UNSPECIFIED: ICD-10-CM

## 2024-12-06 LAB
ANION GAP SERPL CALCULATED.3IONS-SCNC: 10 MMOL/L (ref 7–15)
BASOPHILS # BLD AUTO: 0 10E3/UL (ref 0–0.2)
BASOPHILS NFR BLD AUTO: 1 %
BUN SERPL-MCNC: 28.6 MG/DL (ref 8–23)
CALCIUM SERPL-MCNC: 9.3 MG/DL (ref 8.8–10.4)
CHLORIDE SERPL-SCNC: 106 MMOL/L (ref 98–107)
CREAT SERPL-MCNC: 0.98 MG/DL (ref 0.51–0.95)
EGFRCR SERPLBLD CKD-EPI 2021: 53 ML/MIN/1.73M2
EOSINOPHIL # BLD AUTO: 0 10E3/UL (ref 0–0.7)
EOSINOPHIL NFR BLD AUTO: 0 %
ERYTHROCYTE [DISTWIDTH] IN BLOOD BY AUTOMATED COUNT: 14.6 % (ref 10–15)
GLUCOSE SERPL-MCNC: 101 MG/DL (ref 70–99)
HCO3 SERPL-SCNC: 24 MMOL/L (ref 22–29)
HCT VFR BLD AUTO: 34.1 % (ref 35–47)
HGB BLD-MCNC: 10.9 G/DL (ref 11.7–15.7)
IMM GRANULOCYTES # BLD: 0 10E3/UL
IMM GRANULOCYTES NFR BLD: 1 %
LYMPHOCYTES # BLD AUTO: 1.1 10E3/UL (ref 0.8–5.3)
LYMPHOCYTES NFR BLD AUTO: 19 %
MCH RBC QN AUTO: 33.5 PG (ref 26.5–33)
MCHC RBC AUTO-ENTMCNC: 32 G/DL (ref 31.5–36.5)
MCV RBC AUTO: 105 FL (ref 78–100)
MONOCYTES # BLD AUTO: 0.6 10E3/UL (ref 0–1.3)
MONOCYTES NFR BLD AUTO: 11 %
NEUTROPHILS # BLD AUTO: 3.8 10E3/UL (ref 1.6–8.3)
NEUTROPHILS NFR BLD AUTO: 68 %
NRBC # BLD AUTO: 0 10E3/UL
NRBC BLD AUTO-RTO: 0 /100
PLATELET # BLD AUTO: 334 10E3/UL (ref 150–450)
POTASSIUM SERPL-SCNC: 3.9 MMOL/L (ref 3.4–5.3)
RBC # BLD AUTO: 3.25 10E6/UL (ref 3.8–5.2)
SODIUM SERPL-SCNC: 140 MMOL/L (ref 135–145)
WBC # BLD AUTO: 5.6 10E3/UL (ref 4–11)

## 2024-12-06 PROCEDURE — 36415 COLL VENOUS BLD VENIPUNCTURE: CPT | Mod: ZL

## 2024-12-06 PROCEDURE — 85014 HEMATOCRIT: CPT | Mod: ZL

## 2024-12-06 PROCEDURE — 85004 AUTOMATED DIFF WBC COUNT: CPT | Mod: ZL

## 2024-12-06 PROCEDURE — 82310 ASSAY OF CALCIUM: CPT | Mod: ZL

## 2024-12-06 PROCEDURE — 80048 BASIC METABOLIC PNL TOTAL CA: CPT | Mod: ZL

## 2025-01-22 DIAGNOSIS — D50.9 IRON DEFICIENCY ANEMIA, UNSPECIFIED IRON DEFICIENCY ANEMIA TYPE: ICD-10-CM

## 2025-01-23 RX ORDER — FERROUS GLUCONATE 324(38)MG
TABLET ORAL
Qty: 36 TABLET | Refills: 1 | Status: SHIPPED | OUTPATIENT
Start: 2025-01-23

## 2025-01-23 NOTE — TELEPHONE ENCOUNTER
[facility] [place], [place] sent Rx request for the following:      Requested Prescriptions   Pending Prescriptions Disp Refills    ferrous gluconate (FERGON) 324 (38 Fe) MG tablet [Pharmacy Med Name: Ferrous Gluconate 324 (38 Fe) MG Oral Tablet] 36 tablet 2     Sig: Take 1 Tablet by mouth every Monday, Wednesday and Friday.   Last Prescription Date:   5/20/24  Last Fill Qty/Refills:         36, R-2    Last Office Visit:              9/24/24 ([facility] Visit, Akiko Michel)   Future Office visit:           None    Prescription refilled per RN Medication Refill Policy.................... Martina Hatfield RN ....................  1/23/2025   4:30 PM

## 2025-02-05 ENCOUNTER — MEDICAL CORRESPONDENCE (OUTPATIENT)
Dept: HEALTH INFORMATION MANAGEMENT | Facility: OTHER | Age: OVER 89
End: 2025-02-05
Payer: MEDICARE

## 2025-02-19 ENCOUNTER — MEDICAL CORRESPONDENCE (OUTPATIENT)
Dept: HEALTH INFORMATION MANAGEMENT | Facility: OTHER | Age: OVER 89
End: 2025-02-19
Payer: MEDICARE

## 2025-02-19 DIAGNOSIS — Z99.3 DEPENDENT FOR WHEELCHAIR MOBILITY: ICD-10-CM

## 2025-02-19 DIAGNOSIS — F02.811 ALZHEIMER'S DEMENTIA WITH AGITATION, UNSPECIFIED DEMENTIA SEVERITY, UNSPECIFIED TIMING OF DEMENTIA ONSET (H): ICD-10-CM

## 2025-02-19 DIAGNOSIS — F02.C4 SEVERE ALZHEIMER'S DEMENTIA WITH ANXIETY, UNSPECIFIED TIMING OF DEMENTIA ONSET (H): ICD-10-CM

## 2025-02-19 DIAGNOSIS — R45.1 AGITATION: ICD-10-CM

## 2025-02-19 DIAGNOSIS — Z51.5 PALLIATIVE CARE PATIENT: ICD-10-CM

## 2025-02-19 DIAGNOSIS — G30.9 SEVERE ALZHEIMER'S DEMENTIA WITH ANXIETY, UNSPECIFIED TIMING OF DEMENTIA ONSET (H): ICD-10-CM

## 2025-02-19 DIAGNOSIS — G30.9 ALZHEIMER'S DEMENTIA WITH AGITATION, UNSPECIFIED DEMENTIA SEVERITY, UNSPECIFIED TIMING OF DEMENTIA ONSET (H): ICD-10-CM

## 2025-02-19 DIAGNOSIS — F43.22 ADJUSTMENT DISORDER WITH ANXIOUS MOOD: Primary | ICD-10-CM

## 2025-02-19 RX ORDER — LORAZEPAM 0.5 MG/1
TABLET ORAL
Qty: 40 TABLET | Refills: 5 | Status: SHIPPED | OUTPATIENT
Start: 2025-02-19

## 2025-04-30 ENCOUNTER — MEDICAL CORRESPONDENCE (OUTPATIENT)
Dept: HEALTH INFORMATION MANAGEMENT | Facility: OTHER | Age: OVER 89
End: 2025-04-30
Payer: MEDICARE

## 2025-05-02 ENCOUNTER — NURSING HOME VISIT (OUTPATIENT)
Dept: GERIATRICS | Facility: OTHER | Age: OVER 89
End: 2025-05-02
Attending: NURSE PRACTITIONER
Payer: MEDICARE

## 2025-05-02 VITALS
OXYGEN SATURATION: 96 % | WEIGHT: 110.6 LBS | BODY MASS INDEX: 19.59 KG/M2 | SYSTOLIC BLOOD PRESSURE: 154 MMHG | DIASTOLIC BLOOD PRESSURE: 64 MMHG | HEART RATE: 80 BPM

## 2025-05-02 DIAGNOSIS — L89.892 PRESSURE INJURY OF RIGHT FOOT, STAGE 2 (H): Primary | ICD-10-CM

## 2025-05-02 DIAGNOSIS — Z79.899 ENCOUNTER FOR MEDICATION REVIEW: ICD-10-CM

## 2025-05-02 DIAGNOSIS — J44.9 CHRONIC OBSTRUCTIVE PULMONARY DISEASE, UNSPECIFIED COPD TYPE (H): ICD-10-CM

## 2025-05-02 DIAGNOSIS — G30.9 SEVERE ALZHEIMER'S DEMENTIA WITH ANXIETY, UNSPECIFIED TIMING OF DEMENTIA ONSET (H): ICD-10-CM

## 2025-05-02 DIAGNOSIS — Z78.9 LIVING IN ASSISTED LIVING: ICD-10-CM

## 2025-05-02 DIAGNOSIS — F02.80 ALZHEIMER'S DISEASE (H): ICD-10-CM

## 2025-05-02 DIAGNOSIS — F02.C4 SEVERE ALZHEIMER'S DEMENTIA WITH ANXIETY, UNSPECIFIED TIMING OF DEMENTIA ONSET (H): ICD-10-CM

## 2025-05-02 DIAGNOSIS — G30.9 ALZHEIMER'S DISEASE (H): ICD-10-CM

## 2025-05-02 DIAGNOSIS — N18.31 STAGE 3A CHRONIC KIDNEY DISEASE (H): ICD-10-CM

## 2025-05-02 DIAGNOSIS — M20.41 HAMMERTOES OF BOTH FEET: ICD-10-CM

## 2025-05-02 DIAGNOSIS — Z99.3 WHEELCHAIR DEPENDENT: ICD-10-CM

## 2025-05-02 DIAGNOSIS — M20.42 HAMMERTOES OF BOTH FEET: ICD-10-CM

## 2025-05-02 NOTE — PROGRESS NOTES
Virginia Medrano is a 89 year old female being seen today for acute and follow up visit visit at [facility].    Code Status: Advance Directives: YES-  Health Care Power of : Extended Emergency Contact Information  Primary Emergency Contact: Krystian Medrano  Address: [address] [place] [address] [id number] [place] [address]  Home Phone: [phone]  Mobile Phone: [phone]  Relation: Son  Secondary Emergency Contact: Mickie Jack  Home Phone: [phone]  Mobile Phone: [phone]  Relation: Daughter     Allergies: Penicillins and Latex     Chief Complaint / HPI: Follow-up on right foot pressure injury wound that was purulent, directed staff to start soaking to open the surrounding blister  Filled with purulent fluid--collaborated with eldercare RN to start wound care-and treatment plan along with antibiotics cephalexin was started renal dosed.  Facility RN reports much improved--they continue to monitor, offload and reposition to avoid any pressure and report there has been no further purulent drainage.  History provided by facility RN--resident unable to provide history due to memory loss secondary to dementia  Facility RN does not report any other concerns--vital signs have remained stable, appetite good and at baseline  No changes in bowel or bladder habits-no medication administration issues      Past Medical, Surgical, Family and Social History reviewed: YES.     Medications: Reviewed  Medications - recent changes: Cephalexin    Review of Systems:  General: positive for weakness  Skin: positive for pressure wound  GI: positive for incontinence  : positive for incontinence  Musculoskeletal: positive for arthritis and muscular weakness  Neurologic: positive for memory problems  Psychiatric: positive for anxiety  All other systems negative  Toileting:    Continent of Bowel: No   Continent of Bladder: No  Mobility: wheelchair    Recent Labs: None      Current Therapies: none    Exam:     Vital  signs reviewed.   GENERAL APPEARANCE: alert and no distress  EYES: Eyes grossly normal to inspection  CV: regular rate,no peripheral edema   MS: no musculoskeletal defects are noted and non ambulatory  SKIN: dry, pressure ulcer lateral base great toe right foot improved--that purulence has drained, surrounding erythema is lighter and has retracted-- with palpation  Area cleansed with Anasept, alginate cut to fit to wound bed and cover with Allevyn border adhesive foam dressing-wound bed 2 cm with surrounding lambert wound tissue about 3 cm  NEURO: Normal strength and tone, sensory exam grossly normal, mentation intact and speech normal  PSYCH: mentation appears normal and affect normal/bright    Assessment and Plan:      (L24.012) Pressure injury of right foot, stage 2 (H)  (primary encounter diagnosis) good improvement with wound care and cephalexin and offloading--- she is nonambulatory  However staff reports she tends to move her legs when she is resting in bed--does not wear any tight fitting shoes  Staff will continue to offload with pillows and heel float booties--no shoes  Will add on 3 additional days of cephalexin for total of 10 days with wound care every 3 days and as needed for any saturation or dislodgment      (G30.9,  F02.80) Alzheimer's disease (H) advanced stage--appears comfortable and happy--needs are being met at facility--no hospitalizations over 2 years      (Z99.3) Wheelchair dependent--nonambulatory--1 staff transfers--staff do not report any falls      (M20.41,  M20.42) Hammertoes of both feet--chronic, at this time there are no pressure injury areas as there happened in the past--staff have been doing a good job with skin care and offloading      (Z78.9) Living in assisted living--very appropriate placement at this stage to meet needs for safety, 24/7 caregiving, ADLs and mobility--overall comfortable and quality of life optimal      (Z79.899) Encounter for medication  review--medications reviewed and reconciled-cephalexin dose adjusted for renal function      (J44.9) Chronic obstructive pulmonary disease, unspecified COPD type (H) chronic, stable    (N18.31) Stage 3a chronic kidney disease (H) chronic stable--due for labs next facility visit for medication monitoring      (G30.9,  F02.C4) Severe Alzheimer's dementia with anxiety, unspecified timing of dementia onset (H) anxiety has been under reasonable control at this stage--staff utilized PRNs very occasionally  No medication adjustments or interventions indicated at this time    Over 45 minutes spent in face-to-face visit, exam, medication review and management, wound care treatment management, ordering of labs hemoglobin, BMP

## 2025-05-05 PROBLEM — F02.C4: Status: ACTIVE | Noted: 2023-02-14

## 2025-05-16 ENCOUNTER — LAB REQUISITION (OUTPATIENT)
Dept: LAB | Facility: OTHER | Age: OVER 89
End: 2025-05-16
Payer: MEDICARE

## 2025-05-16 ENCOUNTER — NURSING HOME VISIT (OUTPATIENT)
Dept: GERIATRICS | Facility: OTHER | Age: OVER 89
End: 2025-05-16
Attending: NURSE PRACTITIONER
Payer: MEDICARE

## 2025-05-16 ENCOUNTER — RESULTS FOLLOW-UP (OUTPATIENT)
Dept: GERIATRICS | Facility: OTHER | Age: OVER 89
End: 2025-05-16

## 2025-05-16 VITALS
BODY MASS INDEX: 19.59 KG/M2 | SYSTOLIC BLOOD PRESSURE: 131 MMHG | WEIGHT: 110.6 LBS | DIASTOLIC BLOOD PRESSURE: 78 MMHG | TEMPERATURE: 98 F | HEART RATE: 74 BPM | OXYGEN SATURATION: 95 %

## 2025-05-16 DIAGNOSIS — D64.9 ANEMIA, UNSPECIFIED TYPE: ICD-10-CM

## 2025-05-16 DIAGNOSIS — L89.892 PRESSURE INJURY OF DORSUM OF LEFT FOOT, STAGE 2 (H): Primary | ICD-10-CM

## 2025-05-16 DIAGNOSIS — F02.80 ALZHEIMER'S DISEASE (H): ICD-10-CM

## 2025-05-16 DIAGNOSIS — Z78.9 LIVING IN ASSISTED LIVING: ICD-10-CM

## 2025-05-16 DIAGNOSIS — N18.31 STAGE 3A CHRONIC KIDNEY DISEASE (H): ICD-10-CM

## 2025-05-16 DIAGNOSIS — Z79.899 ENCOUNTER FOR MEDICATION REVIEW: ICD-10-CM

## 2025-05-16 DIAGNOSIS — G30.9 ALZHEIMER'S DISEASE (H): ICD-10-CM

## 2025-05-16 LAB
ALBUMIN SERPL BCG-MCNC: 3.9 G/DL (ref 3.5–5.2)
ALP SERPL-CCNC: 104 U/L (ref 40–150)
ALT SERPL W P-5'-P-CCNC: 11 U/L (ref 0–50)
ANION GAP SERPL CALCULATED.3IONS-SCNC: 11 MMOL/L (ref 7–15)
AST SERPL W P-5'-P-CCNC: 17 U/L (ref 0–45)
BASOPHILS # BLD AUTO: 0.1 10E3/UL (ref 0–0.2)
BASOPHILS NFR BLD AUTO: 1 %
BILIRUB SERPL-MCNC: 0.6 MG/DL
BUN SERPL-MCNC: 26.5 MG/DL (ref 8–23)
CALCIUM SERPL-MCNC: 9.1 MG/DL (ref 8.8–10.4)
CHLORIDE SERPL-SCNC: 105 MMOL/L (ref 98–107)
CREAT SERPL-MCNC: 1.03 MG/DL (ref 0.51–0.95)
EGFRCR SERPLBLD CKD-EPI 2021: 50 ML/MIN/1.73M2
EOSINOPHIL # BLD AUTO: 0 10E3/UL (ref 0–0.7)
EOSINOPHIL NFR BLD AUTO: 0 %
ERYTHROCYTE [DISTWIDTH] IN BLOOD BY AUTOMATED COUNT: 14.3 % (ref 10–15)
GLUCOSE SERPL-MCNC: 97 MG/DL (ref 70–99)
HCO3 SERPL-SCNC: 24 MMOL/L (ref 22–29)
HCT VFR BLD AUTO: 34.3 % (ref 35–47)
HGB BLD-MCNC: 11.1 G/DL (ref 11.7–15.7)
IMM GRANULOCYTES # BLD: 0.1 10E3/UL
IMM GRANULOCYTES NFR BLD: 1 %
LYMPHOCYTES # BLD AUTO: 1 10E3/UL (ref 0.8–5.3)
LYMPHOCYTES NFR BLD AUTO: 14 %
MCH RBC QN AUTO: 33 PG (ref 26.5–33)
MCHC RBC AUTO-ENTMCNC: 32.4 G/DL (ref 31.5–36.5)
MCV RBC AUTO: 102 FL (ref 78–100)
MONOCYTES # BLD AUTO: 0.8 10E3/UL (ref 0–1.3)
MONOCYTES NFR BLD AUTO: 12 %
NEUTROPHILS # BLD AUTO: 5.1 10E3/UL (ref 1.6–8.3)
NEUTROPHILS NFR BLD AUTO: 73 %
NRBC # BLD AUTO: 0 10E3/UL
NRBC BLD AUTO-RTO: 0 /100
PLATELET # BLD AUTO: 306 10E3/UL (ref 150–450)
POTASSIUM SERPL-SCNC: 4.7 MMOL/L (ref 3.4–5.3)
PROT SERPL-MCNC: 6.8 G/DL (ref 6.4–8.3)
RBC # BLD AUTO: 3.36 10E6/UL (ref 3.8–5.2)
SODIUM SERPL-SCNC: 140 MMOL/L (ref 135–145)
WBC # BLD AUTO: 7 10E3/UL (ref 4–11)

## 2025-05-16 PROCEDURE — 85025 COMPLETE CBC W/AUTO DIFF WBC: CPT | Performed by: NURSE PRACTITIONER

## 2025-05-16 PROCEDURE — 80053 COMPREHEN METABOLIC PANEL: CPT | Performed by: NURSE PRACTITIONER

## 2025-05-16 NOTE — PROGRESS NOTES
Virginia Medrano is a 89 year old female being seen today for follow up visit visit at [facility].    Code Status: DNR / DNI, Advance Directives: YES-  Health Care Power of : Extended Emergency Contact Information  Primary Emergency Contact: Krystian Medrano  Address: [address] [place] [address] [id number] [place] [address]  Home Phone: [phone]  Mobile Phone: [phone]  Relation: Son  Secondary Emergency Contact: Mickie Jack  Home Phone: [phone]  Mobile Phone: [phone]  Relation: Daughter     Allergies: Penicillins and Latex     Chief Complaint / HPI: Follow-up on left foot pressure injury ulcer, yearly review of medications due for monitoring labs.  Staff do not report any concerns she has been at her baseline--occasionally has anxiety does have a as needed which is effective  She is wheelchair dependent for mobility and requires staff assist for all essential ADLs--weight has been stable    Nursing staff have been monitoring and providing wound care and has been progressing towards healing weekly    Virginia is unable to provide any history due to memory loss secondary to dementia      Past Medical, Surgical, Family and Social History reviewed: YES.     Medications: Reviewed and reconciled  Medications - recent changes:     Review of Systems:  General: positive for weakness  Skin: positive for pressure ulcer  : positive for incontinence  Musculoskeletal: positive for arthritis, joint pain, and muscular weakness  Neurologic: positive for memory problems  Psychiatric: positive for anxiety and agitation  All other systems negative  Toileting:    Continent of Bowel: No   Continent of Bladder: No  Mobility: wheelchair    Recent Labs:   Recent Results (from the past 240 hours)   Comprehensive metabolic panel    Collection Time: 05/16/25 12:00 PM   Result Value Ref Range    Sodium 140 135 - 145 mmol/L    Potassium 4.7 3.4 - 5.3 mmol/L    Carbon Dioxide (CO2) 24 22 - 29 mmol/L    Anion Gap 11 7  - 15 mmol/L    Urea Nitrogen 26.5 (H) 8.0 - 23.0 mg/dL    Creatinine 1.03 (H) 0.51 - 0.95 mg/dL    GFR Estimate 50 (L) >60 mL/min/1.73m2    Calcium 9.1 8.8 - 10.4 mg/dL    Chloride 105 98 - 107 mmol/L    Glucose 97 70 - 99 mg/dL    Alkaline Phosphatase 104 40 - 150 U/L    AST 17 0 - 45 U/L    ALT 11 0 - 50 U/L    Protein Total 6.8 6.4 - 8.3 g/dL    Albumin 3.9 3.5 - 5.2 g/dL    Bilirubin Total 0.6 <=1.2 mg/dL   CBC with platelets and differential    Collection Time: 05/16/25 12:00 PM   Result Value Ref Range    WBC Count 7.0 4.0 - 11.0 10e3/uL    RBC Count 3.36 (L) 3.80 - 5.20 10e6/uL    Hemoglobin 11.1 (L) 11.7 - 15.7 g/dL    Hematocrit 34.3 (L) 35.0 - 47.0 %     (H) 78 - 100 fL    MCH 33.0 26.5 - 33.0 pg    MCHC 32.4 31.5 - 36.5 g/dL    RDW 14.3 10.0 - 15.0 %    Platelet Count 306 150 - 450 10e3/uL    % Neutrophils 73 %    % Lymphocytes 14 %    % Monocytes 12 %    % Eosinophils 0 %    % Basophils 1 %    % Immature Granulocytes 1 %    NRBCs per 100 WBC 0 <1 /100    Absolute Neutrophils 5.1 1.6 - 8.3 10e3/uL    Absolute Lymphocytes 1.0 0.8 - 5.3 10e3/uL    Absolute Monocytes 0.8 0.0 - 1.3 10e3/uL    Absolute Eosinophils 0.0 0.0 - 0.7 10e3/uL    Absolute Basophils 0.1 0.0 - 0.2 10e3/uL    Absolute Immature Granulocytes 0.1 <=0.4 10e3/uL    Absolute NRBCs 0.0 10e3/uL            Current Therapies: none    Exam:      Vital signs reviewed.   GENERAL APPEARANCE: alert and no distress  EYES: Eyes grossly normal to inspection  RESP: lungs clear   CV: regular rate and rhythm, no peripheral edema   ABDOMEN: soft, non tender  MS: no musculoskeletal defects are noted   SKIN: left later foot ulcer clean and dry--diameter retracting, wound bed flush with surrounding tissue--no erythema or focal edema  NEURO: mentation intact and speech normal--baseline confusion  PSYCH:affect normal/bright    Assessment and Plan:      (L89.892) Pressure injury of dorsum of left foot, stage 2 (H)  (primary encounter diagnosis) pressure  injury healing and expected stage making weekly progress-there is no more drainage will convert dressings to cleansing pat dry and border adhesive foam dressing change twice a week and as needed      (G30.9,  F02.80) Alzheimer's disease (H) chronic and stable--- I believe quality of life is optimal at this stage    (D64.9) Anemia, unspecified type--much improved we will continue daily iron supplement at this time      (Z78.9) Living in assisted living--- very appropriate placement at this time to support all essential ADLs and mobility needs to optimize independence and dignity      (N18.31) Stage 3a chronic kidney disease (H) chronic and stable      (Z79.899) Encounter for medication review labs and medications reviewed--- all stable and within acceptable range medications will be refilled as needed    Over 35 minutes spent in face-to-face visit, exam, medication review and management, ordering and review of all lab results, wound care plan adjustments

## 2025-05-20 ENCOUNTER — RESULTS FOLLOW-UP (OUTPATIENT)
Dept: GERIATRICS | Facility: OTHER | Age: OVER 89
End: 2025-05-20

## 2025-05-20 ENCOUNTER — LAB REQUISITION (OUTPATIENT)
Dept: LAB | Facility: OTHER | Age: OVER 89
End: 2025-05-20
Payer: COMMERCIAL

## 2025-05-20 LAB
ALBUMIN UR-MCNC: NEGATIVE MG/DL
APPEARANCE UR: CLEAR
BACTERIA #/AREA URNS HPF: ABNORMAL /HPF
BILIRUB UR QL STRIP: NEGATIVE
COLOR UR AUTO: ABNORMAL
GLUCOSE UR STRIP-MCNC: NEGATIVE MG/DL
HGB UR QL STRIP: NEGATIVE
KETONES UR STRIP-MCNC: NEGATIVE MG/DL
LEUKOCYTE ESTERASE UR QL STRIP: ABNORMAL
NITRATE UR QL: NEGATIVE
PH UR STRIP: 5 [PH] (ref 5–9)
RBC URINE: 1 /HPF
SP GR UR STRIP: 1.01 (ref 1–1.03)
UROBILINOGEN UR STRIP-MCNC: NORMAL MG/DL
WBC URINE: 15 /HPF

## 2025-05-21 DIAGNOSIS — N30.00 ACUTE CYSTITIS WITHOUT HEMATURIA: Primary | ICD-10-CM

## 2025-05-21 RX ORDER — DOXYCYCLINE HYCLATE 100 MG
100 TABLET ORAL 2 TIMES DAILY
Qty: 14 TABLET | Refills: 0 | Status: SHIPPED | OUTPATIENT
Start: 2025-05-21 | End: 2025-05-28

## 2025-05-22 LAB — BACTERIA UR CULT: ABNORMAL

## 2025-05-23 ENCOUNTER — NURSING HOME VISIT (OUTPATIENT)
Dept: GERIATRICS | Facility: OTHER | Age: OVER 89
End: 2025-05-23
Attending: NURSE PRACTITIONER
Payer: MEDICARE

## 2025-05-23 VITALS
DIASTOLIC BLOOD PRESSURE: 72 MMHG | SYSTOLIC BLOOD PRESSURE: 139 MMHG | WEIGHT: 110.6 LBS | TEMPERATURE: 98 F | OXYGEN SATURATION: 97 % | HEART RATE: 84 BPM | BODY MASS INDEX: 19.59 KG/M2 | RESPIRATION RATE: 16 BRPM

## 2025-05-23 DIAGNOSIS — Z99.3 DEPENDENT FOR WHEELCHAIR MOBILITY: ICD-10-CM

## 2025-05-23 DIAGNOSIS — Z79.899 ENCOUNTER FOR MEDICATION REVIEW: Primary | ICD-10-CM

## 2025-05-23 DIAGNOSIS — N30.00 ACUTE CYSTITIS WITHOUT HEMATURIA: ICD-10-CM

## 2025-05-23 DIAGNOSIS — F02.C4 SEVERE ALZHEIMER'S DEMENTIA WITH ANXIETY, UNSPECIFIED TIMING OF DEMENTIA ONSET (H): ICD-10-CM

## 2025-05-23 DIAGNOSIS — Z78.9 LIVING IN ASSISTED LIVING: ICD-10-CM

## 2025-05-23 DIAGNOSIS — G30.9 SEVERE ALZHEIMER'S DEMENTIA WITH ANXIETY, UNSPECIFIED TIMING OF DEMENTIA ONSET (H): ICD-10-CM

## 2025-05-23 DIAGNOSIS — L89.892 PRESSURE INJURY OF DORSUM OF LEFT FOOT, STAGE 2 (H): ICD-10-CM

## 2025-05-23 NOTE — PROGRESS NOTES
Virginia Medrano is a 89 year old female being seen today for acute and follow up visit at [facility].    Code Status: DNR / DNI, Advance Directives: YES-  Health Care Power of : Extended Emergency Contact Information  Primary Emergency Contact: Krystian Medrano  Address: [address] [place] [address] [id number] [place] [address]  Home Phone: [phone]  Mobile Phone: [phone]  Relation: Son  Secondary Emergency Contact: Mickie Jack  Home Phone: [phone]  Mobile Phone: [phone]  Relation: Daughter     Allergies: Penicillins and Latex     Chief Complaint / HPI: Follow-up on left foot lateral aspect dorsum inferior to first metatarsal pressure ulcer--- responding well to wound care, minimal drainage  Also follow-up on urinary tract infection--treated with doxycycline, staff report tolerated well, symptoms of dysuria, increased agitation and anxiety have resolved  Staff do not report any additional concerns  Virginia is unable to provide history due to memory loss secondary to advanced dementia  All historical information provided by chart review, labs, nursing staff, and face-to-face visit      -Past Medical, Surgical, Family and Social History reviewed: YES.     Medications: Reviewed  Medications - recent changes: Doxycycline    Review of Systems:  General: positive for weakness  Skin: positive for pressure ulcer  : positive for incontinence and urinary tract infection  Musculoskeletal: positive for arthritis and muscular weakness  Neurologic: positive for memory problems  Psychiatric: positive for anxiety and agitation  All other systems negative  Toileting:    Continent of Bowel: No   Continent of Bladder: No  Mobility: wheelchair    Recent Labs: Reviewed    Recent Results (from the past 240 hours)   UA Macroscopic with reflex to Microscopic and Culture    Collection Time: 05/20/25  7:03 PM    Specimen: Urine, NOS   Result Value Ref Range    Color Urine Light Yellow Colorless, Straw, Light  Yellow, Yellow    Appearance Urine Clear Clear    Glucose Urine Negative Negative mg/dL    Bilirubin Urine Negative Negative    Ketones Urine Negative Negative mg/dL    Specific Gravity Urine 1.013 1.000 - 1.030    Blood Urine Negative Negative    pH Urine 5.0 5.0 - 9.0    Protein Albumin Urine Negative Negative mg/dL    Urobilinogen Urine Normal Normal mg/dL    Nitrite Urine Negative Negative    Leukocyte Esterase Urine Moderate (A) Negative    Bacteria Urine Few (A) None Seen /HPF    RBC Urine 1 <=2 /HPF    WBC Urine 15 (H) <=5 /HPF   Urine Culture    Collection Time: 05/20/25  7:03 PM    Specimen: Urine, NOS   Result Value Ref Range    Culture >100,000 CFU/mL Escherichia coli (A)        Susceptibility    Escherichia coli - UMU     Ampicillin <=8 Susceptible      Ampicillin/ Sulbactam <=8 Susceptible      Aztreonam <=4 Susceptible      Cefazolin <=2 Susceptible      Cefepime <=2 Susceptible      Ceftazidime <=1 Susceptible      Ceftriaxone <=1 Susceptible      Ciprofloxacin <=0.25 Susceptible      Ertapenem <=0.5 Susceptible      Gentamicin <=2 Susceptible      Levofloxacin <=0.5 Susceptible      Nitrofurantoin <=32 Susceptible      Piperacillin/Tazobactam <=8 Susceptible      Tetracycline <=4 Susceptible      Tobramycin <=2 Susceptible      Trimethoprim/Sulfamethoxazole <=2/38 Susceptible      Cefuroxime <=4 Susceptible      Meropenem <=1 Susceptible         Current Therapies: none    Exam:            Vital signs reviewed.   GENERAL APPEARANCE: alert and no distress  EYES: Eyes grossly normal to inspection  RESP: lungs clear   CV: regular rate and rhythm, no peripheral edema   ABDOMEN: soft, nontender  MS: no musculoskeletal defects are noted and wheelchair dependent  SKIN: Pressure ulcer healing well, diameter has decreased by at least 50%, minimal to no drainage, no erythema or edema  NEURO: Alert,mentation intact and speech normal--baseline confusion  PSYCH: affect normal/bright    Assessment and  Plan:        (Z79.89) Encounter for medication review  (primary encounter diagnosis) tolerating doxycycline well, staff report caregiver reported symptoms have resolved and less agitated and anxious  Staff will continue to provide fluids, hygiene measures and monitoring  Medications reviewed, recent monitoring labs completed no adjustments indicated at this time      (L03.892) Pressure injury of dorsum of left foot, stage 2 (H) responding well to wound care--- continue wound wash, pat dry, cover with border adhesive foam dressing  Change twice a week and as needed if any drainage until wound bed has completely filled in      (G30.9,  F02.C4) Severe Alzheimer's dementia with anxiety, unspecified timing of dementia onset (H) dementia course has been stable--- staff report PRNs have been minimally needed and overall responds well to nonmedication interventions and redirectable      (Z99.3) Dependent for wheelchair mobility--- nonambulatory with compromised circulation affecting healing progression however progress has been steady----previous pressure ulcers have taken 2 to 3 months to heal    (N30.00) Acute cystitis without hematuria--tolerating antibiotic, staff feel that symptoms and behavior have resolved with antibiotics--- staff are trying to provide preventative hygiene, encouraging clear fluids      (Z78.9) Living in assisted living--- very appropriate resident at this stage IV 24/7 monitoring and support services available for all ADLs and mobility to optimize independence, dignity and quality of life  I feel that her quality of life is optimal at this stage at this facility      Over 25 minutes spent in direct face-to-face visit, medication review and management, review of lab results and antibiotics based on final culture organism, chart review, wound care review and oversight

## 2025-05-27 ENCOUNTER — DOCUMENTATION ONLY (OUTPATIENT)
Dept: OTHER | Facility: CLINIC | Age: OVER 89
End: 2025-05-27
Payer: MEDICARE

## 2025-06-27 ENCOUNTER — NURSING HOME VISIT (OUTPATIENT)
Dept: GERIATRICS | Facility: OTHER | Age: OVER 89
End: 2025-06-27
Attending: NURSE PRACTITIONER
Payer: MEDICARE

## 2025-06-27 VITALS
OXYGEN SATURATION: 100 % | WEIGHT: 110 LBS | TEMPERATURE: 98 F | DIASTOLIC BLOOD PRESSURE: 63 MMHG | BODY MASS INDEX: 19.49 KG/M2 | RESPIRATION RATE: 16 BRPM | HEART RATE: 70 BPM | SYSTOLIC BLOOD PRESSURE: 108 MMHG

## 2025-06-27 DIAGNOSIS — I10 ESSENTIAL HYPERTENSION: ICD-10-CM

## 2025-06-27 DIAGNOSIS — M20.41 ACQUIRED BILATERAL HAMMER TOES: ICD-10-CM

## 2025-06-27 DIAGNOSIS — L89.892 PRESSURE INJURY OF RIGHT FOOT, STAGE 2 (H): ICD-10-CM

## 2025-06-27 DIAGNOSIS — Z79.899 ENCOUNTER FOR MEDICATION REVIEW: Primary | ICD-10-CM

## 2025-06-27 DIAGNOSIS — R47.02 DYSPHASIA: ICD-10-CM

## 2025-06-27 DIAGNOSIS — M20.42 ACQUIRED BILATERAL HAMMER TOES: ICD-10-CM

## 2025-06-27 DIAGNOSIS — E87.6 HYPOKALEMIA: ICD-10-CM

## 2025-06-27 PROCEDURE — 99349 HOME/RES VST EST MOD MDM 40: CPT | Performed by: NURSE PRACTITIONER

## 2025-06-27 RX ORDER — POTASSIUM CHLORIDE 750 MG/1
10 CAPSULE, EXTENDED RELEASE ORAL DAILY
COMMUNITY
Start: 2025-06-27

## 2025-06-27 NOTE — PROGRESS NOTES
Virginia Medrano is a 89 year old female being seen today for acute and follow up  visit at [facility].    Code Status: DNR / DNI, Advance Directives: YES-  Health Care Power of : Extended Emergency Contact Information  Primary Emergency Contact: Krystian Medrano  Address: [address] [place] [address] [facility]  Home Phone: [phone]  Mobile Phone: [phone]  Relation: Son  Secondary Emergency Contact: Mickie Jack  Home Phone: [phone]  Mobile Phone: [phone]  Relation: Daughter     Allergies: Penicillins and Latex     Chief Complaint / HPI: Staff requesting follow-up on pressure related inflammation and intermittent issues with abrasions and ulcers particularly over hammertoes on both feet  Right foot lateral aspect great toe ulcer has been healing--staff report  Left foot toes are close together, mild erythema, no visible infections or indication of any focal inflammation--staff reports hammertoes on the left foot metatarsals #2345--tend to be painful with minimal weightbearing or position changes    Staff also report her potassium pill is large and difficult for her to swallow had a recent choking episode--requesting changes  Nursing staff requesting review of medications and reductions  No other concerns raised        Past Medical, Surgical, Family and Social History reviewed: YES.     Medications: Reviewed  Medications - recent changes: none    Review of Systems:  General: positive for weakness  Skin: positive for nail changes, stasis, pressure injury ulcer  : positive for incontinence  Musculoskeletal: positive for arthritis, joint pain, and muscular weakness  Neurologic: positive for memory problems  Psychiatric: positive for anxiety  All other systems negative  Toileting:    Continent of Bowel: No   Continent of Bladder: No  Mobility: wheelchair    Recent Labs: most recent reviewed      Current Therapies: none    Exam:            Vital signs reviewed.   GENERAL  APPEARANCE:  alert and no distress  EYES: Eyes grossly normal to inspection  RESP: lungs clear   CV: regular rate and rhythm, no peripheral edema-feet warm ROM  ABDOMEN: soft, nontender  MS: Wheelchair dependent, hammertoes metatarsals bilaterally  SKIN: Dry--- mild erythema between hammertoes left foot # 2,3,4,5--- pressure ulcer right lateral base of metatarsal #1 wound bed shallow, no erythema, no evidence of impetiginization or purulence--no drainage  Area is clean and dry  NEURO: Drowsy at baseline arouses easily to voice, mentation intact and speech normal, confusion at baseline  PSYCH: affect normal/bright    Assessment and Plan:        (Z79.899) Encounter for medication review--potassium tablet discontinued--- all medications can be crushed and administered with applesauce or some other soft foods  Potassium 10 mill equivalents changed to capsule that can be opened and sprinkled on food--- venlafaxine capsule can be opened and sprinkled in foods as well  Spoke with pharmacist from pharmacy that assisted living facility has a contract for delivery and he is agreeable with conversion of above meds for easier administration  Staff will crush all medications for ease of administration and safety  Monitor for swallowing difficulties    Next follow-up visit to facility will have discussion with facility RN was not available during my visit today to discuss goals of care--consider palliative hospice services to add extra level of support and comfort  To quality of life--will reach out to family member along with facility RN for discussions on clarification of goals of care        (E87.6) Hypokalemia--- has remained stable on supplement we will continue and formulation that she is able to swallow    Plan: potassium chloride ER (MICRO-K) 10 MEQ CR         capsule            (R47.02) Dysphasia--staff will monitor for any difficulties with taking medications or eating--- consider home care speech therapy consultation  if family agreeable and facility RN feels this would best inform care    Plan: potassium chloride ER (MICRO-K) 10 MEQ CR         capsule            (M20.41,  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.42) Acquired bilateral hammer toes--generally painful--does wear protective soft slipper however my understanding is she tends to move her feet frequently bed--develops superficial abrasions  Would like the staff to use soft gauze woven loosely between all of her toes to prevent any pressure ulcers  Will reach out to foot and ankle orthopedics for further conservative intervention options for comfort--      (L89.892) Pressure injury of right foot, stage 2 (H) area almost completely healed diameter has reduced to about 5 mm, depth almost flush with surrounding tissue, no erythema, no drainage  Would continue to cover with Allevyn or other foam dressing for protection--change twice a week and as needed--offload is much as possible    Over 45 minutes spent in direct face-to-face visit, exam, medication review and management, consultation with pharmacist, chart review

## 2025-07-01 RX ORDER — POTASSIUM CHLORIDE 750 MG/1
10 TABLET, EXTENDED RELEASE ORAL DAILY
Qty: 90 TABLET | Refills: 3 | OUTPATIENT
Start: 2025-07-01

## 2025-07-01 NOTE — TELEPHONE ENCOUNTER
The original prescription was discontinued on 6/27/2025 by Akiko Michel APRN CNP for the following reason: Alternate therapy.        Would you like to send new prescription for the following?    potassium chloride ER (MICRO-K) 10 MEQ CR capsule -- -- 6/27/2025 -- No   Sig - Route: Take 1 capsule (10 mEq) by mouth daily. Open and sprinkle in applesauce - Oral   Class: Historical   Notes to Pharmacy: Needs pills crushed--DONATO Hatfield, Refill RN .............. 7/1/2025  3:32 PM

## 2025-07-10 DIAGNOSIS — F41.8 DEPRESSION WITH ANXIETY: ICD-10-CM

## 2025-07-10 DIAGNOSIS — G30.9 ALZHEIMER'S DISEASE (H): ICD-10-CM

## 2025-07-10 DIAGNOSIS — K21.9 GASTROESOPHAGEAL REFLUX DISEASE WITHOUT ESOPHAGITIS: ICD-10-CM

## 2025-07-10 DIAGNOSIS — F02.80 ALZHEIMER'S DISEASE (H): ICD-10-CM

## 2025-07-10 DIAGNOSIS — D50.9 IRON DEFICIENCY ANEMIA, UNSPECIFIED IRON DEFICIENCY ANEMIA TYPE: ICD-10-CM

## 2025-07-11 ENCOUNTER — NURSING HOME VISIT (OUTPATIENT)
Dept: GERIATRICS | Facility: OTHER | Age: OVER 89
End: 2025-07-11
Payer: MEDICARE

## 2025-07-11 VITALS
OXYGEN SATURATION: 100 % | WEIGHT: 110 LBS | RESPIRATION RATE: 18 BRPM | DIASTOLIC BLOOD PRESSURE: 82 MMHG | SYSTOLIC BLOOD PRESSURE: 122 MMHG | BODY MASS INDEX: 19.49 KG/M2 | TEMPERATURE: 98 F | HEART RATE: 88 BPM

## 2025-07-11 DIAGNOSIS — L89.890 PRESSURE INJURY OF RIGHT FOOT, UNSTAGEABLE (H): ICD-10-CM

## 2025-07-11 DIAGNOSIS — D63.8 ANEMIA IN OTHER CHRONIC DISEASES CLASSIFIED ELSEWHERE: Primary | ICD-10-CM

## 2025-07-11 DIAGNOSIS — Z99.3 WHEELCHAIR DEPENDENT: ICD-10-CM

## 2025-07-11 DIAGNOSIS — Z79.899 ENCOUNTER FOR MEDICATION REVIEW: ICD-10-CM

## 2025-07-11 PROCEDURE — 99347 HOME/RES VST EST SF MDM 20: CPT | Performed by: NURSE PRACTITIONER

## 2025-07-11 RX ORDER — FERROUS GLUCONATE 324(38)MG
TABLET ORAL
Qty: 12 TABLET | Refills: 5 | Status: SHIPPED | OUTPATIENT
Start: 2025-07-11

## 2025-07-11 NOTE — PROGRESS NOTES
Virginia Medrano is a 89 year old female being seen today for acute and follow up visit at [facility].    Code Status: DNR / DNI, Advance Directives: YES-  Health Care Power of : Extended Emergency Contact Information  Primary Emergency Contact: Krystian Medrano  Address: [address] [place] [address] [facility]  Home Phone: [phone]  Mobile Phone: [phone]  Relation: Son  Secondary Emergency Contact: Mickie Jack  Home Phone: [phone]  Mobile Phone: [phone]  Relation: Daughter     Allergies: Penicillins and Latex     Chief Complaint / HPI: Follow-up on right lateral foot base of metatarsal #1 pressure injury ulcer status--almost healed  Staff have been leaving open to air protecting with soft pressure relieving slippers  Also had multiple areas on her left metatarsals dorsal aspect those have all healed, no interdigital erythema or pain  Overall has been stable--wheelchair dependent--dependent on staff for all transfers positioning and repositioning  Nursing staff feels she has been at her baseline--weight has been stable no new concerns  Rarely needs PRN for panic attacks    Past Medical, Surgical, Family and Social History reviewed: YES.     Medications: Reviewed and reconciled  Medications - recent changes:     Review of Systems:  General: positive for weakness  Skin: positive for pressure ulcers  : positive for incontinence  Neurologic: positive for memory problems  All other systems negative  Toileting:    Continent of Bowel: No   Continent of Bladder: No  Mobility: wheelchair    Recent Labs: None    Current Therapies: none    Exam:          Vital signs reviewed.   GENERAL APPEARANCE: alert and no distress  EYES: Eyes grossly normal to inspection  RESP: lungs clear   CV: regular rate and rhythm, no peripheral edema   ABDOMEN: soft, nontender  MS: no musculoskeletal defects are noted--nonambulatory  SKIN: dry--4 mm pressure injury ulcer lateral surface base of great  toe right foot healing at expected stage---  Left dorsum metatarsals almost completely healed no interdigital dermatitis  NEURO: Drowsy at baseline arouses easily to voice mentation intact and speech normal--pleasantly confused  PSYCH: affect normal/bright    Assessment and Plan:    (D63.8) Anemia in other chronic diseases classified elsewhere  (primary encounter diagnosis) ferrous gluconate refilled will be due for monitoring labs next month    Plan: ferrous gluconate (FERGON) 324 (38 Fe) MG         tablet            (Z79.899) Encounter for medication review--medications reviewed and reconciled no adjustments indicated at this time      (Z99.3) Wheelchair dependent--- dependent on staff for all transfers positioning and repositioning    (L89.890) Pressure injury of right foot, unstageable (H) pressure injury ulcer healing at expected stage can be open to air staff will continue to apply soft protective shoe wear and pillows to prevent pressure      Over 25 minutes spent in direct face-to-face visit, exam, chart review, medication review--ordering of follow-up monitoring labs

## 2025-07-14 DIAGNOSIS — F02.80 ALZHEIMER'S DISEASE (H): ICD-10-CM

## 2025-07-14 DIAGNOSIS — K21.9 GASTROESOPHAGEAL REFLUX DISEASE WITHOUT ESOPHAGITIS: ICD-10-CM

## 2025-07-14 DIAGNOSIS — G30.9 ALZHEIMER'S DISEASE (H): ICD-10-CM

## 2025-07-14 DIAGNOSIS — F41.8 DEPRESSION WITH ANXIETY: ICD-10-CM

## 2025-07-14 RX ORDER — ASPIRIN 81 MG
81 TABLET,CHEWABLE ORAL DAILY
Qty: 90 TABLET | Refills: 3 | Status: SHIPPED | OUTPATIENT
Start: 2025-07-14 | End: 2025-07-15

## 2025-07-14 RX ORDER — ASPIRIN 81 MG
81 TABLET,CHEWABLE ORAL DAILY
Qty: 90 TABLET | Refills: 3 | OUTPATIENT
Start: 2025-07-14

## 2025-07-14 RX ORDER — VENLAFAXINE HYDROCHLORIDE 75 MG/1
75 CAPSULE, EXTENDED RELEASE ORAL DAILY
Qty: 90 CAPSULE | Refills: 3 | Status: SHIPPED | OUTPATIENT
Start: 2025-07-14 | End: 2025-07-15

## 2025-07-14 RX ORDER — OMEPRAZOLE 20 MG/1
20 CAPSULE, DELAYED RELEASE ORAL DAILY
Qty: 90 CAPSULE | Refills: 3 | Status: SHIPPED | OUTPATIENT
Start: 2025-07-14 | End: 2025-07-15

## 2025-07-14 RX ORDER — OMEPRAZOLE 20 MG/1
20 CAPSULE, DELAYED RELEASE ORAL DAILY
Qty: 90 CAPSULE | Refills: 3 | OUTPATIENT
Start: 2025-07-14

## 2025-07-14 RX ORDER — VENLAFAXINE HYDROCHLORIDE 75 MG/1
75 CAPSULE, EXTENDED RELEASE ORAL DAILY
Qty: 90 CAPSULE | Refills: 3 | OUTPATIENT
Start: 2025-07-14

## 2025-07-15 RX ORDER — OMEPRAZOLE 20 MG/1
20 CAPSULE, DELAYED RELEASE ORAL DAILY
Qty: 90 CAPSULE | Refills: 3 | Status: SHIPPED | OUTPATIENT
Start: 2025-07-15

## 2025-07-15 RX ORDER — ASPIRIN 81 MG/1
81 TABLET, CHEWABLE ORAL DAILY
Qty: 90 TABLET | Refills: 3 | Status: SHIPPED | OUTPATIENT
Start: 2025-07-15

## 2025-07-15 RX ORDER — VENLAFAXINE HYDROCHLORIDE 75 MG/1
75 CAPSULE, EXTENDED RELEASE ORAL DAILY
Qty: 90 CAPSULE | Refills: 3 | Status: SHIPPED | OUTPATIENT
Start: 2025-07-15

## 2025-07-15 NOTE — TELEPHONE ENCOUNTER
Prescriptions failed to transmit.    Orders resent. Martin Parsons RN .............. 7/15/2025  8:28 AM

## 2025-07-16 RX ORDER — CHOLECALCIFEROL (VITAMIN D3) 25 MCG
1 TABLET ORAL EVERY EVENING
Qty: 90 TABLET | Refills: 3 | Status: SHIPPED | OUTPATIENT
Start: 2025-07-16

## 2025-07-16 RX ORDER — FERROUS GLUCONATE 324(38)MG
TABLET ORAL
Qty: 36 TABLET | Refills: 1 | OUTPATIENT
Start: 2025-07-16

## 2025-07-16 NOTE — TELEPHONE ENCOUNTER
[facility], [place] - [address] [facility] AT [facility] [phone]  sent Rx request for the following:      Requested Prescriptions   Pending Prescriptions Disp Refills    ferrous gluconate (FERGON) 324 (38 Fe) MG tablet [Pharmacy Med Name: Ferrous Gluconate 324 (38 Fe) MG Oral Tablet] 36 tablet 1     Sig: Take 1 Tablet by mouth every Monday, Wednesday and Friday.       Iron Supplements Failed - 7/16/2025  3:10 PM        Failed - Medication is active on med list and the sig matches. RN to manually verify dose and sig if red X/fail.      Duplicate 12tab 5 refills  Unable to complete prescription refill per RN Medication Refill Policy.   Altaf Ashby RN on 7/16/2025 at 3:13 PM      VITAMIN D3 25 MCG (1000 UT) tablet [Pharmacy Med Name: Vitamin D3 25 MCG (1000 UT) Oral Tablet] 90 tablet 3     Sig: Take 1 Tablet by mouth every evening.       Vitamin Supplements Protocol Failed - 7/16/2025  3:10 PM        Failed - Normal Hgb on file in past 12 mos     Recent Labs   Lab Test 05/16/25  1200   HGB 11.1*           Failed - Medication indicated for associated diagnosis     The medication is prescribed for one or more of the following conditions:     Vitamin deficiency   Osteopenia   Osteoporosis   Nutrition counseling   Nutrition education   Feeding ability finding   Bone density finding   Morbid Obesity   History of bypass of stomach   Idiopathic peripheral neuropathy   General examination of patient   Vitamin D deficiency   Folate deficiency anemia due to dietary causes   Sleeve resection of stomach   Rheumatoid factor level - finding   Crohn's disease   Psoriatic arthritis   Transplant of Kidney   Arthropathy   Alcoholism   Malabsorption syndrome   Disorder of bone and articular cartilage   Cystic Fibrosis  Bronchiectasis      Last Prescription Date:   8/12/2024  Last Fill Qty/Refills:         90, R-3    Last Office Visit:              5/23/2025   Future Office visit:           none      Unable to  complete prescription refill per RN Medication Refill Policy.   Will route to pcp to review and approve   Altaf Ashby RN on 7/16/2025 at 3:16 PM

## 2025-07-17 ENCOUNTER — LAB (OUTPATIENT)
Dept: LAB | Facility: OTHER | Age: OVER 89
End: 2025-07-17
Payer: MEDICARE

## 2025-07-17 DIAGNOSIS — R39.9 UTI SYMPTOMS: Primary | ICD-10-CM

## 2025-07-17 DIAGNOSIS — R39.9 UTI SYMPTOMS: ICD-10-CM

## 2025-07-17 LAB
ALBUMIN UR-MCNC: 30 MG/DL
APPEARANCE UR: ABNORMAL
BACTERIA #/AREA URNS HPF: ABNORMAL /HPF
BILIRUB UR QL STRIP: NEGATIVE
COLOR UR AUTO: YELLOW
GLUCOSE UR STRIP-MCNC: NEGATIVE MG/DL
HGB UR QL STRIP: ABNORMAL
HYALINE CASTS: 4 /LPF
KETONES UR STRIP-MCNC: NEGATIVE MG/DL
LEUKOCYTE ESTERASE UR QL STRIP: ABNORMAL
MUCOUS THREADS #/AREA URNS LPF: PRESENT /LPF
NITRATE UR QL: NEGATIVE
PH UR STRIP: 5.5 [PH] (ref 5–9)
RBC URINE: 13 /HPF
SP GR UR STRIP: 1.02 (ref 1–1.03)
SQUAMOUS EPITHELIAL: 5 /HPF
UROBILINOGEN UR STRIP-MCNC: NORMAL MG/DL
WBC CLUMPS #/AREA URNS HPF: PRESENT /HPF
WBC URINE: >182 /HPF

## 2025-07-17 PROCEDURE — 81001 URINALYSIS AUTO W/SCOPE: CPT | Mod: ZL

## 2025-07-17 PROCEDURE — 87186 SC STD MICRODIL/AGAR DIL: CPT | Mod: ZL

## 2025-07-18 ENCOUNTER — NURSING HOME VISIT (OUTPATIENT)
Dept: GERIATRICS | Facility: OTHER | Age: OVER 89
End: 2025-07-18
Attending: NURSE PRACTITIONER
Payer: MEDICARE

## 2025-07-18 DIAGNOSIS — L89.892 PRESSURE INJURY OF DORSUM OF LEFT FOOT, STAGE 2 (H): ICD-10-CM

## 2025-07-18 DIAGNOSIS — Z71.89 GOALS OF CARE, COUNSELING/DISCUSSION: ICD-10-CM

## 2025-07-18 DIAGNOSIS — F02.C4: ICD-10-CM

## 2025-07-18 DIAGNOSIS — G30.8: ICD-10-CM

## 2025-07-18 DIAGNOSIS — N30.00 ACUTE CYSTITIS WITHOUT HEMATURIA: ICD-10-CM

## 2025-07-18 DIAGNOSIS — Z74.1 REQUIRES DAILY ASSISTANCE FOR ACTIVITIES OF DAILY LIVING (ADL) AND COMFORT NEEDS: ICD-10-CM

## 2025-07-18 DIAGNOSIS — Z78.9 LIVING IN ASSISTED LIVING: ICD-10-CM

## 2025-07-18 DIAGNOSIS — Z51.5 COMFORT MEASURES ONLY STATUS: Primary | ICD-10-CM

## 2025-07-18 DIAGNOSIS — F41.1 GENERALIZED ANXIETY DISORDER: ICD-10-CM

## 2025-07-18 DIAGNOSIS — Z79.899 ENCOUNTER FOR MEDICATION REVIEW: ICD-10-CM

## 2025-07-18 RX ORDER — GABAPENTIN 100 MG/1
100 CAPSULE ORAL AT BEDTIME
Qty: 30 CAPSULE | Refills: 3 | Status: SHIPPED | OUTPATIENT
Start: 2025-07-18

## 2025-07-18 NOTE — PROGRESS NOTES
Virginia Medrano is a 89 year old female being seen today for comprehensive, acute, and follow up visit visit at [facility].    Code Status: DNR / DNI, Advance Directives: YES-  Health Care Power of : Extended Emergency Contact Information  Primary Emergency Contact: Krystian Medrano  Address: [address] [place] [address] [facility]  Home Phone: [phone]  Mobile Phone: [phone]  Relation: Son  Secondary Emergency Contact: Mickie Jack  Home Phone: [phone]  Mobile Phone: [phone]  Relation: Daughter     Allergies: Penicillins and Latex     Chief Complaint / HPI: Follow-up on medication, nursing staff reporting that they have been needing to utilize the PRN low-dose Ativan up to twice a day   And are looking for additional medication or adjustments as she is having more anxiety panic attacks--becomes difficult to console  The Ativan has been very helpful at reducing the anxiety that escalates to agitation and difficult to manage and redirect behaviors--she has tolerated this well  Staff reports there is not a specific pattern except for evenings but this has been happening more frequent and at different times of the day.  I wanted to have a phone conference with her son Krystian who is her legal healthcare directive agent to discuss goals of care, increased frequency of anxiety panic attacks  Which I believe some of the episodes are delirium consistent with advanced stage dementia.    We discussed multiple possible triggers--pain may be one of them as the staff reports she will grimace encourage sometimes when the staff is transferring her or repositioning her.    We did discuss comfort care hospice services as a possible option and his feelings about this which he is agreeable especially if we are not able to control her pain  And maintain comfort.    We discussed trying a low-dose gabapentin at bedtime with dual benefits for anxiety and pain and he is completely on board with  this--he is familiar with the medication as another family member utilizes this and has been beneficial.  She is nonambulatory, wheelchair dependent and dependent on staff for all transfers and positioning.    Krystian did not raise any concerns and has been very satisfied with the care that his mother has been receiving at [facility]    Past Medical, Surgical, Family and Social History reviewed: YES.     Medications: Reviewed  Medications - recent changes:     Review of Systems:  General: positive for fatigue, weakness  Skin: positive for pressure ulcers  : positive for incontinence and urinary tract infection  Musculoskeletal: positive for arthritis and muscular weakness  Neurologic: positive for memory problems and behavior changes  Psychiatric: positive for anxiety and agitation  All other systems negative  Toileting:    Continent of Bowel: No   Continent of Bladder: No  Mobility: wheelchair    Recent Labs:   Recent Results (from the past 240 hours)   UA Macroscopic with reflex to Microscopic and Culture    Collection Time: 07/17/25  3:50 PM    Specimen: Urine, NOS   Result Value Ref Range    Color Urine Yellow Colorless, Straw, Light Yellow, Yellow    Appearance Urine Cloudy (A) Clear    Glucose Urine Negative Negative mg/dL    Bilirubin Urine Negative Negative    Ketones Urine Negative Negative mg/dL    Specific Gravity Urine 1.020 1.000 - 1.030    Blood Urine Small (A) Negative    pH Urine 5.5 5.0 - 9.0    Protein Albumin Urine 30 (A) Negative mg/dL    Urobilinogen Urine Normal Normal mg/dL    Nitrite Urine Negative Negative    Leukocyte Esterase Urine Large (A) Negative    Bacteria Urine Many (A) None Seen /HPF    WBC Clumps Urine Present (A) None Seen /HPF    Mucus Urine Present (A) None Seen /LPF    RBC Urine 13 (H) <=2 /HPF    WBC Urine >182 (H) <=5 /HPF    Squamous Epithelials Urine 5 (H) <=1 /HPF    Hyaline Casts Urine 4 (H) <=2 /LPF   Urine Culture    Collection Time: 07/17/25  3:50 PM     Specimen: Urine, NOS   Result Value Ref Range    Culture >100,000 CFU/mL Escherichia coli (A)     Culture (A)      >100,000 CFU/mL Streptococcus agalactiae (Group B Streptococcus)       Susceptibility    Escherichia coli - UMU     Ampicillin <=8 Susceptible      Ampicillin/ Sulbactam <=8 Susceptible      Cefazolin <=2 Susceptible      Cefepime <=2 Susceptible      Ceftazidime <=1 Susceptible      Ceftriaxone <=1 Susceptible      Ciprofloxacin <=0.25 Susceptible      Ertapenem <=0.5 Susceptible      Gentamicin <=2 Susceptible      Levofloxacin <=0.5 Susceptible      Nitrofurantoin <=32 Susceptible      Piperacillin/Tazobactam <=8 Susceptible      Tetracycline <=4 Susceptible      Tobramycin <=2 Susceptible      Trimethoprim/Sulfamethoxazole <=2/38 Susceptible      Cefuroxime <=4 Susceptible      Meropenem <=1 Susceptible     Streptococcus agalactiae (Group B Streptococcus) - UMU     Ampicillin 0.12 Susceptible      Ceftriaxone (meningitis) <=0.25 Susceptible      Ceftriaxone (non-meningitis) <=0.25 Susceptible      Levofloxacin 0.5 Susceptible      Tetracycline >4 Resistant      Vancomycin 0.5 Susceptible      Cefepime <=0.25 Susceptible      Penicillin 0.06 Susceptible             Current Therapies: none    Exam:  Vital signs reviewed.   GENERAL APPEARANCE:  No acute distress  EYES: Eyes grossly normal to inspection  ABDOMEN: soft, nontender  MS: no musculoskeletal defects are noted --wheelchair dependent  SKIN: Dry, healing stage pressure ulcer right foot base of great toe, abrasions and superficial ulcers almost completely healed left foot dorsum metatarsals 3,4,5  NEURO: Mentation drowsy at baseline , arouses to voice most of the time and speech dysarthric--- confused at baseline--Krystian reports she did not recognize him when he came to visit her  PSYCH: affect currently appears relaxed comfortable    Assessment and Plan:      (Z51.5) Comfort measures only status  (primary encounter diagnosis) good discussion  with Krystian her son and legal healthcare directive agent--goals of care are for comfort, quality of life  Facility would like to trial something for pain--- we will trial gabapentin at at bedtime low-dose--- staff will monitor for tolerance and side effects  Krystian is open to hospice services when staff are feeling that her condition is progressing towards final stages.  I believe she meets medical eligibility now.        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, progressing    Plan: gabapentin (NEURONTIN) 100 MG capsule            (Z71.89) Goals of care, counseling/discussion--good discussion with Krystian son and legal healthcare directive agent--he is very on board with goals of care for comfort and quality of life      (F41.1) Generalized anxiety disorder chronic, exacerabating    Plan: gabapentin (NEURONTIN) 100 MG capsule            (L89.892) Pressure injury of dorsum of left foot, stage 2 (H) healing at expected stage      (Z78.9) Living in assisted living--very appropriate at this stage to provide essential supports and additional supports as needed to meet needs and optimize quality of life      (N30.00) Acute cystitis without hematuria--recent UA culture positive--treated with Cipro  Clinically stable      (Z74.1) Requires daily assistance for activities of daily living (ADL) and comfort needs--dependent on staff to meet essential ADLs, mobility, positioning      Over 45 minutes spent in direct face-to-face visit, chart review, goals of care conference with healthcare directive agent, medication review and management, ordering of labs, results review and medication treatment

## 2025-07-19 LAB
BACTERIA UR CULT: ABNORMAL
BACTERIA UR CULT: ABNORMAL

## 2025-07-22 ENCOUNTER — MEDICAL CORRESPONDENCE (OUTPATIENT)
Dept: HEALTH INFORMATION MANAGEMENT | Facility: OTHER | Age: OVER 89
End: 2025-07-22
Payer: MEDICARE

## 2025-08-04 ENCOUNTER — MEDICAL CORRESPONDENCE (OUTPATIENT)
Dept: HEALTH INFORMATION MANAGEMENT | Facility: OTHER | Age: OVER 89
End: 2025-08-04
Payer: MEDICARE

## 2025-08-08 ENCOUNTER — NURSING HOME VISIT (OUTPATIENT)
Dept: GERIATRICS | Facility: OTHER | Age: OVER 89
End: 2025-08-08
Attending: NURSE PRACTITIONER
Payer: MEDICARE

## 2025-08-08 VITALS
WEIGHT: 112 LBS | BODY MASS INDEX: 19.84 KG/M2 | OXYGEN SATURATION: 96 % | HEART RATE: 64 BPM | DIASTOLIC BLOOD PRESSURE: 54 MMHG | SYSTOLIC BLOOD PRESSURE: 121 MMHG | TEMPERATURE: 97 F

## 2025-08-08 DIAGNOSIS — F41.0 PANIC DISORDER WITHOUT AGORAPHOBIA: ICD-10-CM

## 2025-08-08 DIAGNOSIS — N39.42 URINARY INCONTINENCE WITHOUT SENSORY AWARENESS: ICD-10-CM

## 2025-08-08 DIAGNOSIS — Z74.1 REQUIRES DAILY ASSISTANCE FOR ACTIVITIES OF DAILY LIVING (ADL) AND COMFORT NEEDS: ICD-10-CM

## 2025-08-08 DIAGNOSIS — Z51.5 PALLIATIVE CARE PATIENT: ICD-10-CM

## 2025-08-08 DIAGNOSIS — F02.C4: ICD-10-CM

## 2025-08-08 DIAGNOSIS — R13.19 OTHER DYSPHAGIA: ICD-10-CM

## 2025-08-08 DIAGNOSIS — M62.3 IMMOBILITY SYNDROME: Primary | ICD-10-CM

## 2025-08-08 DIAGNOSIS — Z78.9 LIVING IN ASSISTED LIVING: ICD-10-CM

## 2025-08-08 DIAGNOSIS — G30.8: ICD-10-CM

## 2025-08-08 DIAGNOSIS — L89.892 PRESSURE INJURY OF RIGHT FOOT, STAGE 2 (H): ICD-10-CM

## 2025-08-08 DIAGNOSIS — L89.151 PRESSURE INJURY OF SACRAL REGION, STAGE 1: ICD-10-CM

## 2025-08-08 DIAGNOSIS — R15.9 FULL INCONTINENCE OF FECES: ICD-10-CM

## 2025-08-08 DIAGNOSIS — I99.9 IMPAIRED CIRCULATION: ICD-10-CM

## 2025-08-08 DIAGNOSIS — Z79.899 ENCOUNTER FOR MEDICATION REVIEW: ICD-10-CM

## 2025-08-08 DIAGNOSIS — G62.9 NEUROPATHY: ICD-10-CM

## 2025-08-08 RX ORDER — GABAPENTIN 250 MG/5ML
SOLUTION ORAL
Qty: 470 ML | Refills: 2 | Status: SHIPPED | OUTPATIENT
Start: 2025-08-08 | End: 2025-08-08 | Stop reason: DRUGHIGH

## 2025-08-08 RX ORDER — GABAPENTIN 250 MG/5ML
50 SOLUTION ORAL 2 TIMES DAILY
Qty: 470 ML | Refills: 2 | Status: SHIPPED | OUTPATIENT
Start: 2025-08-08 | End: 2025-08-08

## 2025-08-08 RX ORDER — GABAPENTIN 250 MG/5ML
SOLUTION ORAL
Qty: 470 ML | Refills: 3 | Status: SHIPPED | OUTPATIENT
Start: 2025-08-08

## 2025-08-15 ENCOUNTER — NURSING HOME VISIT (OUTPATIENT)
Dept: GERIATRICS | Facility: OTHER | Age: OVER 89
End: 2025-08-15
Attending: NURSE PRACTITIONER
Payer: MEDICARE

## 2025-08-15 DIAGNOSIS — Z51.5 COMFORT MEASURES ONLY STATUS: ICD-10-CM

## 2025-08-15 DIAGNOSIS — F41.0 PANIC DISORDER WITHOUT AGORAPHOBIA: ICD-10-CM
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
## 2025-08-15 DIAGNOSIS — Z51.5 ENCOUNTER FOR PALLIATIVE CARE: Primary | ICD-10-CM

## 2025-08-15 DIAGNOSIS — Z79.899 ENCOUNTER FOR MEDICATION REVIEW: ICD-10-CM

## 2025-08-15 DIAGNOSIS — G62.9 NEUROPATHY: ICD-10-CM

## 2025-08-15 DIAGNOSIS — Z51.5 PALLIATIVE CARE PATIENT: ICD-10-CM

## 2025-08-15 DIAGNOSIS — R13.19 OTHER DYSPHAGIA: ICD-10-CM

## 2025-08-15 RX ORDER — GABAPENTIN 100 MG/1
100 CAPSULE ORAL AT BEDTIME
Qty: 30 CAPSULE | Refills: 3 | Status: SHIPPED | OUTPATIENT
Start: 2025-08-15

## 2025-08-15 RX ORDER — GABAPENTIN 250 MG/5ML
SOLUTION ORAL
Qty: 470 ML | Refills: 0 | Status: SHIPPED | OUTPATIENT
Start: 2025-08-15

## 2025-08-15 RX ORDER — GABAPENTIN 250 MG/5ML
SOLUTION ORAL
COMMUNITY
Start: 2025-08-15 | End: 2025-08-15

## 2025-09-02 ENCOUNTER — HOSPITAL ENCOUNTER (EMERGENCY)
Facility: OTHER | Age: OVER 89
Discharge: HOME | End: 2025-09-02
Attending: PHYSICIAN ASSISTANT
Payer: MEDICARE

## 2025-09-02 VITALS
DIASTOLIC BLOOD PRESSURE: 66 MMHG | BODY MASS INDEX: 19.84 KG/M2 | WEIGHT: 112 LBS | OXYGEN SATURATION: 98 % | RESPIRATION RATE: 20 BRPM | TEMPERATURE: 98.1 F | HEIGHT: 63 IN | SYSTOLIC BLOOD PRESSURE: 145 MMHG | HEART RATE: 62 BPM

## 2025-09-02 DIAGNOSIS — S90.822A BLISTER OF LEFT FOOT, INITIAL ENCOUNTER: ICD-10-CM

## 2025-09-02 DIAGNOSIS — S90.821A BLISTER OF RIGHT FOOT, INITIAL ENCOUNTER: ICD-10-CM

## 2025-09-02 DIAGNOSIS — L03.116 CELLULITIS OF LEFT FOOT: Primary | ICD-10-CM

## 2025-09-02 DIAGNOSIS — Z86.59 HISTORY OF DEMENTIA: ICD-10-CM

## 2025-09-02 LAB
ALBUMIN SERPL BCG-MCNC: 3.8 G/DL (ref 3.5–5.2)
ALP SERPL-CCNC: 109 U/L (ref 40–150)
ALT SERPL W P-5'-P-CCNC: 12 U/L (ref 0–50)
ANION GAP SERPL CALCULATED.3IONS-SCNC: 10 MMOL/L (ref 7–15)
AST SERPL W P-5'-P-CCNC: 22 U/L (ref 0–45)
BASOPHILS # BLD AUTO: 0.07 10E3/UL (ref 0–0.2)
BASOPHILS NFR BLD AUTO: 1 %
BILIRUB SERPL-MCNC: <0.2 MG/DL
BUN SERPL-MCNC: 34.4 MG/DL (ref 8–23)
CALCIUM SERPL-MCNC: 8.9 MG/DL (ref 8.8–10.4)
CHLORIDE SERPL-SCNC: 109 MMOL/L (ref 98–107)
CREAT SERPL-MCNC: 1.05 MG/DL (ref 0.51–0.95)
CRP SERPL-MCNC: 5.13 MG/L
EGFRCR SERPLBLD CKD-EPI 2021: 49 ML/MIN/1.73M2
EOSINOPHIL # BLD AUTO: <0.03 10E3/UL (ref 0–0.7)
EOSINOPHIL NFR BLD AUTO: 0 %
ERYTHROCYTE [DISTWIDTH] IN BLOOD BY AUTOMATED COUNT: 14.9 % (ref 10–15)
ERYTHROCYTE [SEDIMENTATION RATE] IN BLOOD BY WESTERGREN METHOD: 19 MM/HR (ref 0–30)
GLUCOSE SERPL-MCNC: 108 MG/DL (ref 70–99)
HCO3 SERPL-SCNC: 19 MMOL/L (ref 22–29)
HCT VFR BLD AUTO: 33.4 % (ref 35–47)
HGB BLD-MCNC: 10.6 G/DL (ref 11.7–15.7)
HOLD SPECIMEN: NORMAL
HOLD SPECIMEN: NORMAL
IMM GRANULOCYTES # BLD: 0.04 10E3/UL
IMM GRANULOCYTES NFR BLD: 0.6 %
LACTATE SERPL-SCNC: 1.3 MMOL/L (ref 0.7–2)
LYMPHOCYTES # BLD AUTO: 1.31 10E3/UL (ref 0.8–5.3)
LYMPHOCYTES NFR BLD AUTO: 19.2 %
MAGNESIUM SERPL-MCNC: 2 MG/DL (ref 1.7–2.3)
MCH RBC QN AUTO: 33 PG (ref 26.5–33)
MCHC RBC AUTO-ENTMCNC: 31.7 G/DL (ref 31.5–36.5)
MCV RBC AUTO: 104 FL (ref 78–100)
MONOCYTES # BLD AUTO: 0.96 10E3/UL (ref 0–1.3)
MONOCYTES NFR BLD AUTO: 14 %
NEUTROPHILS # BLD AUTO: 4.46 10E3/UL (ref 1.6–8.3)
NEUTROPHILS NFR BLD AUTO: 65.2 %
NRBC # BLD AUTO: <0.03 10E3/UL
NRBC BLD AUTO-RTO: 0 /100
NT-PROBNP SERPL-MCNC: 1203 PG/ML (ref 0–624)
PLATELET # BLD AUTO: 365 10E3/UL (ref 150–450)
POTASSIUM SERPL-SCNC: 5 MMOL/L (ref 3.4–5.3)
PROCALCITONIN SERPL IA-MCNC: 0.05 NG/ML
PROT SERPL-MCNC: 7.1 G/DL (ref 6.4–8.3)
RBC # BLD AUTO: 3.21 10E6/UL (ref 3.8–5.2)
SODIUM SERPL-SCNC: 138 MMOL/L (ref 135–145)
WBC # BLD AUTO: 6.84 10E3/UL (ref 4–11)

## 2025-09-02 PROCEDURE — 83880 ASSAY OF NATRIURETIC PEPTIDE: CPT | Performed by: PHYSICIAN ASSISTANT

## 2025-09-02 PROCEDURE — 36415 COLL VENOUS BLD VENIPUNCTURE: CPT | Performed by: PHYSICIAN ASSISTANT

## 2025-09-02 PROCEDURE — 83735 ASSAY OF MAGNESIUM: CPT | Performed by: PHYSICIAN ASSISTANT

## 2025-09-02 PROCEDURE — 80053 COMPREHEN METABOLIC PANEL: CPT | Performed by: PHYSICIAN ASSISTANT

## 2025-09-02 PROCEDURE — 86140 C-REACTIVE PROTEIN: CPT | Performed by: PHYSICIAN ASSISTANT

## 2025-09-02 PROCEDURE — 85004 AUTOMATED DIFF WBC COUNT: CPT | Performed by: PHYSICIAN ASSISTANT

## 2025-09-02 PROCEDURE — 250N000013 HC RX MED GY IP 250 OP 250 PS 637: Performed by: PHYSICIAN ASSISTANT

## 2025-09-02 PROCEDURE — 99283 EMERGENCY DEPT VISIT LOW MDM: CPT | Performed by: PHYSICIAN ASSISTANT

## 2025-09-02 PROCEDURE — 84145 PROCALCITONIN (PCT): CPT | Performed by: PHYSICIAN ASSISTANT

## 2025-09-02 PROCEDURE — 83605 ASSAY OF LACTIC ACID: CPT | Performed by: PHYSICIAN ASSISTANT

## 2025-09-02 PROCEDURE — 85652 RBC SED RATE AUTOMATED: CPT | Performed by: PHYSICIAN ASSISTANT

## 2025-09-02 RX ORDER — SULFAMETHOXAZOLE AND TRIMETHOPRIM 400; 80 MG/1; MG/1
1 TABLET ORAL 2 TIMES DAILY
Qty: 14 TABLET | Refills: 0 | Status: SHIPPED | OUTPATIENT
Start: 2025-09-02

## 2025-09-02 RX ORDER — SULFAMETHOXAZOLE AND TRIMETHOPRIM 800; 160 MG/1; MG/1
1 TABLET ORAL 2 TIMES DAILY
Qty: 14 TABLET | Refills: 0 | Status: SHIPPED | OUTPATIENT
Start: 2025-09-02 | End: 2025-09-02 | Stop reason: ALTCHOICE

## 2025-09-02 RX ORDER — SULFAMETHOXAZOLE AND TRIMETHOPRIM 800; 160 MG/1; MG/1
1 TABLET ORAL ONCE
Status: COMPLETED | OUTPATIENT
Start: 2025-09-02 | End: 2025-09-02

## 2025-09-02 RX ADMIN — SULFAMETHOXAZOLE AND TRIMETHOPRIM 1 TABLET: 800; 160 TABLET ORAL at 21:37

## 2025-09-02 ASSESSMENT — COLUMBIA-SUICIDE SEVERITY RATING SCALE - C-SSRS
1. IN THE PAST MONTH, HAVE YOU WISHED YOU WERE DEAD OR WISHED YOU COULD GO TO SLEEP AND NOT WAKE UP?: NO
6. HAVE YOU EVER DONE ANYTHING, STARTED TO DO ANYTHING, OR PREPARED TO DO ANYTHING TO END YOUR LIFE?: NO
2. HAVE YOU ACTUALLY HAD ANY THOUGHTS OF KILLING YOURSELF IN THE PAST MONTH?: NO

## 2025-09-02 ASSESSMENT — ACTIVITIES OF DAILY LIVING (ADL)
ADLS_ACUITY_SCORE: 61

## 2025-09-03 ENCOUNTER — PATIENT OUTREACH (OUTPATIENT)
Dept: CARE COORDINATION | Facility: CLINIC | Age: OVER 89
End: 2025-09-03
Payer: MEDICARE

## (undated) RX ORDER — CEFTRIAXONE SODIUM 1 G/50ML
INJECTION, SOLUTION INTRAVENOUS
Status: DISPENSED
Start: 2023-02-14

## (undated) RX ORDER — SULFAMETHOXAZOLE AND TRIMETHOPRIM 800; 160 MG/1; MG/1
TABLET ORAL
Status: DISPENSED
Start: 2025-09-02